# Patient Record
Sex: FEMALE | Race: WHITE | NOT HISPANIC OR LATINO | Employment: OTHER | URBAN - METROPOLITAN AREA
[De-identification: names, ages, dates, MRNs, and addresses within clinical notes are randomized per-mention and may not be internally consistent; named-entity substitution may affect disease eponyms.]

---

## 2023-11-24 ENCOUNTER — LAB REQUISITION (OUTPATIENT)
Dept: LAB | Facility: HOSPITAL | Age: 88
End: 2023-11-24

## 2023-11-24 DIAGNOSIS — I10 ESSENTIAL (PRIMARY) HYPERTENSION: ICD-10-CM

## 2023-11-24 LAB
ALBUMIN SERPL BCP-MCNC: 3.7 G/DL (ref 3.4–5)
ALP SERPL-CCNC: 103 U/L (ref 33–136)
ALT SERPL W P-5'-P-CCNC: 45 U/L (ref 7–45)
ANION GAP SERPL CALC-SCNC: 14 MMOL/L (ref 10–20)
AST SERPL W P-5'-P-CCNC: 44 U/L (ref 9–39)
BASOPHILS # BLD AUTO: 0.04 X10*3/UL (ref 0–0.1)
BASOPHILS NFR BLD AUTO: 0.6 %
BILIRUB SERPL-MCNC: 0.6 MG/DL (ref 0–1.2)
BUN SERPL-MCNC: 27 MG/DL (ref 6–23)
CALCIUM SERPL-MCNC: 8.5 MG/DL (ref 8.6–10.3)
CHLORIDE SERPL-SCNC: 108 MMOL/L (ref 98–107)
CHOLEST SERPL-MCNC: 137 MG/DL (ref 0–199)
CHOLESTEROL/HDL RATIO: 3
CO2 SERPL-SCNC: 25 MMOL/L (ref 21–32)
CREAT SERPL-MCNC: 1.16 MG/DL (ref 0.5–1.05)
EOSINOPHIL # BLD AUTO: 0.07 X10*3/UL (ref 0–0.4)
EOSINOPHIL NFR BLD AUTO: 1.1 %
ERYTHROCYTE [DISTWIDTH] IN BLOOD BY AUTOMATED COUNT: 12.4 % (ref 11.5–14.5)
GFR SERPL CREATININE-BSD FRML MDRD: 44 ML/MIN/1.73M*2
GLUCOSE SERPL-MCNC: 89 MG/DL (ref 74–99)
HCT VFR BLD AUTO: 42.3 % (ref 36–46)
HDLC SERPL-MCNC: 46.3 MG/DL
HGB BLD-MCNC: 13 G/DL (ref 12–16)
IMM GRANULOCYTES # BLD AUTO: 0.02 X10*3/UL (ref 0–0.5)
IMM GRANULOCYTES NFR BLD AUTO: 0.3 % (ref 0–0.9)
LDLC SERPL CALC-MCNC: 71 MG/DL
LYMPHOCYTES # BLD AUTO: 1.26 X10*3/UL (ref 0.8–3)
LYMPHOCYTES NFR BLD AUTO: 20.4 %
MCH RBC QN AUTO: 32.5 PG (ref 26–34)
MCHC RBC AUTO-ENTMCNC: 30.7 G/DL (ref 32–36)
MCV RBC AUTO: 106 FL (ref 80–100)
MONOCYTES # BLD AUTO: 0.64 X10*3/UL (ref 0.05–0.8)
MONOCYTES NFR BLD AUTO: 10.3 %
NEUTROPHILS # BLD AUTO: 4.16 X10*3/UL (ref 1.6–5.5)
NEUTROPHILS NFR BLD AUTO: 67.3 %
NON HDL CHOLESTEROL: 91 MG/DL (ref 0–149)
NRBC BLD-RTO: 0 /100 WBCS (ref 0–0)
PLATELET # BLD AUTO: 236 X10*3/UL (ref 150–450)
POTASSIUM SERPL-SCNC: 4.7 MMOL/L (ref 3.5–5.3)
PROT SERPL-MCNC: 5.8 G/DL (ref 6.4–8.2)
RBC # BLD AUTO: 4 X10*6/UL (ref 4–5.2)
SODIUM SERPL-SCNC: 142 MMOL/L (ref 136–145)
TRIGL SERPL-MCNC: 98 MG/DL (ref 0–149)
TSH SERPL-ACNC: 2.45 MIU/L (ref 0.44–3.98)
VLDL: 20 MG/DL (ref 0–40)
WBC # BLD AUTO: 6.2 X10*3/UL (ref 4.4–11.3)

## 2023-11-24 PROCEDURE — 85025 COMPLETE CBC W/AUTO DIFF WBC: CPT | Mod: OUT | Performed by: HOSPITALIST

## 2023-11-24 PROCEDURE — 80053 COMPREHEN METABOLIC PANEL: CPT | Mod: OUT | Performed by: HOSPITALIST

## 2023-11-24 PROCEDURE — 80061 LIPID PANEL: CPT | Mod: OUT | Performed by: HOSPITALIST

## 2023-11-24 PROCEDURE — 84443 ASSAY THYROID STIM HORMONE: CPT | Mod: OUT | Performed by: HOSPITALIST

## 2024-01-03 PROCEDURE — 87086 URINE CULTURE/COLONY COUNT: CPT | Mod: OUT,GEALAB | Performed by: HOSPITALIST

## 2024-01-03 PROCEDURE — 81001 URINALYSIS AUTO W/SCOPE: CPT | Mod: OUT | Performed by: HOSPITALIST

## 2024-01-04 ENCOUNTER — LAB REQUISITION (OUTPATIENT)
Dept: LAB | Facility: HOSPITAL | Age: 89
End: 2024-01-04
Payer: MEDICARE

## 2024-01-04 DIAGNOSIS — R52 PAIN, UNSPECIFIED: ICD-10-CM

## 2024-01-04 DIAGNOSIS — R31.9 HEMATURIA, UNSPECIFIED: ICD-10-CM

## 2024-01-04 DIAGNOSIS — N39.0 URINARY TRACT INFECTION, SITE NOT SPECIFIED: ICD-10-CM

## 2024-01-04 LAB
APPEARANCE UR: CLEAR
BILIRUB UR STRIP.AUTO-MCNC: NEGATIVE MG/DL
COLOR UR: YELLOW
GLUCOSE UR STRIP.AUTO-MCNC: NEGATIVE MG/DL
HYALINE CASTS #/AREA URNS AUTO: ABNORMAL /LPF
KETONES UR STRIP.AUTO-MCNC: NEGATIVE MG/DL
LEUKOCYTE ESTERASE UR QL STRIP.AUTO: ABNORMAL
NITRITE UR QL STRIP.AUTO: NEGATIVE
PH UR STRIP.AUTO: 5 [PH]
PROT UR STRIP.AUTO-MCNC: NEGATIVE MG/DL
RBC # UR STRIP.AUTO: NEGATIVE /UL
RBC #/AREA URNS AUTO: ABNORMAL /HPF
SP GR UR STRIP.AUTO: 1.01
UROBILINOGEN UR STRIP.AUTO-MCNC: <2 MG/DL
WBC #/AREA URNS AUTO: ABNORMAL /HPF

## 2024-01-05 LAB — BACTERIA UR CULT: NORMAL

## 2024-02-29 ENCOUNTER — LAB REQUISITION (OUTPATIENT)
Dept: LAB | Facility: HOSPITAL | Age: 89
End: 2024-02-29
Payer: MEDICARE

## 2024-02-29 DIAGNOSIS — R41.82 ALTERED MENTAL STATUS, UNSPECIFIED: ICD-10-CM

## 2024-02-29 LAB
ALBUMIN SERPL BCP-MCNC: 3.9 G/DL (ref 3.4–5)
ALP SERPL-CCNC: 108 U/L (ref 33–136)
ALT SERPL W P-5'-P-CCNC: 54 U/L (ref 7–45)
ANION GAP SERPL CALC-SCNC: 17 MMOL/L (ref 10–20)
AST SERPL W P-5'-P-CCNC: 51 U/L (ref 9–39)
BILIRUB SERPL-MCNC: 0.7 MG/DL (ref 0–1.2)
BUN SERPL-MCNC: 27 MG/DL (ref 6–23)
CALCIUM SERPL-MCNC: 9.1 MG/DL (ref 8.6–10.3)
CHLORIDE SERPL-SCNC: 109 MMOL/L (ref 98–107)
CHOLEST SERPL-MCNC: 149 MG/DL (ref 0–199)
CHOLESTEROL/HDL RATIO: 2.9
CO2 SERPL-SCNC: 21 MMOL/L (ref 21–32)
CREAT SERPL-MCNC: 1.29 MG/DL (ref 0.5–1.05)
EGFRCR SERPLBLD CKD-EPI 2021: 38 ML/MIN/1.73M*2
ERYTHROCYTE [DISTWIDTH] IN BLOOD BY AUTOMATED COUNT: 13.3 % (ref 11.5–14.5)
GLUCOSE SERPL-MCNC: 109 MG/DL (ref 74–99)
HCT VFR BLD AUTO: 42.7 % (ref 36–46)
HDLC SERPL-MCNC: 52 MG/DL
HGB BLD-MCNC: 13 G/DL (ref 12–16)
LDLC SERPL CALC-MCNC: 77 MG/DL
MCH RBC QN AUTO: 32.9 PG (ref 26–34)
MCHC RBC AUTO-ENTMCNC: 30.4 G/DL (ref 32–36)
MCV RBC AUTO: 108 FL (ref 80–100)
NON HDL CHOLESTEROL: 97 MG/DL (ref 0–149)
NRBC BLD-RTO: 0 /100 WBCS (ref 0–0)
PLATELET # BLD AUTO: 247 X10*3/UL (ref 150–450)
POTASSIUM SERPL-SCNC: 4.7 MMOL/L (ref 3.5–5.3)
PROT SERPL-MCNC: 6.7 G/DL (ref 6.4–8.2)
RBC # BLD AUTO: 3.95 X10*6/UL (ref 4–5.2)
SODIUM SERPL-SCNC: 142 MMOL/L (ref 136–145)
TRIGL SERPL-MCNC: 102 MG/DL (ref 0–149)
VLDL: 20 MG/DL (ref 0–40)
WBC # BLD AUTO: 6.3 X10*3/UL (ref 4.4–11.3)

## 2024-02-29 PROCEDURE — 80061 LIPID PANEL: CPT | Mod: OUT | Performed by: NURSE PRACTITIONER

## 2024-02-29 PROCEDURE — 85027 COMPLETE CBC AUTOMATED: CPT | Mod: OUT | Performed by: NURSE PRACTITIONER

## 2024-02-29 PROCEDURE — 80053 COMPREHEN METABOLIC PANEL: CPT | Mod: OUT | Performed by: NURSE PRACTITIONER

## 2024-03-03 PROCEDURE — 81001 URINALYSIS AUTO W/SCOPE: CPT | Mod: OUT | Performed by: NURSE PRACTITIONER

## 2024-03-04 ENCOUNTER — LAB REQUISITION (OUTPATIENT)
Dept: LAB | Facility: HOSPITAL | Age: 89
End: 2024-03-04
Payer: MEDICARE

## 2024-03-04 DIAGNOSIS — R41.82 ALTERED MENTAL STATUS, UNSPECIFIED: ICD-10-CM

## 2024-03-04 LAB
APPEARANCE UR: CLEAR
BILIRUB UR STRIP.AUTO-MCNC: NEGATIVE MG/DL
COLOR UR: YELLOW
GLUCOSE UR STRIP.AUTO-MCNC: NEGATIVE MG/DL
HYALINE CASTS #/AREA URNS AUTO: ABNORMAL /LPF
KETONES UR STRIP.AUTO-MCNC: NEGATIVE MG/DL
LEUKOCYTE ESTERASE UR QL STRIP.AUTO: ABNORMAL
MUCOUS THREADS #/AREA URNS AUTO: ABNORMAL /LPF
NITRITE UR QL STRIP.AUTO: NEGATIVE
PH UR STRIP.AUTO: 5 [PH]
PROT UR STRIP.AUTO-MCNC: NEGATIVE MG/DL
RBC # UR STRIP.AUTO: NEGATIVE /UL
RBC #/AREA URNS AUTO: ABNORMAL /HPF
SP GR UR STRIP.AUTO: 1.01
UROBILINOGEN UR STRIP.AUTO-MCNC: <2 MG/DL
WBC #/AREA URNS AUTO: ABNORMAL /HPF

## 2024-03-06 LAB — BACTERIA UR CULT: NORMAL

## 2024-03-28 ENCOUNTER — LAB REQUISITION (OUTPATIENT)
Dept: LAB | Facility: HOSPITAL | Age: 89
End: 2024-03-28
Payer: MEDICARE

## 2024-03-28 DIAGNOSIS — I10 ESSENTIAL (PRIMARY) HYPERTENSION: ICD-10-CM

## 2024-03-28 DIAGNOSIS — F41.1 GENERALIZED ANXIETY DISORDER: ICD-10-CM

## 2024-03-28 LAB
ERYTHROCYTE [DISTWIDTH] IN BLOOD BY AUTOMATED COUNT: 12.1 % (ref 11.5–14.5)
HCT VFR BLD AUTO: 41.8 % (ref 36–46)
HGB BLD-MCNC: 12.9 G/DL (ref 12–16)
MCH RBC QN AUTO: 33 PG (ref 26–34)
MCHC RBC AUTO-ENTMCNC: 30.9 G/DL (ref 32–36)
MCV RBC AUTO: 107 FL (ref 80–100)
NRBC BLD-RTO: 0 /100 WBCS (ref 0–0)
PLATELET # BLD AUTO: 248 X10*3/UL (ref 150–450)
RBC # BLD AUTO: 3.91 X10*6/UL (ref 4–5.2)
TSH SERPL-ACNC: 1.83 MIU/L (ref 0.44–3.98)
WBC # BLD AUTO: 6 X10*3/UL (ref 4.4–11.3)

## 2024-03-28 PROCEDURE — 85027 COMPLETE CBC AUTOMATED: CPT | Mod: OUT

## 2024-03-28 PROCEDURE — 84443 ASSAY THYROID STIM HORMONE: CPT | Mod: OUT

## 2024-04-01 ENCOUNTER — LAB REQUISITION (OUTPATIENT)
Dept: LAB | Facility: HOSPITAL | Age: 89
End: 2024-04-01
Payer: MEDICARE

## 2024-04-01 DIAGNOSIS — I10 ESSENTIAL (PRIMARY) HYPERTENSION: ICD-10-CM

## 2024-04-01 LAB
ALBUMIN SERPL BCP-MCNC: 3.7 G/DL (ref 3.4–5)
ALP SERPL-CCNC: 85 U/L (ref 33–136)
ALT SERPL W P-5'-P-CCNC: 59 U/L (ref 7–45)
ANION GAP SERPL CALC-SCNC: 13 MMOL/L (ref 10–20)
AST SERPL W P-5'-P-CCNC: 60 U/L (ref 9–39)
BILIRUB SERPL-MCNC: 0.7 MG/DL (ref 0–1.2)
BUN SERPL-MCNC: 42 MG/DL (ref 6–23)
CALCIUM SERPL-MCNC: 8.8 MG/DL (ref 8.6–10.3)
CHLORIDE SERPL-SCNC: 110 MMOL/L (ref 98–107)
CHOLEST SERPL-MCNC: 141 MG/DL (ref 0–199)
CHOLESTEROL/HDL RATIO: 3.7
CO2 SERPL-SCNC: 26 MMOL/L (ref 21–32)
CREAT SERPL-MCNC: 1.75 MG/DL (ref 0.5–1.05)
EGFRCR SERPLBLD CKD-EPI 2021: 27 ML/MIN/1.73M*2
GLUCOSE SERPL-MCNC: 93 MG/DL (ref 74–99)
HDLC SERPL-MCNC: 37.8 MG/DL
LDLC SERPL CALC-MCNC: 78 MG/DL
NON HDL CHOLESTEROL: 103 MG/DL (ref 0–149)
POTASSIUM SERPL-SCNC: 5.3 MMOL/L (ref 3.5–5.3)
PROT SERPL-MCNC: 6.1 G/DL (ref 6.4–8.2)
SODIUM SERPL-SCNC: 144 MMOL/L (ref 136–145)
TRIGL SERPL-MCNC: 125 MG/DL (ref 0–149)
VLDL: 25 MG/DL (ref 0–40)

## 2024-04-01 PROCEDURE — 80061 LIPID PANEL: CPT | Mod: OUT | Performed by: NURSE PRACTITIONER

## 2024-04-01 PROCEDURE — 80053 COMPREHEN METABOLIC PANEL: CPT | Mod: OUT | Performed by: NURSE PRACTITIONER

## 2024-04-18 ENCOUNTER — APPOINTMENT (OUTPATIENT)
Dept: RADIOLOGY | Facility: HOSPITAL | Age: 89
End: 2024-04-18
Payer: MEDICARE

## 2024-04-18 ENCOUNTER — APPOINTMENT (OUTPATIENT)
Dept: CARDIOLOGY | Facility: HOSPITAL | Age: 89
End: 2024-04-18
Payer: MEDICARE

## 2024-04-18 ENCOUNTER — HOSPITAL ENCOUNTER (OUTPATIENT)
Facility: HOSPITAL | Age: 89
Setting detail: OBSERVATION
Discharge: HOME | End: 2024-04-20
Attending: EMERGENCY MEDICINE | Admitting: INTERNAL MEDICINE
Payer: MEDICARE

## 2024-04-18 DIAGNOSIS — I67.9 INTRACRANIAL VASCULAR STENOSIS: ICD-10-CM

## 2024-04-18 DIAGNOSIS — G45.8 OTHER TRANSIENT CEREBRAL ISCHEMIC ATTACKS AND RELATED SYNDROMES: ICD-10-CM

## 2024-04-18 DIAGNOSIS — G45.9 TIA (TRANSIENT ISCHEMIC ATTACK): Primary | ICD-10-CM

## 2024-04-18 DIAGNOSIS — D64.9 ANEMIA, UNSPECIFIED TYPE: ICD-10-CM

## 2024-04-18 LAB
ALBUMIN SERPL BCP-MCNC: 4.1 G/DL (ref 3.4–5)
ALP SERPL-CCNC: 97 U/L (ref 33–136)
ALT SERPL W P-5'-P-CCNC: 85 U/L (ref 7–45)
ANION GAP BLDV CALCULATED.4IONS-SCNC: 18 MMOL/L (ref 10–25)
ANION GAP SERPL CALC-SCNC: 20 MMOL/L (ref 10–20)
APTT PPP: 31 SECONDS (ref 27–38)
AST SERPL W P-5'-P-CCNC: 74 U/L (ref 9–39)
BASE EXCESS BLDV CALC-SCNC: -5.1 MMOL/L (ref -2–3)
BASOPHILS # BLD AUTO: 0.03 X10*3/UL (ref 0–0.1)
BASOPHILS NFR BLD AUTO: 0.3 %
BILIRUB DIRECT SERPL-MCNC: 0.2 MG/DL (ref 0–0.3)
BILIRUB SERPL-MCNC: 0.8 MG/DL (ref 0–1.2)
BNP SERPL-MCNC: 150 PG/ML (ref 0–99)
BODY TEMPERATURE: 37 DEGREES CELSIUS
BUN SERPL-MCNC: 46 MG/DL (ref 6–23)
CA-I BLDV-SCNC: 1.21 MMOL/L (ref 1.1–1.33)
CALCIUM SERPL-MCNC: 9.8 MG/DL (ref 8.6–10.3)
CARDIAC TROPONIN I PNL SERPL HS: 23 NG/L (ref 0–13)
CARDIAC TROPONIN I PNL SERPL HS: 27 NG/L (ref 0–13)
CHLORIDE BLDV-SCNC: 110 MMOL/L (ref 98–107)
CHLORIDE SERPL-SCNC: 110 MMOL/L (ref 98–107)
CHOLEST SERPL-MCNC: 142 MG/DL (ref 0–199)
CHOLESTEROL/HDL RATIO: 2.9
CO2 SERPL-SCNC: 19 MMOL/L (ref 21–32)
CREAT SERPL-MCNC: 2.15 MG/DL (ref 0.5–1.05)
EGFRCR SERPLBLD CKD-EPI 2021: 21 ML/MIN/1.73M*2
EOSINOPHIL # BLD AUTO: 0 X10*3/UL (ref 0–0.4)
EOSINOPHIL NFR BLD AUTO: 0 %
ERYTHROCYTE [DISTWIDTH] IN BLOOD BY AUTOMATED COUNT: 12.6 % (ref 11.5–14.5)
GLUCOSE BLD MANUAL STRIP-MCNC: 102 MG/DL (ref 74–99)
GLUCOSE BLDV-MCNC: 99 MG/DL (ref 74–99)
GLUCOSE SERPL-MCNC: 100 MG/DL (ref 74–99)
HCO3 BLDV-SCNC: 19.9 MMOL/L (ref 22–26)
HCT VFR BLD AUTO: 41.2 % (ref 36–46)
HCT VFR BLD EST: 41 % (ref 36–46)
HDLC SERPL-MCNC: 49.5 MG/DL
HGB BLD-MCNC: 13.1 G/DL (ref 12–16)
HGB BLDV-MCNC: 13.6 G/DL (ref 12–16)
IMM GRANULOCYTES # BLD AUTO: 0.04 X10*3/UL (ref 0–0.5)
IMM GRANULOCYTES NFR BLD AUTO: 0.4 % (ref 0–0.9)
INHALED O2 CONCENTRATION: 21 %
INR PPP: 1.5 (ref 0.9–1.1)
LACTATE BLDV-SCNC: 3.8 MMOL/L (ref 0.4–2)
LDLC SERPL CALC-MCNC: 63 MG/DL
LYMPHOCYTES # BLD AUTO: 2.45 X10*3/UL (ref 0.8–3)
LYMPHOCYTES NFR BLD AUTO: 26.2 %
MCH RBC QN AUTO: 33 PG (ref 26–34)
MCHC RBC AUTO-ENTMCNC: 31.8 G/DL (ref 32–36)
MCV RBC AUTO: 104 FL (ref 80–100)
MONOCYTES # BLD AUTO: 0.7 X10*3/UL (ref 0.05–0.8)
MONOCYTES NFR BLD AUTO: 7.5 %
NEUTROPHILS # BLD AUTO: 6.14 X10*3/UL (ref 1.6–5.5)
NEUTROPHILS NFR BLD AUTO: 65.6 %
NON HDL CHOLESTEROL: 93 MG/DL (ref 0–149)
NRBC BLD-RTO: 0 /100 WBCS (ref 0–0)
OXYHGB MFR BLDV: 50.1 % (ref 45–75)
PCO2 BLDV: 36 MM HG (ref 41–51)
PH BLDV: 7.35 PH (ref 7.33–7.43)
PLATELET # BLD AUTO: 253 X10*3/UL (ref 150–450)
PO2 BLDV: 37 MM HG (ref 35–45)
POTASSIUM BLDV-SCNC: 5.8 MMOL/L (ref 3.5–5.3)
POTASSIUM SERPL-SCNC: 5.4 MMOL/L (ref 3.5–5.3)
PROT SERPL-MCNC: 7 G/DL (ref 6.4–8.2)
PROTHROMBIN TIME: 16.4 SECONDS (ref 9.8–12.8)
RBC # BLD AUTO: 3.97 X10*6/UL (ref 4–5.2)
SAO2 % BLDV: 51 % (ref 45–75)
SODIUM BLDV-SCNC: 142 MMOL/L (ref 136–145)
SODIUM SERPL-SCNC: 144 MMOL/L (ref 136–145)
TRIGL SERPL-MCNC: 149 MG/DL (ref 0–149)
VLDL: 30 MG/DL (ref 0–40)
WBC # BLD AUTO: 9.4 X10*3/UL (ref 4.4–11.3)

## 2024-04-18 PROCEDURE — 83605 ASSAY OF LACTIC ACID: CPT | Performed by: NURSE PRACTITIONER

## 2024-04-18 PROCEDURE — 84132 ASSAY OF SERUM POTASSIUM: CPT | Mod: 59,91 | Performed by: EMERGENCY MEDICINE

## 2024-04-18 PROCEDURE — 85610 PROTHROMBIN TIME: CPT | Performed by: EMERGENCY MEDICINE

## 2024-04-18 PROCEDURE — 99285 EMERGENCY DEPT VISIT HI MDM: CPT | Mod: 25

## 2024-04-18 PROCEDURE — 36415 COLL VENOUS BLD VENIPUNCTURE: CPT | Performed by: EMERGENCY MEDICINE

## 2024-04-18 PROCEDURE — G0378 HOSPITAL OBSERVATION PER HR: HCPCS

## 2024-04-18 PROCEDURE — 93005 ELECTROCARDIOGRAM TRACING: CPT

## 2024-04-18 PROCEDURE — 84132 ASSAY OF SERUM POTASSIUM: CPT | Mod: 91 | Performed by: NURSE PRACTITIONER

## 2024-04-18 PROCEDURE — 83880 ASSAY OF NATRIURETIC PEPTIDE: CPT | Performed by: NURSE PRACTITIONER

## 2024-04-18 PROCEDURE — 84132 ASSAY OF SERUM POTASSIUM: CPT | Performed by: EMERGENCY MEDICINE

## 2024-04-18 PROCEDURE — 82947 ASSAY GLUCOSE BLOOD QUANT: CPT

## 2024-04-18 PROCEDURE — 96374 THER/PROPH/DIAG INJ IV PUSH: CPT | Performed by: INTERNAL MEDICINE

## 2024-04-18 PROCEDURE — 70450 CT HEAD/BRAIN W/O DYE: CPT

## 2024-04-18 PROCEDURE — 84484 ASSAY OF TROPONIN QUANT: CPT | Performed by: EMERGENCY MEDICINE

## 2024-04-18 PROCEDURE — 2500000004 HC RX 250 GENERAL PHARMACY W/ HCPCS (ALT 636 FOR OP/ED): Performed by: NURSE PRACTITIONER

## 2024-04-18 PROCEDURE — 85025 COMPLETE CBC W/AUTO DIFF WBC: CPT | Performed by: EMERGENCY MEDICINE

## 2024-04-18 PROCEDURE — 70450 CT HEAD/BRAIN W/O DYE: CPT | Performed by: RADIOLOGY

## 2024-04-18 PROCEDURE — 71045 X-RAY EXAM CHEST 1 VIEW: CPT

## 2024-04-18 PROCEDURE — 99291 CRITICAL CARE FIRST HOUR: CPT | Performed by: EMERGENCY MEDICINE

## 2024-04-18 PROCEDURE — 82248 BILIRUBIN DIRECT: CPT | Performed by: EMERGENCY MEDICINE

## 2024-04-18 PROCEDURE — 83718 ASSAY OF LIPOPROTEIN: CPT | Performed by: NURSE PRACTITIONER

## 2024-04-18 PROCEDURE — 84484 ASSAY OF TROPONIN QUANT: CPT | Mod: 91 | Performed by: NURSE PRACTITIONER

## 2024-04-18 PROCEDURE — 83036 HEMOGLOBIN GLYCOSYLATED A1C: CPT | Mod: AHULAB | Performed by: NURSE PRACTITIONER

## 2024-04-18 PROCEDURE — 71045 X-RAY EXAM CHEST 1 VIEW: CPT | Performed by: RADIOLOGY

## 2024-04-18 PROCEDURE — 85730 THROMBOPLASTIN TIME PARTIAL: CPT | Performed by: EMERGENCY MEDICINE

## 2024-04-18 RX ORDER — ENOXAPARIN SODIUM 100 MG/ML
40 INJECTION SUBCUTANEOUS EVERY 24 HOURS
Status: DISCONTINUED | OUTPATIENT
Start: 2024-04-18 | End: 2024-04-18

## 2024-04-18 RX ORDER — TALC
3 POWDER (GRAM) TOPICAL NIGHTLY
Status: DISCONTINUED | OUTPATIENT
Start: 2024-04-18 | End: 2024-04-20 | Stop reason: HOSPADM

## 2024-04-18 RX ORDER — HYDRALAZINE HYDROCHLORIDE 20 MG/ML
10 INJECTION INTRAMUSCULAR; INTRAVENOUS
Status: DISCONTINUED | OUTPATIENT
Start: 2024-04-18 | End: 2024-04-20 | Stop reason: HOSPADM

## 2024-04-18 RX ORDER — POLYETHYLENE GLYCOL 3350 17 G/17G
17 POWDER, FOR SOLUTION ORAL DAILY
Status: DISCONTINUED | OUTPATIENT
Start: 2024-04-19 | End: 2024-04-20 | Stop reason: HOSPADM

## 2024-04-18 RX ORDER — SODIUM CHLORIDE 9 MG/ML
100 INJECTION, SOLUTION INTRAVENOUS CONTINUOUS
Status: DISCONTINUED | OUTPATIENT
Start: 2024-04-18 | End: 2024-04-20 | Stop reason: HOSPADM

## 2024-04-18 RX ORDER — PANTOPRAZOLE SODIUM 40 MG/10ML
40 INJECTION, POWDER, LYOPHILIZED, FOR SOLUTION INTRAVENOUS
Status: DISCONTINUED | OUTPATIENT
Start: 2024-04-19 | End: 2024-04-20 | Stop reason: HOSPADM

## 2024-04-18 RX ORDER — ACETAMINOPHEN 650 MG/1
650 SUPPOSITORY RECTAL EVERY 4 HOURS PRN
Status: DISCONTINUED | OUTPATIENT
Start: 2024-04-18 | End: 2024-04-20 | Stop reason: HOSPADM

## 2024-04-18 RX ORDER — ASPIRIN 81 MG/1
81 TABLET ORAL DAILY
Status: DISCONTINUED | OUTPATIENT
Start: 2024-04-19 | End: 2024-04-20 | Stop reason: HOSPADM

## 2024-04-18 RX ORDER — PANTOPRAZOLE SODIUM 40 MG/1
40 TABLET, DELAYED RELEASE ORAL
Status: DISCONTINUED | OUTPATIENT
Start: 2024-04-19 | End: 2024-04-20 | Stop reason: HOSPADM

## 2024-04-18 RX ORDER — HYDRALAZINE HYDROCHLORIDE 25 MG/1
25 TABLET, FILM COATED ORAL EVERY 6 HOURS PRN
Status: DISCONTINUED | OUTPATIENT
Start: 2024-04-20 | End: 2024-04-20 | Stop reason: HOSPADM

## 2024-04-18 RX ORDER — ONDANSETRON HYDROCHLORIDE 2 MG/ML
4 INJECTION, SOLUTION INTRAVENOUS EVERY 8 HOURS PRN
Status: DISCONTINUED | OUTPATIENT
Start: 2024-04-18 | End: 2024-04-20 | Stop reason: HOSPADM

## 2024-04-18 RX ORDER — ACETAMINOPHEN 325 MG/1
650 TABLET ORAL EVERY 4 HOURS PRN
Status: DISCONTINUED | OUTPATIENT
Start: 2024-04-18 | End: 2024-04-20 | Stop reason: HOSPADM

## 2024-04-18 RX ORDER — ONDANSETRON 4 MG/1
4 TABLET, FILM COATED ORAL EVERY 8 HOURS PRN
Status: DISCONTINUED | OUTPATIENT
Start: 2024-04-18 | End: 2024-04-20 | Stop reason: HOSPADM

## 2024-04-18 RX ORDER — ATORVASTATIN CALCIUM 40 MG/1
40 TABLET, FILM COATED ORAL NIGHTLY
Status: DISCONTINUED | OUTPATIENT
Start: 2024-04-18 | End: 2024-04-20 | Stop reason: HOSPADM

## 2024-04-18 RX ORDER — HEPARIN SODIUM 5000 [USP'U]/ML
5000 INJECTION, SOLUTION INTRAVENOUS; SUBCUTANEOUS EVERY 8 HOURS
Status: DISCONTINUED | OUTPATIENT
Start: 2024-04-18 | End: 2024-04-20 | Stop reason: HOSPADM

## 2024-04-18 RX ORDER — ACETAMINOPHEN 160 MG/5ML
650 SOLUTION ORAL EVERY 4 HOURS PRN
Status: DISCONTINUED | OUTPATIENT
Start: 2024-04-18 | End: 2024-04-20 | Stop reason: HOSPADM

## 2024-04-18 RX ORDER — LABETALOL HYDROCHLORIDE 5 MG/ML
10 INJECTION, SOLUTION INTRAVENOUS EVERY 10 MIN PRN
Status: DISCONTINUED | OUTPATIENT
Start: 2024-04-18 | End: 2024-04-18

## 2024-04-18 RX ADMIN — HYDRALAZINE HYDROCHLORIDE 10 MG: 20 INJECTION INTRAMUSCULAR; INTRAVENOUS at 19:49

## 2024-04-18 RX ADMIN — SODIUM CHLORIDE 100 ML/HR: 9 INJECTION, SOLUTION INTRAVENOUS at 23:45

## 2024-04-18 NOTE — H&P
"History Of Present Illness  Juanis Cornejo is a 89 y.o. female with past medical history significant for hypertension, breast cancer, CKD, anxiety/depression, OA, Alzheimer's dementia with behavioral disturbances. [place] [facility] [place] with [place] like symptoms.    Staff at her facility noted right sided weakness and called EMS. When they arrived they did not appreciate any deficit. On presentation to ED appeared at baseline. Yelling out \"help me\"       On exam this AM she does answer minimal questions but overal does not follow commands. Her face is flushed.    ED COURSE: VSS on arrival. Labs notable for potassium of 5.4, bicarb of 19, Cr. /BUN 2.15/46, ALT/AST 85/74, HS troponin 23. VBG with lactate of 3.8. EKG SR, no ischemic changes. CXR with prominence of interstitial markings. CTH negative for acute findings.      Past Medical History  She has a past medical history of Encounter for screening mammogram for malignant neoplasm of breast, Hemochromatosis, unspecified, Pain in unspecified hand, Personal history of other diseases of the circulatory system, Personal history of other diseases of the nervous system and sense organs, and Personal history of other infectious and parasitic diseases.    Surgical History  She has a past surgical history that includes Cataract extraction (06/14/2013); Colonoscopy (06/14/2013); Other surgical history (06/14/2013); Carpal tunnel release (06/14/2013); Other surgical history (06/14/2013); MR angio head wo IV contrast (9/9/2013); and MR angio neck wo IV contrast (9/9/2013).     Social History  She has no history on file for tobacco use, alcohol use, and drug use.    Family History  No family history on file.     Allergies  Patient has no allergy information on record.    Review of Systems   Unable to perform ROS: Dementia        Physical Exam  Constitutional:       Comments: frail   HENT:      Head:      Comments: Face flushed  Cardiovascular:      Rate and Rhythm: Normal rate " and regular rhythm.      Pulses: Normal pulses.      Heart sounds: Normal heart sounds. No murmur heard.     No gallop.   Pulmonary:      Effort: Pulmonary effort is normal. No respiratory distress.      Breath sounds: Normal breath sounds. No wheezing or rhonchi.      Comments: Diminished bilaterally  Abdominal:      General: Abdomen is flat. Bowel sounds are normal. There is no distension.      Palpations: Abdomen is soft.      Tenderness: There is no abdominal tenderness. There is no guarding.   Skin:     General: Skin is warm.      Capillary Refill: Capillary refill takes less than 2 seconds.   Neurological:      Mental Status: She is alert. She is disoriented and confused.   Psychiatric:         Cognition and Memory: Cognition is impaired. Memory is impaired.          Last Recorded Vitals  BP (!) 126/96 (BP Location: Right arm, Patient Position: Lying)   Pulse 86   Temp 36.3 °C (97.4 °F)   Resp 18   SpO2 100%     Relevant Results  Results for orders placed or performed during the hospital encounter of 04/18/24 (from the past 24 hour(s))   POCT GLUCOSE   Result Value Ref Range    POCT Glucose 102 (H) 74 - 99 mg/dL   CBC and Auto Differential   Result Value Ref Range    WBC 9.4 4.4 - 11.3 x10*3/uL    nRBC 0.0 0.0 - 0.0 /100 WBCs    RBC 3.97 (L) 4.00 - 5.20 x10*6/uL    Hemoglobin 13.1 12.0 - 16.0 g/dL    Hematocrit 41.2 36.0 - 46.0 %     (H) 80 - 100 fL    MCH 33.0 26.0 - 34.0 pg    MCHC 31.8 (L) 32.0 - 36.0 g/dL    RDW 12.6 11.5 - 14.5 %    Platelets 253 150 - 450 x10*3/uL    Neutrophils % 65.6 40.0 - 80.0 %    Immature Granulocytes %, Automated 0.4 0.0 - 0.9 %    Lymphocytes % 26.2 13.0 - 44.0 %    Monocytes % 7.5 2.0 - 10.0 %    Eosinophils % 0.0 0.0 - 6.0 %    Basophils % 0.3 0.0 - 2.0 %    Neutrophils Absolute 6.14 (H) 1.60 - 5.50 x10*3/uL    Immature Granulocytes Absolute, Automated 0.04 0.00 - 0.50 x10*3/uL    Lymphocytes Absolute 2.45 0.80 - 3.00 x10*3/uL    Monocytes Absolute 0.70 0.05 - 0.80  x10*3/uL    Eosinophils Absolute 0.00 0.00 - 0.40 x10*3/uL    Basophils Absolute 0.03 0.00 - 0.10 x10*3/uL   Troponin I, High Sensitivity   Result Value Ref Range    Troponin I, High Sensitivity 23 (H) 0 - 13 ng/L   Protime-INR   Result Value Ref Range    Protime 16.4 (H) 9.8 - 12.8 seconds    INR 1.5 (H) 0.9 - 1.1   APTT   Result Value Ref Range    aPTT 31 27 - 38 seconds   Basic metabolic panel   Result Value Ref Range    Glucose 100 (H) 74 - 99 mg/dL    Sodium 144 136 - 145 mmol/L    Potassium 5.4 (H) 3.5 - 5.3 mmol/L    Chloride 110 (H) 98 - 107 mmol/L    Bicarbonate 19 (L) 21 - 32 mmol/L    Anion Gap 20 10 - 20 mmol/L    Urea Nitrogen 46 (H) 6 - 23 mg/dL    Creatinine 2.15 (H) 0.50 - 1.05 mg/dL    eGFR 21 (L) >60 mL/min/1.73m*2    Calcium 9.8 8.6 - 10.3 mg/dL   Hepatic function panel   Result Value Ref Range    Albumin 4.1 3.4 - 5.0 g/dL    Bilirubin, Total 0.8 0.0 - 1.2 mg/dL    Bilirubin, Direct 0.2 0.0 - 0.3 mg/dL    Alkaline Phosphatase 97 33 - 136 U/L    ALT 85 (H) 7 - 45 U/L    AST 74 (H) 9 - 39 U/L    Total Protein 7.0 6.4 - 8.2 g/dL   Blood Gas Venous Full Panel   Result Value Ref Range    POCT pH, Venous 7.35 7.33 - 7.43 pH    POCT pCO2, Venous 36 (L) 41 - 51 mm Hg    POCT pO2, Venous 37 35 - 45 mm Hg    POCT SO2, Venous 51 45 - 75 %    POCT Oxy Hemoglobin, Venous 50.1 45.0 - 75.0 %    POCT Hematocrit Calculated, Venous 41.0 36.0 - 46.0 %    POCT Sodium, Venous 142 136 - 145 mmol/L    POCT Potassium, Venous 5.8 (H) 3.5 - 5.3 mmol/L    POCT Chloride, Venous 110 (H) 98 - 107 mmol/L    POCT Ionized Calicum, Venous 1.21 1.10 - 1.33 mmol/L    POCT Glucose, Venous 99 74 - 99 mg/dL    POCT Lactate, Venous 3.8 (H) 0.4 - 2.0 mmol/L    POCT Base Excess, Venous -5.1 (L) -2.0 - 3.0 mmol/L    POCT HCO3 Calculated, Venous 19.9 (L) 22.0 - 26.0 mmol/L    POCT Hemoglobin, Venous 13.6 12.0 - 16.0 g/dL    POCT Anion Gap, Venous 18.0 10.0 - 25.0 mmol/L    Patient Temperature 37.0 degrees Celsius    FiO2 21 %  "    Assessment/Plan   Juanis Cornejo is a 89 y.o. female with past medical history significant for hypertension, breast cancer, CKD, anxiety/depression, OA, Alzheimer's dementia with behavioral disturbances. Presented to [facility] ED today with stroke like symptoms.    Staff at her facility noted right sided weakness and called EMS. When they arrived they did not appreciate any deficit. On presentation to ED appeared at baseline. Yelling out \"help me\"     ED COURSE: VSS on arrival. Labs notable for potassium of 5.4, bicarb of 19, Cr. /BUN 2.15/46, ALT/AST 85/74, HS troponin 23. VBG with lactate of 3.8. EKG SR, no ischemic changes. CXR with prominence of interstitial markings. CTH negative for acute findings.     Transient right sided weakness, r/o TIA/CVA  -MRI/MRA, echo w/bubble study  -A1c, lipid panel  -asa/statin  -q4h neuro checks, monitor on tele  -neuro consult, appreciate recs  -UA ordered-> did notify bedside nurse if needed we can straight cath for urine    MARYANN on CKD  Hyperkalemia  Elevated lactate  -IVF  -recheck K level to ensure accurate  -avoid nephrotoxic agents   -lactate 3.8 on VBG-> repeat 1.1   -no leukocytosis, afebrile, no tachycardia. -However soft BP's on arrival->IVF   -urine is dark during exam     Mild transaminitis  -monitor, if uptrend consider holding statin     Elevated troponin  EKG no ischemic changes  -trend trop, monitor on tele: flat    HTN  -BP on softer side, hold BP meds for now and trend     Anxiety/depression  Alzheimer's dementia with behavioral disturbances  -Resume home meds      DVT prophylaxis  -heparin subcutaneous/scds    Discharge planning  -from AL facility, eval PT needs     Labs/Testing reviewed    Interdisciplinary team rounding completed with hospitalist, nurse, TCC    NP discussed plan and lab/testing results with Dr. Hernandez. DC pending MRI     50 minutes total spent on patient's care today; >50% time spent on counseling/coordination of care        "

## 2024-04-18 NOTE — ED PROVIDER NOTES
HPI   No chief complaint on file.      HPI: []  89-year-old white female history of hypertension, breast cancer, dementia today comes in with concern for stroke.  Last well-known 11:30 AM nursing staff noticed right-sided weakness EMS was called they found no deficits.  Patient has no complaints.  She denies any chest pain pressure heaviness fever chills nausea vomit diarrhea cough congestion incontinence seizures syncope or near syncope.  Patient unable to provide history.  She has advanced dementia.  She is at her baseline mental status.    Past history: Hypertension, breast cancer, dementia  Social: Patient denies a current tobacco alcohol drug abuse.  REVIEW OF SYSTEMS:    GENERAL.: No weight loss, fatigue, anorexia, insomnia, fever.    EYES: No vision loss, double vision, drainage, eye pain.    ENT: No pharyngitis, dry mouth.    CARDIOPULMONARY: No chest pain, palpitations, syncope, near syncope. No shortness of breath, cough, hemoptysis.    GI: No abdominal pain, change in bowel habits, melena, hematemesis, hematochezia, nausea, vomiting, diarrhea.    : No discharge, dysuria, frequency, urgency, hematuria.    MS: No limb pain, joint pain, joint swelling.  Neuro: Positive right-sided weakness  SKIN: No rashes.    PSYCH: No depression, anxiety, suicidality, homicidality.    Review of systems is otherwise negative unless stated above or in history of present illness.  Social history, family history, allergies reviewed.  PHYSICAL EXAM:    GENERAL: Vitals noted, no distress. Alert and oriented  x 0 to baseline he keeps saying help me help me. Non-toxic.      EENT: TMs clear. Posterior oropharynx unremarkable. No meningismus. No LAD.     NECK: Supple. Nontender. No midline tenderness.     CARDIAC: Regular, rate, rhythm. No murmurs rubs or gallops. No JVD    PULMONARY: Lungs clear bilaterally with good aeration. No wheezes rales or rhonchi. No respiratory distress.     ABDOMEN: Soft, nonsurgical. Nontender. No  peritoneal signs. Normoactive bowel sounds. No pulsatile masses.     EXTREMITIES: No peripheral edema. Negative Homans bilaterally, no cords.  2+ bounding pulses well-perfused    SKIN: No rash. Intact.     NEURO: No focal neurologic deficits, NIH score of 0. Cranial nerves normal as tested from II through XII.     MEDICAL DECISION MAKING:  EKG on interpretation shows a normal sinus rhythm left axis deviation rate mid 80s with no acute ischemic change.    CBC with differential shows no leukocytosis stable hemoglobin Suni metabolic panel shows stable CKD LFTs unremarkable troponin 23 delta troponin pending CT head negative chest x-ray negative.  UA pending.    Treatment in ED: Patient was seen by myself in triage upon arrival at 1 PM.  Van negative.  Will call stroke alert.  CT head negative.  Stroke scale 2 due to inability to answer questions due to advanced dementia..  Hence not a TNK candidate.  Given IV fluids placed on a cardiac monitor.    ED course: Patient remained stable hemodynamic.    Impression: #1 transient right-sided weakness, #2 generalized weakness    Plans and MDM: Elderly female 89-year-old presents with 1 episode of right-sided weakness currently back to baseline seen by myself after 1 PM on my assessment her stroke scale 2 because she was unable to answer my question  due to advanced dementia, low concern for acute CVA, discussed possible she had a TIA, unfortunately she has advanced dementia so history is unreliable, currently no signs of infection or sepsis or dehydration.  UA pending.  Anticipate patient will be hospital for further care.  Signout to oncoming colleague.                            No data recorded       NIH Stroke Scale: 0             Patient History   Past Medical History:   Diagnosis Date   • Encounter for screening mammogram for malignant neoplasm of breast     Visit for screening mammogram   • Hemochromatosis, unspecified     Hemochromatosis   • Pain in unspecified hand      Pain in hand   • Personal history of other diseases of the circulatory system     History of hypertension   • Personal history of other diseases of the nervous system and sense organs     History of cataract   • Personal history of other infectious and parasitic diseases     History of herpes zoster     Past Surgical History:   Procedure Laterality Date   • CARPAL TUNNEL RELEASE  06/14/2013    Neuroplasty Decompression Median Nerve At Carpal Tunnel   • CATARACT EXTRACTION  06/14/2013    Cataract Surgery   • COLONOSCOPY  06/14/2013    Complete Colonoscopy   • MR HEAD ANGIO WO IV CONTRAST  9/9/2013    MR HEAD ANGIO WO IV CONTRAST 9/9/2013 [facility]ERGENCY [facility]   • MR NECK ANGIO WO IV CONTRAST  9/9/2013    MR NECK ANGIO WO IV CONTRAST 9/9/2013 [facility] EMERGENCY LEGAC[facility]   • OTHER SURGICAL HISTORY  06/14/2013    Phlebotomy (Therapeutic)   • OTHER SURGICAL HISTORY  06/14/2013    Stress Test ECG Performed     No family history on file.  Social History     Tobacco Use   • Smoking status: Not on file   • Smokeless tobacco: Not on file   Substance Use Topics   • Alcohol use: Not on file   • Drug use: Not on file       Physical Exam   ED Triage Vitals   Temperature Heart Rate Respirations BP   04/18/24 1307 04/18/24 1343 04/18/24 1307 04/18/24 1307   36.3 °C (97.4 °F) 67 20 133/75      Pulse Ox Temp src Heart Rate Source Patient Position   04/18/24 1307 -- 04/18/24 1343 04/18/24 1343   94 %  Monitor Lying      BP Location FiO2 (%)     04/18/24 1343 --     Right arm        Physical Exam    ED Course & [facility]   ED Course as of 04/18/24 1823   Thu Apr 18, 2024   1818 EKG on interpretation shows normal sinus rhythm left axis deviation rate mid 80s with no ischemic change.  CBC with differential shows no leukocytosis stable hemoglobin chemistry shows stable CKD, troponin 23 direct troponin pending CT head negative chest ray negative.  Awaiting urinalysis.  Anticipate patient will be hospital for further care. [MT]      ED Course User  Index  [MT] Armani Chino MD         Diagnoses as of 04/18/24 1823   TIA (transient ischemic attack)       Medical Decision Making      Procedure  Procedures     Armani Chino MD  04/18/24 1823

## 2024-04-19 ENCOUNTER — APPOINTMENT (OUTPATIENT)
Dept: RADIOLOGY | Facility: HOSPITAL | Age: 89
End: 2024-04-19
Payer: MEDICARE

## 2024-04-19 ENCOUNTER — APPOINTMENT (OUTPATIENT)
Dept: CARDIOLOGY | Facility: HOSPITAL | Age: 89
End: 2024-04-19
Payer: MEDICARE

## 2024-04-19 LAB
ALBUMIN SERPL BCP-MCNC: 3.5 G/DL (ref 3.4–5)
ALP SERPL-CCNC: 86 U/L (ref 33–136)
ALT SERPL W P-5'-P-CCNC: 71 U/L (ref 7–45)
ANION GAP SERPL CALC-SCNC: 17 MMOL/L (ref 10–20)
APPEARANCE UR: CLEAR
AST SERPL W P-5'-P-CCNC: 64 U/L (ref 9–39)
ATRIAL RATE: 78 BPM
BILIRUB SERPL-MCNC: 0.7 MG/DL (ref 0–1.2)
BILIRUB UR STRIP.AUTO-MCNC: NEGATIVE MG/DL
BUN SERPL-MCNC: 47 MG/DL (ref 6–23)
CALCIUM SERPL-MCNC: 8.5 MG/DL (ref 8.6–10.3)
CARDIAC TROPONIN I PNL SERPL HS: 32 NG/L (ref 0–13)
CHLORIDE SERPL-SCNC: 115 MMOL/L (ref 98–107)
CO2 SERPL-SCNC: 17 MMOL/L (ref 21–32)
COLOR UR: ABNORMAL
CREAT SERPL-MCNC: 2.03 MG/DL (ref 0.5–1.05)
EGFRCR SERPLBLD CKD-EPI 2021: 22 ML/MIN/1.73M*2
ERYTHROCYTE [DISTWIDTH] IN BLOOD BY AUTOMATED COUNT: 12.6 % (ref 11.5–14.5)
EST. AVERAGE GLUCOSE BLD GHB EST-MCNC: 105 MG/DL
GLUCOSE SERPL-MCNC: 98 MG/DL (ref 74–99)
GLUCOSE UR STRIP.AUTO-MCNC: NORMAL MG/DL
HBA1C MFR BLD: 5.3 %
HCT VFR BLD AUTO: 36.3 % (ref 36–46)
HGB BLD-MCNC: 12.1 G/DL (ref 12–16)
HYALINE CASTS #/AREA URNS AUTO: ABNORMAL /LPF
KETONES UR STRIP.AUTO-MCNC: ABNORMAL MG/DL
LACTATE SERPL-SCNC: 1.1 MMOL/L (ref 0.4–2)
LEUKOCYTE ESTERASE UR QL STRIP.AUTO: ABNORMAL
MCH RBC QN AUTO: 33.8 PG (ref 26–34)
MCHC RBC AUTO-ENTMCNC: 33.3 G/DL (ref 32–36)
MCV RBC AUTO: 101 FL (ref 80–100)
NITRITE UR QL STRIP.AUTO: NEGATIVE
NRBC BLD-RTO: 0 /100 WBCS (ref 0–0)
P AXIS: -23 DEGREES
P OFFSET: 174 MS
P ONSET: 130 MS
PH UR STRIP.AUTO: 5.5 [PH]
PLATELET # BLD AUTO: 210 X10*3/UL (ref 150–450)
POTASSIUM SERPL-SCNC: 4.9 MMOL/L (ref 3.5–5.3)
POTASSIUM SERPL-SCNC: 5.1 MMOL/L (ref 3.5–5.3)
PR INTERVAL: 182 MS
PROT SERPL-MCNC: 5.9 G/DL (ref 6.4–8.2)
PROT UR STRIP.AUTO-MCNC: NEGATIVE MG/DL
Q ONSET: 221 MS
QRS COUNT: 13 BEATS
QRS DURATION: 110 MS
QT INTERVAL: 412 MS
QTC CALCULATION(BAZETT): 469 MS
QTC FREDERICIA: 449 MS
R AXIS: -47 DEGREES
RBC # BLD AUTO: 3.58 X10*6/UL (ref 4–5.2)
RBC # UR STRIP.AUTO: ABNORMAL /UL
RBC #/AREA URNS AUTO: ABNORMAL /HPF
SODIUM SERPL-SCNC: 144 MMOL/L (ref 136–145)
SP GR UR STRIP.AUTO: 1.02
T AXIS: 89 DEGREES
T OFFSET: 427 MS
UROBILINOGEN UR STRIP.AUTO-MCNC: NORMAL MG/DL
VENTRICULAR RATE: 78 BPM
WBC # BLD AUTO: 7.1 X10*3/UL (ref 4.4–11.3)
WBC #/AREA URNS AUTO: ABNORMAL /HPF

## 2024-04-19 PROCEDURE — 70547 MR ANGIOGRAPHY NECK W/O DYE: CPT | Performed by: RADIOLOGY

## 2024-04-19 PROCEDURE — 96361 HYDRATE IV INFUSION ADD-ON: CPT | Performed by: INTERNAL MEDICINE

## 2024-04-19 PROCEDURE — 36415 COLL VENOUS BLD VENIPUNCTURE: CPT | Performed by: NURSE PRACTITIONER

## 2024-04-19 PROCEDURE — 70551 MRI BRAIN STEM W/O DYE: CPT

## 2024-04-19 PROCEDURE — 85027 COMPLETE CBC AUTOMATED: CPT | Performed by: NURSE PRACTITIONER

## 2024-04-19 PROCEDURE — 70544 MR ANGIOGRAPHY HEAD W/O DYE: CPT | Mod: 59

## 2024-04-19 PROCEDURE — 2500000004 HC RX 250 GENERAL PHARMACY W/ HCPCS (ALT 636 FOR OP/ED): Performed by: NURSE PRACTITIONER

## 2024-04-19 PROCEDURE — 70547 MR ANGIOGRAPHY NECK W/O DYE: CPT

## 2024-04-19 PROCEDURE — 2500000004 HC RX 250 GENERAL PHARMACY W/ HCPCS (ALT 636 FOR OP/ED): Performed by: INTERNAL MEDICINE

## 2024-04-19 PROCEDURE — 2500000001 HC RX 250 WO HCPCS SELF ADMINISTERED DRUGS (ALT 637 FOR MEDICARE OP): Performed by: NURSE PRACTITIONER

## 2024-04-19 PROCEDURE — 81001 URINALYSIS AUTO W/SCOPE: CPT | Performed by: EMERGENCY MEDICINE

## 2024-04-19 PROCEDURE — G0378 HOSPITAL OBSERVATION PER HR: HCPCS

## 2024-04-19 PROCEDURE — 93306 TTE W/DOPPLER COMPLETE: CPT

## 2024-04-19 PROCEDURE — 70544 MR ANGIOGRAPHY HEAD W/O DYE: CPT | Performed by: RADIOLOGY

## 2024-04-19 PROCEDURE — 84075 ASSAY ALKALINE PHOSPHATASE: CPT | Performed by: NURSE PRACTITIONER

## 2024-04-19 PROCEDURE — 87086 URINE CULTURE/COLONY COUNT: CPT | Mod: AHULAB | Performed by: EMERGENCY MEDICINE

## 2024-04-19 PROCEDURE — 99221 1ST HOSP IP/OBS SF/LOW 40: CPT | Performed by: PSYCHIATRY & NEUROLOGY

## 2024-04-19 PROCEDURE — 96375 TX/PRO/DX INJ NEW DRUG ADDON: CPT | Performed by: INTERNAL MEDICINE

## 2024-04-19 PROCEDURE — 70551 MRI BRAIN STEM W/O DYE: CPT | Performed by: RADIOLOGY

## 2024-04-19 PROCEDURE — 96372 THER/PROPH/DIAG INJ SC/IM: CPT | Performed by: NURSE PRACTITIONER

## 2024-04-19 PROCEDURE — 99222 1ST HOSP IP/OBS MODERATE 55: CPT | Performed by: NURSE PRACTITIONER

## 2024-04-19 RX ORDER — RISPERIDONE 0.5 MG/1
0.25 TABLET ORAL 2 TIMES DAILY
COMMUNITY
End: 2024-05-16 | Stop reason: HOSPADM

## 2024-04-19 RX ORDER — MIRTAZAPINE 15 MG/1
15 TABLET, FILM COATED ORAL EVERY EVENING
COMMUNITY
End: 2024-05-16 | Stop reason: HOSPADM

## 2024-04-19 RX ORDER — AMLODIPINE BESYLATE 10 MG/1
10 TABLET ORAL DAILY
Status: DISCONTINUED | OUTPATIENT
Start: 2024-04-19 | End: 2024-04-20 | Stop reason: HOSPADM

## 2024-04-19 RX ORDER — BUSPIRONE HYDROCHLORIDE 10 MG/1
10 TABLET ORAL 3 TIMES DAILY
COMMUNITY

## 2024-04-19 RX ORDER — ACETAMINOPHEN 500 MG
1000 TABLET ORAL 2 TIMES DAILY
COMMUNITY

## 2024-04-19 RX ORDER — RISPERIDONE 0.5 MG/1
0.5 TABLET ORAL 2 TIMES DAILY
COMMUNITY
End: 2024-05-16 | Stop reason: HOSPADM

## 2024-04-19 RX ORDER — POTASSIUM CHLORIDE 20 MEQ/1
40 TABLET, EXTENDED RELEASE ORAL DAILY
COMMUNITY

## 2024-04-19 RX ORDER — FUROSEMIDE 40 MG/1
40 TABLET ORAL DAILY
COMMUNITY
End: 2024-04-20 | Stop reason: HOSPADM

## 2024-04-19 RX ORDER — RISPERIDONE 0.25 MG/1
0.25 TABLET ORAL 2 TIMES DAILY
Status: DISCONTINUED | OUTPATIENT
Start: 2024-04-19 | End: 2024-04-20 | Stop reason: HOSPADM

## 2024-04-19 RX ORDER — MIRTAZAPINE 15 MG/1
15 TABLET, FILM COATED ORAL NIGHTLY
Status: DISCONTINUED | OUTPATIENT
Start: 2024-04-19 | End: 2024-04-20 | Stop reason: HOSPADM

## 2024-04-19 RX ORDER — LORAZEPAM 1 MG/1
1 TABLET ORAL ONCE AS NEEDED
Status: COMPLETED | OUTPATIENT
Start: 2024-04-19 | End: 2024-04-19

## 2024-04-19 RX ORDER — KETOCONAZOLE 20 MG/ML
SHAMPOO, SUSPENSION TOPICAL
COMMUNITY

## 2024-04-19 RX ORDER — LISINOPRIL 20 MG/1
20 TABLET ORAL DAILY
Status: DISCONTINUED | OUTPATIENT
Start: 2024-04-19 | End: 2024-04-20 | Stop reason: HOSPADM

## 2024-04-19 RX ORDER — LISINOPRIL 20 MG/1
20 TABLET ORAL DAILY
COMMUNITY
End: 2024-04-20 | Stop reason: HOSPADM

## 2024-04-19 RX ORDER — FUROSEMIDE 40 MG/1
40 TABLET ORAL DAILY
Status: DISCONTINUED | OUTPATIENT
Start: 2024-04-19 | End: 2024-04-20 | Stop reason: HOSPADM

## 2024-04-19 RX ORDER — NYSTATIN 100000 [USP'U]/G
1 POWDER TOPICAL 2 TIMES DAILY
COMMUNITY

## 2024-04-19 RX ORDER — ASPIRIN 81 MG
100 TABLET, DELAYED RELEASE (ENTERIC COATED) ORAL 2 TIMES DAILY
COMMUNITY

## 2024-04-19 RX ORDER — AMLODIPINE BESYLATE 10 MG/1
10 TABLET ORAL DAILY
COMMUNITY
End: 2024-04-20 | Stop reason: HOSPADM

## 2024-04-19 RX ORDER — LOPERAMIDE HYDROCHLORIDE 2 MG/1
2 CAPSULE ORAL AS NEEDED
COMMUNITY

## 2024-04-19 RX ORDER — BUSPIRONE HYDROCHLORIDE 10 MG/1
10 TABLET ORAL EVERY 8 HOURS PRN
Status: DISCONTINUED | OUTPATIENT
Start: 2024-04-19 | End: 2024-04-20 | Stop reason: HOSPADM

## 2024-04-19 RX ORDER — ADHESIVE BANDAGE
30 BANDAGE TOPICAL DAILY PRN
COMMUNITY

## 2024-04-19 RX ORDER — CHOLECALCIFEROL (VITAMIN D3) 50 MCG
50 TABLET ORAL DAILY
COMMUNITY
End: 2024-05-16 | Stop reason: HOSPADM

## 2024-04-19 RX ORDER — ACETAMINOPHEN 325 MG/1
650 TABLET ORAL EVERY 4 HOURS PRN
COMMUNITY

## 2024-04-19 RX ORDER — ONDANSETRON 4 MG/1
4 TABLET, ORALLY DISINTEGRATING ORAL EVERY 6 HOURS PRN
COMMUNITY

## 2024-04-19 RX ADMIN — PANTOPRAZOLE SODIUM 40 MG: 40 TABLET, DELAYED RELEASE ORAL at 06:22

## 2024-04-19 RX ADMIN — SODIUM CHLORIDE 100 ML/HR: 9 INJECTION, SOLUTION INTRAVENOUS at 06:24

## 2024-04-19 RX ADMIN — MIRTAZAPINE 15 MG: 15 TABLET, FILM COATED ORAL at 20:03

## 2024-04-19 RX ADMIN — RISPERIDONE 0.25 MG: 0.25 TABLET, FILM COATED ORAL at 20:03

## 2024-04-19 RX ADMIN — HEPARIN SODIUM 5000 UNITS: 5000 INJECTION INTRAVENOUS; SUBCUTANEOUS at 09:29

## 2024-04-19 RX ADMIN — LORAZEPAM 1 MG: 1 TABLET ORAL at 20:03

## 2024-04-19 RX ADMIN — ATORVASTATIN CALCIUM 40 MG: 40 TABLET, FILM COATED ORAL at 20:03

## 2024-04-19 RX ADMIN — HEPARIN SODIUM 5000 UNITS: 5000 INJECTION INTRAVENOUS; SUBCUTANEOUS at 20:04

## 2024-04-19 RX ADMIN — HEPARIN SODIUM 5000 UNITS: 5000 INJECTION INTRAVENOUS; SUBCUTANEOUS at 01:42

## 2024-04-19 RX ADMIN — SODIUM CHLORIDE 500 ML: 9 INJECTION, SOLUTION INTRAVENOUS at 05:34

## 2024-04-19 RX ADMIN — Medication 3 MG: at 20:03

## 2024-04-19 RX ADMIN — ASPIRIN 81 MG: 81 TABLET, COATED ORAL at 09:31

## 2024-04-19 SDOH — ECONOMIC STABILITY: HOUSING INSECURITY: IN THE LAST 12 MONTHS, HOW MANY PLACES HAVE YOU LIVED?: 1

## 2024-04-19 SDOH — SOCIAL STABILITY: SOCIAL INSECURITY: HAVE YOU HAD THOUGHTS OF HARMING ANYONE ELSE?: UNABLE TO ASSESS

## 2024-04-19 SDOH — SOCIAL STABILITY: SOCIAL INSECURITY: ARE THERE ANY APPARENT SIGNS OF INJURIES/BEHAVIORS THAT COULD BE RELATED TO ABUSE/NEGLECT?: UNABLE TO ASSESS

## 2024-04-19 SDOH — SOCIAL STABILITY: SOCIAL INSECURITY: HAS ANYONE EVER THREATENED TO HURT YOUR FAMILY OR YOUR PETS?: UNABLE TO ASSESS

## 2024-04-19 SDOH — SOCIAL STABILITY: SOCIAL INSECURITY: WERE YOU ABLE TO COMPLETE ALL THE BEHAVIORAL HEALTH SCREENINGS?: NO

## 2024-04-19 SDOH — SOCIAL STABILITY: SOCIAL INSECURITY: DO YOU FEEL UNSAFE GOING BACK TO THE PLACE WHERE YOU ARE LIVING?: UNABLE TO ASSESS

## 2024-04-19 SDOH — SOCIAL STABILITY: SOCIAL INSECURITY: DO YOU FEEL ANYONE HAS EXPLOITED OR TAKEN ADVANTAGE OF YOU FINANCIALLY OR OF YOUR PERSONAL PROPERTY?: UNABLE TO ASSESS

## 2024-04-19 SDOH — SOCIAL STABILITY: SOCIAL INSECURITY: HAVE YOU HAD ANY THOUGHTS OF HARMING ANYONE ELSE?: UNABLE TO ASSESS

## 2024-04-19 SDOH — SOCIAL STABILITY: SOCIAL INSECURITY: DOES ANYONE TRY TO KEEP YOU FROM HAVING/CONTACTING OTHER FRIENDS OR DOING THINGS OUTSIDE YOUR HOME?: UNABLE TO ASSESS

## 2024-04-19 SDOH — SOCIAL STABILITY: SOCIAL INSECURITY: ABUSE: ADULT

## 2024-04-19 SDOH — SOCIAL STABILITY: SOCIAL INSECURITY: ARE YOU OR HAVE YOU BEEN THREATENED OR ABUSED PHYSICALLY, EMOTIONALLY, OR SEXUALLY BY ANYONE?: UNABLE TO ASSESS

## 2024-04-19 ASSESSMENT — COGNITIVE AND FUNCTIONAL STATUS - GENERAL
WALKING IN HOSPITAL ROOM: A LOT
MOVING FROM LYING ON BACK TO SITTING ON SIDE OF FLAT BED WITH BEDRAILS: A LITTLE
EATING MEALS: A LOT
DRESSING REGULAR LOWER BODY CLOTHING: TOTAL
EATING MEALS: A LOT
MOVING TO AND FROM BED TO CHAIR: A LOT
MOVING TO AND FROM BED TO CHAIR: A LOT
MOVING FROM LYING ON BACK TO SITTING ON SIDE OF FLAT BED WITH BEDRAILS: A LITTLE
TURNING FROM BACK TO SIDE WHILE IN FLAT BAD: A LOT
MOVING TO AND FROM BED TO CHAIR: A LOT
EATING MEALS: A LOT
DRESSING REGULAR UPPER BODY CLOTHING: TOTAL
WALKING IN HOSPITAL ROOM: A LOT
TOILETING: A LOT
MOVING TO AND FROM BED TO CHAIR: A LOT
MOBILITY SCORE: 12
HELP NEEDED FOR BATHING: TOTAL
STANDING UP FROM CHAIR USING ARMS: A LOT
PERSONAL GROOMING: A LOT
STANDING UP FROM CHAIR USING ARMS: A LOT
TURNING FROM BACK TO SIDE WHILE IN FLAT BAD: A LOT
CLIMB 3 TO 5 STEPS WITH RAILING: A LOT
DRESSING REGULAR LOWER BODY CLOTHING: TOTAL
MOVING FROM LYING ON BACK TO SITTING ON SIDE OF FLAT BED WITH BEDRAILS: A LOT
PERSONAL GROOMING: A LOT
TURNING FROM BACK TO SIDE WHILE IN FLAT BAD: A LOT
PATIENT BASELINE BEDBOUND: UNABLE TO ASSESS AT THIS TIME
DAILY ACTIVITIY SCORE: 9
TURNING FROM BACK TO SIDE WHILE IN FLAT BAD: A LOT
DRESSING REGULAR UPPER BODY CLOTHING: A LOT
MOBILITY SCORE: 13
TOILETING: A LOT
CLIMB 3 TO 5 STEPS WITH RAILING: A LOT
DRESSING REGULAR UPPER BODY CLOTHING: A LOT
DRESSING REGULAR LOWER BODY CLOTHING: TOTAL
MOVING FROM LYING ON BACK TO SITTING ON SIDE OF FLAT BED WITH BEDRAILS: A LITTLE
WALKING IN HOSPITAL ROOM: A LOT
EATING MEALS: A LOT
STANDING UP FROM CHAIR USING ARMS: A LOT
MOVING TO AND FROM BED TO CHAIR: A LOT
TOILETING: A LOT
PERSONAL GROOMING: A LOT
EATING MEALS: A LOT
DAILY ACTIVITIY SCORE: 12
DRESSING REGULAR UPPER BODY CLOTHING: TOTAL
DAILY ACTIVITIY SCORE: 12
HELP NEEDED FOR BATHING: TOTAL
TOILETING: A LOT
WALKING IN HOSPITAL ROOM: A LOT
TOILETING: A LOT
STANDING UP FROM CHAIR USING ARMS: A LOT
TURNING FROM BACK TO SIDE WHILE IN FLAT BAD: A LOT
MOBILITY SCORE: 13
HELP NEEDED FOR BATHING: TOTAL
HELP NEEDED FOR BATHING: A LOT
WALKING IN HOSPITAL ROOM: A LOT
MOVING FROM LYING ON BACK TO SITTING ON SIDE OF FLAT BED WITH BEDRAILS: A LOT
EATING MEALS: A LOT
MOBILITY SCORE: 12
WALKING IN HOSPITAL ROOM: A LOT
MOVING TO AND FROM BED TO CHAIR: A LOT
DRESSING REGULAR LOWER BODY CLOTHING: A LOT
CLIMB 3 TO 5 STEPS WITH RAILING: A LOT
TURNING FROM BACK TO SIDE WHILE IN FLAT BAD: A LOT
STANDING UP FROM CHAIR USING ARMS: A LOT
WALKING IN HOSPITAL ROOM: A LOT
MOVING FROM LYING ON BACK TO SITTING ON SIDE OF FLAT BED WITH BEDRAILS: A LITTLE
EATING MEALS: A LOT
TURNING FROM BACK TO SIDE WHILE IN FLAT BAD: A LOT
TOILETING: A LOT
STANDING UP FROM CHAIR USING ARMS: A LOT
TOILETING: A LOT
CLIMB 3 TO 5 STEPS WITH RAILING: A LOT
PERSONAL GROOMING: A LOT
MOVING FROM LYING ON BACK TO SITTING ON SIDE OF FLAT BED WITH BEDRAILS: A LITTLE
CLIMB 3 TO 5 STEPS WITH RAILING: A LOT
PERSONAL GROOMING: A LOT
HELP NEEDED FOR BATHING: TOTAL
HELP NEEDED FOR BATHING: A LOT
STANDING UP FROM CHAIR USING ARMS: A LOT
STANDING UP FROM CHAIR USING ARMS: A LOT
MOVING FROM LYING ON BACK TO SITTING ON SIDE OF FLAT BED WITH BEDRAILS: A LOT
MOVING TO AND FROM BED TO CHAIR: A LOT
HELP NEEDED FOR BATHING: TOTAL
DRESSING REGULAR LOWER BODY CLOTHING: TOTAL
PERSONAL GROOMING: A LOT
DRESSING REGULAR UPPER BODY CLOTHING: TOTAL
DAILY ACTIVITIY SCORE: 9
MOVING TO AND FROM BED TO CHAIR: A LOT
CLIMB 3 TO 5 STEPS WITH RAILING: A LOT
WALKING IN HOSPITAL ROOM: A LOT
MOBILITY SCORE: 12
PERSONAL GROOMING: A LOT
CLIMB 3 TO 5 STEPS WITH RAILING: A LOT
CLIMB 3 TO 5 STEPS WITH RAILING: A LOT
DRESSING REGULAR LOWER BODY CLOTHING: TOTAL
DRESSING REGULAR UPPER BODY CLOTHING: TOTAL
TURNING FROM BACK TO SIDE WHILE IN FLAT BAD: A LOT
DRESSING REGULAR UPPER BODY CLOTHING: TOTAL
DRESSING REGULAR LOWER BODY CLOTHING: A LOT

## 2024-04-19 ASSESSMENT — LIFESTYLE VARIABLES
AUDIT-C TOTAL SCORE: -1
SKIP TO QUESTIONS 9-10: 0
HOW MANY STANDARD DRINKS CONTAINING ALCOHOL DO YOU HAVE ON A TYPICAL DAY: PATIENT UNABLE TO ANSWER
HOW OFTEN DO YOU HAVE A DRINK CONTAINING ALCOHOL: PATIENT UNABLE TO ANSWER
HOW OFTEN DO YOU HAVE 6 OR MORE DRINKS ON ONE OCCASION: PATIENT UNABLE TO ANSWER
AUDIT-C TOTAL SCORE: -1

## 2024-04-19 ASSESSMENT — ACTIVITIES OF DAILY LIVING (ADL)
DRESSING YOURSELF: DEPENDENT
FEEDING YOURSELF: DEPENDENT
HEARING - RIGHT EAR: FUNCTIONAL
PATIENT'S MEMORY ADEQUATE TO SAFELY COMPLETE DAILY ACTIVITIES?: NO
GROOMING: DEPENDENT
LACK_OF_TRANSPORTATION: NO
TOILETING: DEPENDENT
BATHING: DEPENDENT
HEARING - LEFT EAR: FUNCTIONAL
JUDGMENT_ADEQUATE_SAFELY_COMPLETE_DAILY_ACTIVITIES: NO
WALKS IN HOME: NEEDS ASSISTANCE
ADEQUATE_TO_COMPLETE_ADL: NO
LACK_OF_TRANSPORTATION: PATIENT UNABLE TO ANSWER

## 2024-04-19 ASSESSMENT — PAIN SCALES - PAIN ASSESSMENT IN ADVANCED DEMENTIA (PAINAD)
BODYLANGUAGE: RELAXED
TOTALSCORE: 0
FACIALEXPRESSION: SMILING OR INEXPRESSIVE
BREATHING: NORMAL
CONSOLABILITY: NO NEED TO CONSOLE

## 2024-04-19 NOTE — PROGRESS NOTES
Pharmacy Medication History Review    Juanis Cornejo is a 89 y.o. female admitted for TIA (transient ischemic attack). Pharmacy reviewed the patient's [id number] medications and allergies for accuracy.    The list below reflectives the updated PTA list. Please review each medication in order reconciliation for additional clarification and justification.  Prior to Admission Medications   Prescriptions Last Dose Informant   acetaminophen (Tylenol) 325 mg tablet Unknown Other   Sig: Take 2 tablets (650 mg) by mouth every 4 hours if needed for mild pain (1 - 3).   acetaminophen (Tylenol) 500 mg tablet Unknown Other   Sig: Take 2 tablets (1,000 mg) by mouth 2 times a day.   amLODIPine (Norvasc) 10 mg tablet Unknown Other   Sig: Take 1 tablet (10 mg) by mouth once daily.   busPIRone (Buspar) 10 mg tablet Unknown Other   Sig: Take 1 tablet (10 mg) by mouth 3 times a day.   cholecalciferol (Vitamin D3) 50 MCG (2000 UT) tablet Unknown Other   Sig: Take 1 tablet (50 mcg) by mouth once daily.   docusate sodium (Colace) 100 mg tablet Unknown Other   Sig: Take 1 tablet (100 mg) by mouth 2 times a day.   furosemide (Lasix) 40 mg tablet Unknown Other   Sig: Take 1 tablet (40 mg) by mouth once daily.   ketoconazole (NIZOral) 2 % shampoo Unknown Other   Sig: Apply topically. Use as directed every 4 days   lisinopril 20 mg tablet Unknown Other   Sig: Take 1 tablet (20 mg) by mouth once daily.   loperamide (Imodium) 2 mg capsule Unknown Other   Sig: Take 1 capsule (2 mg) by mouth if needed for diarrhea.   lubricating eye drops ophthalmic solution Unknown Other   Sig: Administer 2 drops into both eyes 2 times a day.   magnesium hydroxide (Milk of Magnesia) 400 mg/5 mL suspension Unknown Other   Sig: Take 30 mL by mouth once daily as needed for constipation.   mirtazapine (Remeron) 15 mg tablet Unknown Other   Sig: Take 1 tablet (15 mg) by mouth once daily at bedtime.   nystatin (Mycostatin) 100,000 unit/gram powder Unknown Other    Sig: Apply 1 Application topically 2 times a day. To groin (under breast three times a day as needed after cleaning and drying)   ondansetron ODT (Zofran-ODT) 4 mg disintegrating tablet Unknown Other   Sig: Take 1 tablet (4 mg) by mouth every 6 hours if needed for nausea or vomiting.   potassium chloride CR 20 mEq ER tablet Unknown Other   Sig: Take 2 tablets (40 mEq) by mouth once daily. Do not crush or chew.   risperiDONE (RisperDAL) 0.5 mg tablet Unknown Other   Sig: Take 1 tablet (0.5 mg) by mouth 2 times a day.   risperiDONE (RisperDAL) 0.5 mg tablet Unknown Other   Sig: Take 0.5 tablets (0.25 mg) by mouth 2 times a day as needed (schizophrenia).      Facility-Administered Medications: None         The list below reflectives the updated allergy list. Please review each documented allergy for additional clarification and justification.  Allergies  Reviewed by Armani Chino MD on 4/18/2024   Not on File         Below are additional concerns with the patient's PTA list.  [facility]

## 2024-04-19 NOTE — PROGRESS NOTES
04/19/24 0653   [facility] Disability Status   Are you deaf or do you have serious difficulty hearing? N   Are you blind or do you have serious difficulty seeing, even when wearing glasses? N   Because of a physical, mental, or emotional condition, do you have serious difficulty concentrating, remembering, or making decisions? (5 years old or older) Y   Do you have serious difficulty walking or climbing stairs? Y   Do you have serious difficulty dressing or bathing? Y   Because of a physical, mental, or emotional condition, do you have serious difficulty doing errands alone such as visiting the doctor? Y

## 2024-04-19 NOTE — PROGRESS NOTES
Transitional Care Coordination Progress Note:  Plan per Medical/Surgical team: treatment of R/O TIA with right sided weakness & MARYANN with IV fluids, neuro consult, ECHO & MRI pending  Status: Observation  Payor source: [employer] medicare  Discharge disposition: [facility]  [phone]  Potential Barriers: dementia, renal 47/2.203  ADOD: 4/20/2024  CAN Gay RN, BSN Transitional Care Coordinator ED# [phone]      04/19/24 0653   Discharge Planning   Living Arrangements Alone   Support Systems Children   Assistance Needed neuro work up   Type of Residence Assisted living   Do you have animals or pets at home? No   Care Facility Name [facility]  [phone]   Home or Post Acute Services Post acute facilities (Rehab/SNF/etc)   Type of Post Acute Facility Services Assisted living   Patient expects to be discharged to: [facility]  [phone]   Does the patient need discharge transport arranged? Yes   RoundTrip coordination needed? Yes   Has discharge transport been arranged? No   Financial Resource Strain   How hard is it for you to pay for the very basics like food, housing, medical care, and heating? Not hard   Housing Stability   In the last 12 months, was there a time when you were not able to pay the mortgage or rent on time? N   In the last 12 months, how many places have you lived? 1   In the last 12 months, was there a time when you did not have a steady place to sleep or slept in a shelter (including now)? N   Transportation Needs   In the past 12 months, has lack of transportation kept you from medical appointments or from getting medications? no   In the past 12 months, has lack of transportation kept you from meetings, work, or from getting things needed for daily living? No

## 2024-04-19 NOTE — PROGRESS NOTES
[facility]  [phone]     04/19/24 0653   Current Planned Discharge Disposition   Current Planned Discharge Disposition Inter

## 2024-04-19 NOTE — CONSULTS
"Inpatient consult to Neurology  Consult performed by: Shaq Ames MD  Consult ordered by: CASH Dawkins-CNP          History Of Present Illness  Juanis Cornejo is a 89 y.o. female presenting with transient right hemiparesis, rule out stroke.    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  She resides at a facility.    She presented to the [facility] ED via EMS yesterday after noted by staff at her facility to have right-sided weakness.  Details are not provided in the chart, and it is not entirely clear if this was face/arm/leg weakness or just a portion thereof.  Apparently EMS on arrival did not find any lateralized weakness.  Patient appeared to be at baseline on arrival to the ED.    At present she is unable to provide any history and she is able to provide very little review of systems.  Her speech pattern is soft and indistinct and it is quite hard to understand what she is saying.  She indicates that she may have pain in \"various areas\" but cannot localize anything.  She cannot tell me if she was or is subjectively weak in any body region.    Labs on presentation showed normal white count of 9.4, normal hematocrit of 41, hyperkalemia 5.4, normal sodium, glucose 100, creatinine 2.15 which appears to be above baseline, elevated transaminases (ALT 85, AST 74) which has been noted previously to a lesser degree.    I reviewed a noncontrast head CT which shows prominent generalized but age-appropriate volume loss.  No hyperdense hemorrhage.    Echocardiogram was done, not yet officially read.    Brain MRI and head/neck MRAs are pending.    She has been started here on aspirin and atorvastatin.    Past Medical History  Past Medical History:   Diagnosis Date    Encounter for screening mammogram for malignant neoplasm of breast     " Visit for screening mammogram    Hemochromatosis, unspecified     Hemochromatosis    Pain in unspecified hand     Pain in hand    Personal history of other diseases of the circulatory system     History of hypertension    Personal history of other diseases of the nervous system and sense organs     History of cataract    Personal history of other infectious and parasitic diseases     History of herpes zoster     Surgical History  Past Surgical History:   Procedure Laterality Date    CARPAL TUNNEL RELEASE  06/14/2013    Neuroplasty Decompression Median Nerve At Carpal Tunnel    CATARACT EXTRACTION  06/14/2013    Cataract Surgery    COLONOSCOPY  06/14/2013    Complete Colonoscopy    MR HEAD ANGIO WO IV CONTRAST  9/9/2013    MR HEAD ANGIO WO IV CONTRAST 9/9/2013 [facility]    MR NECK ANGIO WO IV CONTRAST  9/9/2013    MR NECK ANGIO WO IV CONTRAST 9/9/2013 [facility]    OTHER SURGICAL HISTORY  06/14/2013    Phlebotomy (Therapeutic)    OTHER SURGICAL HISTORY  06/14/2013    Stress Test ECG Performed     Social History     Allergies  Patient has no allergy information on record.  Medications Prior to Admission   Medication Sig Dispense Refill Last Dose    acetaminophen (Tylenol) 325 mg tablet Take 2 tablets (650 mg) by mouth every 4 hours if needed for mild pain (1 - 3).   Unknown    acetaminophen (Tylenol) 500 mg tablet Take 2 tablets (1,000 mg) by mouth 2 times a day.   Unknown    amLODIPine (Norvasc) 10 mg tablet Take 1 tablet (10 mg) by mouth once daily.   Unknown    busPIRone (Buspar) 10 mg tablet Take 1 tablet (10 mg) by mouth 3 times a day.   Unknown    cholecalciferol (Vitamin D3) 50 MCG (2000 UT) tablet Take 1 tablet (50 mcg) by mouth once daily.   Unknown    docusate sodium (Colace) 100 mg tablet Take 1 tablet (100 mg) by mouth 2 times a day.   Unknown    furosemide (Lasix) 40 mg tablet Take 1 tablet (40 mg) by mouth once daily.   Unknown    ketoconazole (NIZOral) 2 % shampoo Apply topically.  Use as directed every 4 days   Unknown    lisinopril 20 mg tablet Take 1 tablet (20 mg) by mouth once daily.   Unknown    loperamide (Imodium) 2 mg capsule Take 1 capsule (2 mg) by mouth if needed for diarrhea.   Unknown    lubricating eye drops ophthalmic solution Administer 2 drops into both eyes 2 times a day.   Unknown    magnesium hydroxide (Milk of Magnesia) 400 mg/5 mL suspension Take 30 mL by mouth once daily as needed for constipation.   Unknown    mirtazapine (Remeron) 15 mg tablet Take 1 tablet (15 mg) by mouth once daily at bedtime.   Unknown    nystatin (Mycostatin) 100,000 unit/gram powder Apply 1 Application topically 2 times a day. To groin (under breast three times a day as needed after cleaning and drying)   Unknown    ondansetron ODT (Zofran-ODT) 4 mg disintegrating tablet Take 1 tablet (4 mg) by mouth every 6 hours if needed for nausea or vomiting.   Unknown    potassium chloride CR 20 mEq ER tablet Take 2 tablets (40 mEq) by mouth once daily. Do not crush or chew.   Unknown    risperiDONE (RisperDAL) 0.5 mg tablet Take 1 tablet (0.5 mg) by mouth 2 times a day.   Unknown    risperiDONE (RisperDAL) 0.5 mg tablet Take 0.5 tablets (0.25 mg) by mouth 2 times a day as needed (schizophrenia).   Unknown       Review of Systems    Review of Systems:  Neurologic:  As per the history of present illness.  Constitutional:  Negative for fever.  Respiratory:  Negative for dyspnea.  GI:  Negative for vomiting.        Neurological Exam  Physical Exam    Physical Examination:    General: Alert, sitting up in bed, sitter at bedside.  In no acute distress.    Mental Status: Awake throughout encounter.  Gave her last name.  I was unable to understand other responses such as her age, location, date.  She followed only a few commands, inconsistently, including raise right leg off the bed.  Most of her speech was unintelligible, which seem to be due to a combination of hypophonia and very indistinct articulation, but  "cannot exclude a component of aphasia.    Cranial Nerves: Pupils were difficult to visualize that she would not open her eyes sufficiently to allow for detailed exam.   There was no gaze deviation or gaze preference but she would not fully pursue either side.  Facial movements appeared symmetrically intact.  Hearing could not be accurately assessed.  Indistinct speech pattern as noted above.  She did not protrude the tongue.    Motor: Muscle tone was difficult to assess as she held both arms flexed at the elbows with hands clasped throughout the encounter.  She maintained a flexed posture at the left knee.  She gave weak  bilaterally.  She did not otherwise participate in upper extremity strength testing.  As above she was able to raise the right heel off the bed.    Coordination: No rest tremor, myoclonus, rhythmic clonic jerking, or tonic posturing.    Tendon Reflexes: Symmetrically 2+ biceps and brachioradialis.  2+ right patellar, difficult to elicit left patellar, neutral plantars.                Last Recorded Vitals  Blood pressure (!) 112/46, pulse 78, temperature 37.2 °C (99 °F), temperature source Temporal, resp. rate 18, height 1.56 m (5' 1.42\"), weight 46.5 kg (102 lb 8.2 oz), SpO2 97%.    Relevant Results        NIH Stroke Scale  1A. Level of Consciousness: Alert, Keenly Responsive  1B. Ask Month and Age: Both Questions Right  1C. Blink Eyes & Squeeze Hands: Performs Both Tasks  2. Best Gaze: Normal  3. Visual: No Visual Loss  4. Facial Palsy: Normal Symmetrical Movements  5A. Motor - Left Arm: No Drift  5B. Motor - Right Arm: No Drift  6A. Motor - Left Leg: No Drift  6B. Motor - Right Leg: No Drift  7. Limb Ataxia: Absent  8. Sensory Loss: Normal  9. Best Language: No Aphasia  10. Dysarthria: Normal  11. Extinction and Inattention: No Abnormality  NIH Stroke Scale: 0           [place] Coma Scale  Best Eye Response: Spontaneous  Best Verbal Response: Confused  Best Motor Response: Follows " commands  [place] Coma Scale Score: 14                 I have personally reviewed the following imaging results     CT brain attack head wo IV contrast    Result Date: 4/18/2024  Interpreted By:  Manolo Lara, STUDY: CT BRAIN ATTACK HEAD WO IV CONTRAST;  4/18/2024 1:12 pm   INDICATION: Signs/Symptoms:Stroke Evaluation.   COMPARISON: September 9, 2013 head CT, September 9, 2013 MRI brain   ACCESSION NUMBER(S): [id number]   ORDERING CLINICIAN: HILLARY ARMANDO   TECHNIQUE: Noncontrast axial CT scan of head was performed. Angled reformats in brain and bone windows were generated. The images were reviewed in bone, brain, blood and soft tissue windows.   FINDINGS: CSF Spaces: The ventricles, sulci and basal cisterns are within normal limits. There is no extraaxial fluid collection.   Parenchyma:  The grey-white differentiation is intact. There is no mass effect or midline shift.  There is no intracranial hemorrhage.   Calvarium: The calvarium is unremarkable.   Paranasal sinuses and mastoids: Minimal partial opacification of the right mastoid air cells. The paranasal sinuses are clear.       No evidence of acute cortical infarct or intracranial hemorrhage.   Atrophy and nonspecific low-density white matter changes.   Minimal partial opacification right mastoid air cells.   MACRO: Manolo Lara discussed the significance and urgency of this critical finding by secure chat with  HILLARY ARMANDO on 4/18/2024 at 1:38 pm.  (**-RCF-**) Findings:  See findings.     Signed by: Manolo Lara 4/18/2024 1:38 PM Dictation workstation:   [id number]       Assessment/Plan      This very elderly woman with dementia presents after an indeterminate episode at her facility.  She may have been transiently weak in the right limbs but it is unclear from the chart how this determination was arrived at.  She does not have obvious lateralized weakness at present.  If anything she appears to move the right leg more vigorously than the  left.    She has been started in-house on aspirin and atorvastatin.    MRI/MRA are pending and I will review when completed.    Echocardiogram report is also pending.          Shaq Ames MD

## 2024-04-20 VITALS
HEART RATE: 76 BPM | DIASTOLIC BLOOD PRESSURE: 75 MMHG | TEMPERATURE: 98.2 F | OXYGEN SATURATION: 100 % | WEIGHT: 102.51 LBS | BODY MASS INDEX: 19.35 KG/M2 | RESPIRATION RATE: 17 BRPM | HEIGHT: 61 IN | SYSTOLIC BLOOD PRESSURE: 115 MMHG

## 2024-04-20 LAB
ALBUMIN SERPL BCP-MCNC: 2.9 G/DL (ref 3.4–5)
ALP SERPL-CCNC: 77 U/L (ref 33–136)
ALT SERPL W P-5'-P-CCNC: 59 U/L (ref 7–45)
ANION GAP SERPL CALC-SCNC: 10 MMOL/L (ref 10–20)
AORTIC VALVE PEAK VELOCITY: 2.07 M/S
AST SERPL W P-5'-P-CCNC: 57 U/L (ref 9–39)
AV PEAK GRADIENT: 17.1 MMHG
AVA (PEAK VEL): 1.59 CM2
BILIRUB SERPL-MCNC: 0.5 MG/DL (ref 0–1.2)
BUN SERPL-MCNC: 32 MG/DL (ref 6–23)
CALCIUM SERPL-MCNC: 7.6 MG/DL (ref 8.6–10.3)
CHLORIDE SERPL-SCNC: 117 MMOL/L (ref 98–107)
CO2 SERPL-SCNC: 19 MMOL/L (ref 21–32)
CREAT SERPL-MCNC: 1.33 MG/DL (ref 0.5–1.05)
EGFRCR SERPLBLD CKD-EPI 2021: 37 ML/MIN/1.73M*2
EJECTION FRACTION APICAL 4 CHAMBER: 63.8
ERYTHROCYTE [DISTWIDTH] IN BLOOD BY AUTOMATED COUNT: 12.5 % (ref 11.5–14.5)
GLUCOSE SERPL-MCNC: 99 MG/DL (ref 74–99)
HCT VFR BLD AUTO: 33.9 % (ref 36–46)
HGB BLD-MCNC: 10.9 G/DL (ref 12–16)
LEFT VENTRICLE INTERNAL DIMENSION DIASTOLE: 3.13 CM (ref 3.5–6)
LEFT VENTRICULAR OUTFLOW TRACT DIAMETER: 1.9 CM
LV EJECTION FRACTION BIPLANE: 69 %
MCH RBC QN AUTO: 32.7 PG (ref 26–34)
MCHC RBC AUTO-ENTMCNC: 32.2 G/DL (ref 32–36)
MCV RBC AUTO: 102 FL (ref 80–100)
MITRAL VALVE E/A RATIO: 0.67
NRBC BLD-RTO: 0 /100 WBCS (ref 0–0)
PLATELET # BLD AUTO: 161 X10*3/UL (ref 150–450)
POTASSIUM SERPL-SCNC: 4 MMOL/L (ref 3.5–5.3)
PROT SERPL-MCNC: 5 G/DL (ref 6.4–8.2)
RBC # BLD AUTO: 3.33 X10*6/UL (ref 4–5.2)
RIGHT VENTRICLE FREE WALL PEAK S': 14.8 CM/S
SODIUM SERPL-SCNC: 142 MMOL/L (ref 136–145)
TRICUSPID ANNULAR PLANE SYSTOLIC EXCURSION: 2.4 CM
WBC # BLD AUTO: 5.1 X10*3/UL (ref 4.4–11.3)

## 2024-04-20 PROCEDURE — 2500000004 HC RX 250 GENERAL PHARMACY W/ HCPCS (ALT 636 FOR OP/ED): Performed by: NURSE PRACTITIONER

## 2024-04-20 PROCEDURE — 96372 THER/PROPH/DIAG INJ SC/IM: CPT | Performed by: NURSE PRACTITIONER

## 2024-04-20 PROCEDURE — 96375 TX/PRO/DX INJ NEW DRUG ADDON: CPT | Performed by: INTERNAL MEDICINE

## 2024-04-20 PROCEDURE — 80053 COMPREHEN METABOLIC PANEL: CPT | Performed by: NURSE PRACTITIONER

## 2024-04-20 PROCEDURE — 85027 COMPLETE CBC AUTOMATED: CPT | Performed by: NURSE PRACTITIONER

## 2024-04-20 PROCEDURE — C9113 INJ PANTOPRAZOLE SODIUM, VIA: HCPCS | Performed by: NURSE PRACTITIONER

## 2024-04-20 PROCEDURE — 36415 COLL VENOUS BLD VENIPUNCTURE: CPT | Performed by: NURSE PRACTITIONER

## 2024-04-20 PROCEDURE — 2500000001 HC RX 250 WO HCPCS SELF ADMINISTERED DRUGS (ALT 637 FOR MEDICARE OP): Performed by: NURSE PRACTITIONER

## 2024-04-20 PROCEDURE — 99233 SBSQ HOSP IP/OBS HIGH 50: CPT | Performed by: NURSE PRACTITIONER

## 2024-04-20 PROCEDURE — G0378 HOSPITAL OBSERVATION PER HR: HCPCS

## 2024-04-20 PROCEDURE — 96361 HYDRATE IV INFUSION ADD-ON: CPT | Mod: 59 | Performed by: INTERNAL MEDICINE

## 2024-04-20 RX ORDER — ATORVASTATIN CALCIUM 40 MG/1
40 TABLET, FILM COATED ORAL NIGHTLY
Qty: 30 TABLET | Refills: 0 | Status: SHIPPED | OUTPATIENT
Start: 2024-04-20 | End: 2024-05-20

## 2024-04-20 RX ORDER — ASPIRIN 81 MG/1
81 TABLET ORAL DAILY
Qty: 30 TABLET | Refills: 0 | Status: SHIPPED | OUTPATIENT
Start: 2024-04-21 | End: 2024-05-21

## 2024-04-20 RX ADMIN — PANTOPRAZOLE SODIUM 40 MG: 40 INJECTION, POWDER, FOR SOLUTION INTRAVENOUS at 06:00

## 2024-04-20 RX ADMIN — POLYETHYLENE GLYCOL 3350 17 G: 17 POWDER, FOR SOLUTION ORAL at 09:32

## 2024-04-20 RX ADMIN — SODIUM CHLORIDE, POTASSIUM CHLORIDE, SODIUM LACTATE AND CALCIUM CHLORIDE 1000 ML: 600; 310; 30; 20 INJECTION, SOLUTION INTRAVENOUS at 09:32

## 2024-04-20 RX ADMIN — RISPERIDONE 0.25 MG: 0.25 TABLET, FILM COATED ORAL at 09:32

## 2024-04-20 RX ADMIN — ASPIRIN 81 MG: 81 TABLET, COATED ORAL at 09:32

## 2024-04-20 RX ADMIN — HEPARIN SODIUM 5000 UNITS: 5000 INJECTION INTRAVENOUS; SUBCUTANEOUS at 04:01

## 2024-04-20 RX ADMIN — HEPARIN SODIUM 5000 UNITS: 5000 INJECTION INTRAVENOUS; SUBCUTANEOUS at 12:32

## 2024-04-20 RX ADMIN — SODIUM CHLORIDE, POTASSIUM CHLORIDE, SODIUM LACTATE AND CALCIUM CHLORIDE 1000 ML: 600; 310; 30; 20 INJECTION, SOLUTION INTRAVENOUS at 14:47

## 2024-04-20 ASSESSMENT — COGNITIVE AND FUNCTIONAL STATUS - GENERAL
HELP NEEDED FOR BATHING: TOTAL
DRESSING REGULAR UPPER BODY CLOTHING: TOTAL
MOBILITY SCORE: 13
WALKING IN HOSPITAL ROOM: A LOT
DAILY ACTIVITIY SCORE: 9
MOVING TO AND FROM BED TO CHAIR: A LOT
MOVING FROM LYING ON BACK TO SITTING ON SIDE OF FLAT BED WITH BEDRAILS: A LITTLE
PERSONAL GROOMING: A LOT
EATING MEALS: A LOT
CLIMB 3 TO 5 STEPS WITH RAILING: A LOT
STANDING UP FROM CHAIR USING ARMS: A LOT
TURNING FROM BACK TO SIDE WHILE IN FLAT BAD: A LOT
DRESSING REGULAR LOWER BODY CLOTHING: TOTAL
TOILETING: A LOT

## 2024-04-20 ASSESSMENT — PAIN SCALES - PAIN ASSESSMENT IN ADVANCED DEMENTIA (PAINAD)
BODYLANGUAGE: RELAXED
TOTALSCORE: 0
BREATHING: NORMAL
TOTALSCORE: 0
CONSOLABILITY: NO NEED TO CONSOLE
BODYLANGUAGE: RELAXED
FACIALEXPRESSION: SMILING OR INEXPRESSIVE
FACIALEXPRESSION: SMILING OR INEXPRESSIVE
CONSOLABILITY: NO NEED TO CONSOLE
BREATHING: NORMAL

## 2024-04-20 ASSESSMENT — PAIN SCALES - GENERAL: PAINLEVEL_OUTOF10: 0 - NO PAIN

## 2024-04-20 NOTE — PROGRESS NOTES
Juanis Cornejo is a 89 y.o. female who presented to ed on 4/18/2024 presenting with TIA (transient ischemic attack).    Subjective    Resting in bed, eyes closed. Awakens to voice. Denies any pain now. Not oriented.     Review of systems   Negative except as noted above        Objective   Physical Exam   Physical Exam  Vitals reviewed.   Constitutional:       General: She is not in acute distress.     Comments: frail   HENT:      Head: Normocephalic and atraumatic.      Nose: Nose normal.      Mouth/Throat:      Mouth: Mucous membranes are dry.      Pharynx: Oropharynx is clear.   Cardiovascular:      Rate and Rhythm: Normal rate and regular rhythm.      Pulses: Normal pulses.      Heart sounds: Normal heart sounds.   Pulmonary:      Effort: Pulmonary effort is normal. No respiratory distress.      Breath sounds: No wheezing or rhonchi.      Comments: Diminished bl bases  Chest:      Chest wall: No tenderness.   Abdominal:      General: Abdomen is flat. Bowel sounds are normal. There is no distension.      Palpations: Abdomen is soft.      Tenderness: There is no abdominal tenderness.   Musculoskeletal:         General: Normal range of motion.      Cervical back: Normal range of motion and neck supple.      Right lower leg: No edema.      Left lower leg: No edema.      Comments: Curled up in a ball, doesn't want to move or extend her legs. No focal weakness identified.   Skin:     General: Skin is warm and dry.      Capillary Refill: Capillary refill takes less than 2 seconds.   Neurological:      General: No focal deficit present.      Mental Status: She is alert. Mental status is at baseline. She is disoriented.   Psychiatric:         Mood and Affect: Mood normal.         Cognition and Memory: Cognition is impaired.         Last Recorded Vitals  BP 93/64 (BP Location: Left arm, Patient Position: Lying)   Pulse 56   Temp 36.3 °C (97.3 °F) (Temporal)   Resp 16   Wt 46.5 kg (102 lb 8.2 oz)   SpO2 100%    Intake/Output last 3 Shifts:    Intake/Output Summary (Last 24 hours) at 4/20/2024 1035  Last data filed at 4/19/2024 1800  Gross per 24 hour   Intake 480 ml   Output 100 ml   Net 380 ml     Admission Weight  Weight: 46.5 kg (102 lb 8.2 oz) (04/18/24 2228)  Daily Weight  04/18/24 : 46.5 kg (102 lb 8.2 oz)      Medications   Scheduled medications  [Held by provider] amLODIPine, 10 mg, oral, Daily  aspirin, 81 mg, oral, Daily  atorvastatin, 40 mg, oral, Nightly  [Held by provider] furosemide, 40 mg, oral, Daily  heparin (porcine), 5,000 Units, subcutaneous, q8h  [Held by provider] lisinopril, 20 mg, oral, Daily  melatonin, 3 mg, oral, Nightly  mirtazapine, 15 mg, oral, Nightly  pantoprazole, 40 mg, oral, Daily before breakfast   Or  pantoprazole, 40 mg, intravenous, Daily before breakfast  polyethylene glycol, 17 g, oral, Daily  risperiDONE, 0.25 mg, oral, BID      Continuous medications  sodium chloride 0.9%, 100 mL/hr, Last Rate: 100 mL/hr (04/19/24 0624)      PRN medications  PRN medications: acetaminophen **OR** acetaminophen **OR** acetaminophen, busPIRone, hydrALAZINE **FOLLOWED BY** hydrALAZINE, ondansetron **OR** ondansetron, oxygen     Relevant Results  Image Results  MR brain wo IV contrast, MR angio head wo IV contrast, MR angio neck wo IV contrast  Narrative: Interpreted By:  Ketan Diaz and Shapiro Boris   STUDY:  MR BRAIN WO IV CONTRAST; MR ANGIO NECK WO IV CONTRAST; MR ANGIO HEAD  WO IV CONTRAST;  4/19/2024 9:59 pm      INDICATION:  Signs/Symptoms:stroke like symptoms.      COMPARISON:  Brain MRI 09/09/2013. CT brain dated 04/18/2024.      ACCESSION NUMBER(S):  [id number]; [id number]; [id number]      ORDERING CLINICIAN:  LAVINIA MANRIQUE      TECHNIQUE:  Axial T2, FLAIR, DWI, gradient echo T2 and sagittal and coronal T1  weighted images of brain were acquired. Noncontrast time-of-flight MR  angiogram of the [id number] vessels was obtained with MIP  reformats. Noncontrast time  of flight MR angiogram of the neck  vessels was obtained with MIP reformats.      FINDINGS:  Brain MRI:  There is no evidence of acute infarction. There are no extra-axial  collections. There is no mass lesion and no midline shift. There is  no hydrocephalus. There is generalized cerebral volume loss and  associated ventricular and sulcal enlargement slightly progressed  since 2013. Scattered periventricular and deep white matter FLAIR  hyperintensities suggestive of mild to moderate chronic microvascular  ischemic change is grossly similar since 2013. No evidence of  intracranial hemorrhage.      Paranasal Sinuses and Mastoids: Visualized paranasal sinuses are well  aerated. Trace amount of fluid in the caudal right mastoid air cells.  Left mastoid air cells are clear. The orbits are grossly normal.  Bilateral cataract surgeries.      There is a degenerative pannus in the retro odontoid region  contributing to mild central canal stenosis at the level of C1  similar to prior.      MRA of the brain:  Anterior circulation: The intracranial portions of the internal  carotid, middle cerebral, and anterior cerebral arteries are patent,  without significant focal stenosis. There is a normal anterior  communicating artery.      Posterior circulation: The intracranial portions of the right  vertebral, basilar, and proximal posterior cerebral arteries are  patent without focal stenosis. Slightly limited evaluation of the  bilateral posterior cerebral arteries. There is lack of flow  enhancement of the left vertebral artery with corresponding absent  flow void within the left vertebral artery and there is only focal  region of enhancement of the left vertebral artery near the  vertebrobasilar junction, for example axial image 5 of 125. There is  a question of intramural hematoma versus surrounding venous plexus  artifact around the left upper cervical vertebral artery, axial image  41 through 37 of 41.,          MRA of the  "neck:  Common carotid arteries: Limited evaluation.  Left internal carotid: No significant stenosis.  Right internal carotid: No significant stenosis.  Cervical vertebral arteries: The right vertebral artery is widely  patent without focal stenosis. 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      Impression: There is no evidence of acute hemorrhage, mass lesion or acute  infarction. Slight progression of generalized cerebral volume loss.      There is age indeterminate occlusion of the left vertebral artery new  since 2013 there is a question of surrounding hematoma around the  upper cervical left vertebral artery, for example axial T2 image 41  of 41. This is concerning for an age-indeterminate left vertebral  artery dissection. Further evaluation with CT angiogram of the neck  and MRA neck dissection protocol is recommended.      Signed by: Ketan Diaz 4/20/2024 8:41 AM  Dictation workstation:   [id number]        Assessment/Plan   Juanis Cornejo is a 89 y.o. female with past medical history significant for hypertension, breast cancer, CKD, anxiety/depression, OA, Alzheimer's dementia with behavioral disturbances. Presented to [facility] ED today with stroke like symptoms.     Staff at her facility noted right sided weakness and called EMS. When they arrived they did not appreciate any deficit. On presentation to ED appeared at baseline. Yelling out \"help me\"         On exam this AM she does answer minimal questions but overal does not follow commands. Her face is flushed.     ED COURSE: VSS on arrival. Labs notable for potassium of 5.4, bicarb of 19, Cr. /BUN 2.15/46, ALT/AST 85/74, HS troponin 23. VBG with lactate of 3.8. EKG SR, no ischemic changes. CXR with prominence of interstitial markings. CTH negative for acute findings.     Transient right sided weakness, r/o TIA/CVA  -MRI/MRA - concern for age indeterminate left vertebral dissection. Rec cta.        ---- discussed with neuro, rec " neurosurg and hold off on cta given creat just recovered   - cons neurosurg, apprec recs   - echo w/bubble study - reviewed, asked dr altamirano to review as no mention of bubble study in report.   -A1c, lipid panel  -asa/statin  -q4h neuro checks, monitor on tele  -neuro consult, appreciate recs -- hold on cta, talk to neurosurg. No intervention at this time.   -UA ordered-> did notify bedside nurse if needed we can straight cath for urine--- urine very dark orange/red today. Will order fluids      MARYANN on CKD  Hyperkalemia  Elevated lactate  Hypotension   -IVF - ns @100  -recheck K level to ensure accurate  -avoid nephrotoxic agents   -lactate 3.8 on VBG-> repeat 1.1   -no leukocytosis, afebrile, no tachycardia. -However soft BP's on arrival->IVF   -urine is dark during exam   - bp this am 93/64  - bladder scan -- not needed. Per nursing pt was incontinent this morning and again later around noon.   - LR 1L   - creat downtrending - 1.33 ojn 4/20        Mild transaminitis  -monitor, if uptrend consider holding statin   - downtrending on 4/20      Elevated troponin  EKG no ischemic changes  -trend trop, monitor on tele: flat     HTN  -BP on softer side, hold BP meds for now and trend   - held amlodipine, lasix and lisinopril   - bp this am 93/64     Anxiety/depression  Alzheimer's dementia with behavioral disturbances  -Resume home meds     Gi prophylaxis   - pantopraxole   - miralax      DVT prophylaxis  -heparin subcutaneous/scds     Discharge planning  -from AL facility, eval PT needs      Labs/Testing reviewed:   Interdisciplinary team rounding completed with hospitalist, nurse, TCC  NP discussed plan & lab/testing results with Dr. montague  --- pending neurosurg consult,   45 min spent on professional & overall care of this patient    Brissa Quintero, APRN-CNP

## 2024-04-20 NOTE — DISCHARGE INSTRUCTIONS
Hypotension while here in hospital -- amlodipine and lisinopril held. Iv fluids given and blood pressure normalized. Recommend holding blood pressure and trending at assisted living and will defer to facility provider or pcp on when/if to restart.   Recommend bp twice daily   Lasix also held due to acute kidney injury. Kidneys have recovered. Recommend restarting at discretion of pcp or facility provider.   Follow up with neurostroke for intracranial stenosis and started on aspirin and statin.   Check bmp and cbc next week on Tuesday

## 2024-04-20 NOTE — CARE PLAN
The patient's goals for the shift include      The clinical goals for the shift include Pt will remain free from injury and HDS      Problem: Pain - Adult  Goal: Verbalizes/displays adequate comfort level or baseline comfort level  Outcome: Progressing     Problem: Safety - Adult  Goal: Free from fall injury  Outcome: Progressing     Problem: Discharge Planning  Goal: Discharge to home or other facility with appropriate resources  Outcome: Progressing     Problem: Skin  Goal: Decreased wound size/increased tissue granulation at next dressing change  Outcome: Progressing  Goal: Participates in plan/prevention/treatment measures  Outcome: Progressing  Goal: Prevent/manage excess moisture  Outcome: Progressing  Goal: Prevent/minimize sheer/friction injuries  Outcome: Progressing  Goal: Promote/optimize nutrition  Outcome: Progressing  Goal: Promote skin healing  Outcome: Progressing     Problem: Fall/Injury  Goal: Not fall by end of shift  Outcome: Progressing  Goal: Be free from injury by end of the shift  Outcome: Progressing  Goal: Verbalize understanding of personal risk factors for fall in the hospital  Outcome: Progressing  Goal: Verbalize understanding of risk factor reduction measures to prevent injury from fall in the home  Outcome: Progressing  Goal: Use assistive devices by end of the shift  Outcome: Progressing  Goal: Pace activities to prevent fatigue by end of the shift  Outcome: Progressing     Problem: Nutrition  Goal: Less than 5 days NPO/clear liquids  Outcome: Progressing  Goal: Oral intake greater than 50%  Outcome: Progressing  Goal: Oral intake greater 75%  Outcome: Progressing  Goal: Consume prescribed supplement  Outcome: Progressing  Goal: Adequate PO fluid intake  Outcome: Progressing  Goal: Nutrition support goals are met within 48 hrs  Outcome: Progressing  Goal: Nutrition support is meeting 75% of nutrient needs  Outcome: Progressing  Goal: Tube feed tolerance  Outcome: Progressing  Goal:  BG  mg/dL  Outcome: Progressing  Goal: Lab values WNL  Outcome: Progressing  Goal: Electrolytes WNL  Outcome: Progressing  Goal: Promote healing  Outcome: Progressing  Goal: Maintain stable weight  Outcome: Progressing  Goal: Reduce weight from edema/fluid  Outcome: Progressing  Goal: Gradual weight gain  Outcome: Progressing  Goal: Improve ostomy output  Outcome: Progressing

## 2024-04-20 NOTE — DISCHARGE SUMMARY
Discharge Diagnosis  TIA (transient ischemic attack)    Discharge Meds     Your medication list        START taking these medications        Instructions Last Dose Given Next Dose Due   aspirin 81 mg EC tablet  Start taking on: April 21, 2024      Take 1 tablet (81 mg) by mouth once daily.       atorvastatin 40 mg tablet  Commonly known as: Lipitor      Take 1 tablet (40 mg) by mouth once daily at bedtime.              CONTINUE taking these medications        Instructions Last Dose Given Next Dose Due   acetaminophen 500 mg tablet  Commonly known as: Tylenol           acetaminophen 325 mg tablet  Commonly known as: Tylenol           busPIRone 10 mg tablet  Commonly known as: Buspar           docusate sodium 100 mg tablet  Commonly known as: Colace           ketoconazole 2 % shampoo  Commonly known as: NIZOral           loperamide 2 mg capsule  Commonly known as: Imodium           lubricating eye drops ophthalmic solution           magnesium hydroxide 400 mg/5 mL suspension  Commonly known as: Milk of Magnesia           mirtazapine 15 mg tablet  Commonly known as: Remeron           nystatin 100,000 unit/gram powder  Commonly known as: Mycostatin           ondansetron ODT 4 mg disintegrating tablet  Commonly known as: Zofran-ODT           potassium chloride CR 20 mEq ER tablet  Commonly known as: Klor-Con M20           risperiDONE 0.5 mg tablet  Commonly known as: RisperDAL           risperiDONE 0.5 mg tablet  Commonly known as: RisperDAL           Vitamin D3 50 MCG (2000 UT) tablet  Generic drug: cholecalciferol                  STOP taking these medications      amLODIPine 10 mg tablet  Commonly known as: Norvasc        furosemide 40 mg tablet  Commonly known as: Lasix        lisinopril 20 mg tablet                  Where to Get Your Medications        You can get these medications from any pharmacy    Bring a paper prescription for each of these medications  aspirin 81 mg EC tablet  atorvastatin 40 mg tablet    "      Test Results Pending At Discharge  Pending Labs       Order Current Status    Extra Urine Gray Tube Collected (04/19/24 2000)    Urinalysis with Reflex Culture and Microscopic In process    Urine Culture In process            Hospital Course  Juanis Cornejo is a 89 y.o. female with past medical history significant for hypertension, breast cancer, CKD, anxiety/depression, OA, Alzheimer's dementia with behavioral disturbances. Presented to [facility] ED today with stroke like symptoms.     Staff at her facility noted right sided weakness and called EMS. When they arrived they did not appreciate any deficit. On presentation to ED appeared at baseline. Yelling out \"help me\"       On exam this AM she does answer minimal questions but overal does not follow commands. Her face is flushed.     ED COURSE: VSS on arrival. Labs notable for potassium of 5.4, bicarb of 19, Cr. /BUN 2.15/46, ALT/AST 85/74, HS troponin 23. VBG with lactate of 3.8. EKG SR, no ischemic changes. CXR with prominence of interstitial markings. CTH negative for acute findings.      Pt seen and evaluated by neurology and determined no acute stroke. MR angio noted possible vertebral artery dissection and neurosurgery consutled. No acute intervention. Rec asa and statin. Fu with neurostroke outpt. Check labs tues. Hold bp meds as bp still soft. Pcp or snf doctor to restart at their discretion.     Pertinent Physical Exam At Time of Discharge  Physical Exam  Constitutional:       General: She is not in acute distress.     Comments: frail   HENT:      Head: Normocephalic and atraumatic.      Nose: Nose normal.      Mouth/Throat:      Mouth: Mucous membranes are dry.      Pharynx: Oropharynx is clear.   Cardiovascular:      Rate and Rhythm: Normal rate and regular rhythm.      Pulses: Normal pulses.      Heart sounds: Normal heart sounds.   Pulmonary:      Effort: Pulmonary effort is normal. No respiratory distress.      Breath sounds: No wheezing or rhonchi.      " Comments: Diminished bl bases  Chest:      Chest wall: No tenderness.   Abdominal:      General: Abdomen is flat. Bowel sounds are normal. There is no distension.      Palpations: Abdomen is soft.      Tenderness: There is no abdominal tenderness.   Musculoskeletal:         General: Normal range of motion.      Cervical back: Normal range of motion and neck supple.      Right lower leg: No edema.      Left lower leg: No edema.      Comments: Curled up in a ball, doesn't want to move or extend her legs. No focal weakness identified.   Skin:     General: Skin is warm and dry.      Capillary Refill: Capillary refill takes less than 2 seconds.   Neurological:      General: No focal deficit present.      Mental Status: She is alert. Mental status is at baseline. She is disoriented.   Psychiatric:         Mood and Affect: Mood normal.         Cognition and Memory: Cognition is impaired.     Outpatient Follow-Up  No future appointments.      Brissa Quintero, APRN-CNP

## 2024-04-20 NOTE — SIGNIFICANT EVENT
Brain MRI reviewed and shows negative diffusion sequence.  Mild-moderate burden of subcortical and periventricular white matter hyperintensities on FLAIR.  Advanced bilateral mesial temporal volume loss with large temporal horns compatible with dementia of the Alzheimer type.    MRAs notable for age-indeterminate occlusion of left vertebral artery new since 2013 with question of age-indeterminate dissection.    Neuroradiology suggested follow-up evaluation with CT angiogram but patient has had elevated creatinine just improved today from 2.03 to 1.33.  Discussed with primary service and I would be hesitant to recommend CT angiogram unless it is highly likely to , given potential for MARYANN, and instead recommend a neurosurgery opinion on the left vertebral artery lesion.  Additionally recommend continuing daily aspirin.

## 2024-04-20 NOTE — CARE PLAN
The clinical goals for the shift include remain free from injury and HDS    Problem: Pain - Adult  Goal: Verbalizes/displays adequate comfort level or baseline comfort level  Outcome: Progressing     Problem: Safety - Adult  Goal: Free from fall injury  Outcome: Progressing     Problem: Skin  Goal: Prevent/manage excess moisture  Outcome: Progressing  Flowsheets (Taken 4/20/2024 0684)  Prevent/manage excess moisture: Moisturize dry skin

## 2024-04-20 NOTE — CONSULTS
"Reason For Consult  Vertebral artery occlusion, possible dissection    History Of Present Illness  Juanis Cornejo is a 89 y.o. female presenting with hypertension, breast cancer, chronic kidney disease, dementia of the Alzheimer's type with behavioral disturbance, anxiety/depression, chronic kidney disease, osteoarthritis who presents after facility staff noted right sided weakness, resolved/not appreciated by providers on arrival to [facility]. Patient is unable to provide any more history.       4/19 CTH neg for hemorrhage, MRI/MRA brain and neck with left vertebral artery occlusion, questionable dissection vs hematoma of left VA     Past Medical History  She has a past medical history of Encounter for screening mammogram for malignant neoplasm of breast, Hemochromatosis, unspecified, Pain in unspecified hand, Personal history of other diseases of the circulatory system, Personal history of other diseases of the nervous system and sense organs, and Personal history of other infectious and parasitic diseases.    Surgical History  She has a past surgical history that includes Cataract extraction (06/14/2013); Colonoscopy (06/14/2013); Other surgical history (06/14/2013); Carpal tunnel release (06/14/2013); Other surgical history (06/14/2013); MR angio head wo IV contrast (9/9/2013); and MR angio neck wo IV contrast (9/9/2013).     Social History  She has no history on file for tobacco use, alcohol use, and drug use.    Family History  No family history on file.     Allergies  Patient has no allergy information on record.    Review of Systems  As above     Physical Exam  Asleep  Awakens to voice  Says her first name, doesn't correctly answer any other questions  Will follow simple commands in all 4 extremities: BUE antigravity symmetric, BLE wiggles toes symmetrically     Last Recorded Vitals  Blood pressure 93/64, pulse 56, temperature 36.3 °C (97.3 °F), temperature source Temporal, resp. rate 16, height 1.56 m (5' 1.42\"), " weight 46.5 kg (102 lb 8.2 oz), SpO2 100%.    Relevant Results  As above     Assessment/Plan     Juanis Cornejo is a 89 y.o. female presenting with hypertension, breast cancer, chronic kidney disease, dementia of the Alzheimer's type with behavioral disturbance, anxiety/depression, chronic kidney disease, osteoarthritis who presents after facility staff noted right sided weakness, resolved/not appreciated by providers on arrival to [facility].       4/19 CTH neg for hemorrhage, MRI/MRA brain and neck with left vertebral artery occlusion, questionable dissection vs hematoma of left VA      Recommendations  Agree with deferring CTA at this point due to MARYANN. Can continue ASA 81 if neurology recommends  No additional imaging or intervention per neurosurgery  Recommend follow-up and management with stroke neurology   Plan was discussed with attending. Recommendations not final until note signed by attending.          Lobo Chapman MD

## 2024-04-21 LAB — BACTERIA UR CULT: NORMAL

## 2024-05-05 NOTE — ED PROCEDURE NOTE
Procedure  Critical Care    Performed by: Armani Chino MD  Authorized by: Armani Chino MD    Critical care provider statement:     Critical care time (minutes):  45    Critical care time was exclusive of:  Separately billable procedures and treating other patients    Critical care was necessary to treat or prevent imminent or life-threatening deterioration of the following conditions:  CNS failure or compromise    Critical care was time spent personally by me on the following activities:  Review of old charts, re-evaluation of patient's condition, pulse oximetry, ordering and review of radiographic studies, ordering and review of laboratory studies, ordering and performing treatments and interventions, examination of patient, evaluation of patient's response to treatment, development of treatment plan with patient or surrogate and blood draw for specimens    Care discussed with: admitting provider                 Armani Chino MD  05/05/24 [id number]

## 2024-05-07 ENCOUNTER — HOSPITAL ENCOUNTER (INPATIENT)
Facility: HOSPITAL | Age: 89
LOS: 8 days | Discharge: HOME | DRG: 444 | End: 2024-05-16
Attending: EMERGENCY MEDICINE | Admitting: FAMILY MEDICINE
Payer: MEDICARE

## 2024-05-07 ENCOUNTER — APPOINTMENT (OUTPATIENT)
Dept: RADIOLOGY | Facility: HOSPITAL | Age: 89
DRG: 444 | End: 2024-05-07
Payer: MEDICARE

## 2024-05-07 ENCOUNTER — APPOINTMENT (OUTPATIENT)
Dept: CARDIOLOGY | Facility: HOSPITAL | Age: 89
DRG: 444 | End: 2024-05-07
Payer: MEDICARE

## 2024-05-07 DIAGNOSIS — R10.13 EPIGASTRIC PAIN: Primary | ICD-10-CM

## 2024-05-07 DIAGNOSIS — F32.9 REACTIVE DEPRESSION: ICD-10-CM

## 2024-05-07 DIAGNOSIS — K81.0 ACUTE CHOLECYSTITIS: ICD-10-CM

## 2024-05-07 DIAGNOSIS — F22 DELUSIONAL DISORDER (MULTI): ICD-10-CM

## 2024-05-07 DIAGNOSIS — R13.12 OROPHARYNGEAL DYSPHAGIA: ICD-10-CM

## 2024-05-07 DIAGNOSIS — I10 PRIMARY HYPERTENSION: Chronic | ICD-10-CM

## 2024-05-07 DIAGNOSIS — K81.9 CHOLECYSTITIS: ICD-10-CM

## 2024-05-07 PROBLEM — N18.31 STAGE 3A CHRONIC KIDNEY DISEASE (MULTI): Status: ACTIVE | Noted: 2024-05-07

## 2024-05-07 PROBLEM — G30.9 ALZHEIMER'S DEMENTIA (MULTI): Chronic | Status: ACTIVE | Noted: 2024-05-07

## 2024-05-07 PROBLEM — N18.31 STAGE 3A CHRONIC KIDNEY DISEASE (MULTI): Chronic | Status: ACTIVE | Noted: 2024-05-07

## 2024-05-07 PROBLEM — F02.80 ALZHEIMER'S DEMENTIA (MULTI): Chronic | Status: ACTIVE | Noted: 2024-05-07

## 2024-05-07 PROBLEM — G30.1 LATE ONSET ALZHEIMER DEMENTIA (MULTI): Chronic | Status: ACTIVE | Noted: 2024-05-07

## 2024-05-07 PROBLEM — F03.90 DEMENTIA (MULTI): Chronic | Status: ACTIVE | Noted: 2024-05-07

## 2024-05-07 PROBLEM — F32.A DEPRESSION: Status: ACTIVE | Noted: 2024-05-07

## 2024-05-07 PROBLEM — R10.11 RUQ ABDOMINAL PAIN: Status: ACTIVE | Noted: 2024-05-07

## 2024-05-07 PROBLEM — G45.9 TIA (TRANSIENT ISCHEMIC ATTACK): Status: RESOLVED | Noted: 2024-04-18 | Resolved: 2024-05-07

## 2024-05-07 PROBLEM — E55.9 VITAMIN D DEFICIENCY: Status: ACTIVE | Noted: 2024-05-07

## 2024-05-07 PROBLEM — F41.9 ANXIETY: Status: ACTIVE | Noted: 2024-05-07

## 2024-05-07 PROBLEM — R00.1 SINUS BRADYCARDIA: Status: ACTIVE | Noted: 2024-05-07

## 2024-05-07 PROBLEM — F03.90 DEMENTIA (MULTI): Status: ACTIVE | Noted: 2024-05-07

## 2024-05-07 LAB
ALBUMIN SERPL BCP-MCNC: 3.8 G/DL (ref 3.4–5)
ALP SERPL-CCNC: 106 U/L (ref 33–136)
ALT SERPL W P-5'-P-CCNC: 38 U/L (ref 7–45)
ANION GAP SERPL CALC-SCNC: 12 MMOL/L (ref 10–20)
ANION GAP SERPL CALC-SCNC: 14 MMOL/L (ref 10–20)
AST SERPL W P-5'-P-CCNC: 59 U/L (ref 9–39)
BASOPHILS # BLD AUTO: 0.02 X10*3/UL (ref 0–0.1)
BASOPHILS NFR BLD AUTO: 0.1 %
BILIRUB SERPL-MCNC: 0.9 MG/DL (ref 0–1.2)
BUN SERPL-MCNC: 16 MG/DL (ref 6–23)
BUN SERPL-MCNC: 18 MG/DL (ref 6–23)
CALCIUM SERPL-MCNC: 8.1 MG/DL (ref 8.6–10.3)
CALCIUM SERPL-MCNC: 8.7 MG/DL (ref 8.6–10.3)
CHLORIDE SERPL-SCNC: 105 MMOL/L (ref 98–107)
CHLORIDE SERPL-SCNC: 106 MMOL/L (ref 98–107)
CO2 SERPL-SCNC: 23 MMOL/L (ref 21–32)
CO2 SERPL-SCNC: 24 MMOL/L (ref 21–32)
CREAT SERPL-MCNC: 1.1 MG/DL (ref 0.5–1.05)
CREAT SERPL-MCNC: 1.15 MG/DL (ref 0.5–1.05)
EGFRCR SERPLBLD CKD-EPI 2021: 44 ML/MIN/1.73M*2
EGFRCR SERPLBLD CKD-EPI 2021: 46 ML/MIN/1.73M*2
EOSINOPHIL # BLD AUTO: 0 X10*3/UL (ref 0–0.4)
EOSINOPHIL NFR BLD AUTO: 0 %
ERYTHROCYTE [DISTWIDTH] IN BLOOD BY AUTOMATED COUNT: 14.6 % (ref 11.5–14.5)
GLUCOSE SERPL-MCNC: 113 MG/DL (ref 74–99)
GLUCOSE SERPL-MCNC: 128 MG/DL (ref 74–99)
HCT VFR BLD AUTO: 40.6 % (ref 36–46)
HGB BLD-MCNC: 13.2 G/DL (ref 12–16)
IMM GRANULOCYTES # BLD AUTO: 0.08 X10*3/UL (ref 0–0.5)
IMM GRANULOCYTES NFR BLD AUTO: 0.5 % (ref 0–0.9)
LIPASE SERPL-CCNC: 12 U/L (ref 9–82)
LYMPHOCYTES # BLD AUTO: 0.41 X10*3/UL (ref 0.8–3)
LYMPHOCYTES NFR BLD AUTO: 2.6 %
MCH RBC QN AUTO: 32.9 PG (ref 26–34)
MCHC RBC AUTO-ENTMCNC: 32.5 G/DL (ref 32–36)
MCV RBC AUTO: 101 FL (ref 80–100)
MONOCYTES # BLD AUTO: 1.18 X10*3/UL (ref 0.05–0.8)
MONOCYTES NFR BLD AUTO: 7.6 %
NEUTROPHILS # BLD AUTO: 13.82 X10*3/UL (ref 1.6–5.5)
NEUTROPHILS NFR BLD AUTO: 89.2 %
NRBC BLD-RTO: 0 /100 WBCS (ref 0–0)
PLATELET # BLD AUTO: 218 X10*3/UL (ref 150–450)
POTASSIUM SERPL-SCNC: 4.6 MMOL/L (ref 3.5–5.3)
POTASSIUM SERPL-SCNC: 5.5 MMOL/L (ref 3.5–5.3)
PROT SERPL-MCNC: 6.3 G/DL (ref 6.4–8.2)
RBC # BLD AUTO: 4.01 X10*6/UL (ref 4–5.2)
SODIUM SERPL-SCNC: 136 MMOL/L (ref 136–145)
SODIUM SERPL-SCNC: 137 MMOL/L (ref 136–145)
WBC # BLD AUTO: 15.5 X10*3/UL (ref 4.4–11.3)

## 2024-05-07 PROCEDURE — 36415 COLL VENOUS BLD VENIPUNCTURE: CPT | Performed by: EMERGENCY MEDICINE

## 2024-05-07 PROCEDURE — 74176 CT ABD & PELVIS W/O CONTRAST: CPT | Performed by: RADIOLOGY

## 2024-05-07 PROCEDURE — 2500000005 HC RX 250 GENERAL PHARMACY W/O HCPCS: Performed by: NURSE PRACTITIONER

## 2024-05-07 PROCEDURE — 76705 ECHO EXAM OF ABDOMEN: CPT

## 2024-05-07 PROCEDURE — 2500000004 HC RX 250 GENERAL PHARMACY W/ HCPCS (ALT 636 FOR OP/ED): Performed by: NURSE PRACTITIONER

## 2024-05-07 PROCEDURE — 2500000004 HC RX 250 GENERAL PHARMACY W/ HCPCS (ALT 636 FOR OP/ED): Performed by: EMERGENCY MEDICINE

## 2024-05-07 PROCEDURE — 80053 COMPREHEN METABOLIC PANEL: CPT | Performed by: EMERGENCY MEDICINE

## 2024-05-07 PROCEDURE — 80048 BASIC METABOLIC PNL TOTAL CA: CPT | Mod: CCI | Performed by: NURSE PRACTITIONER

## 2024-05-07 PROCEDURE — 76705 ECHO EXAM OF ABDOMEN: CPT | Performed by: RADIOLOGY

## 2024-05-07 PROCEDURE — 96365 THER/PROPH/DIAG IV INF INIT: CPT

## 2024-05-07 PROCEDURE — 2500000001 HC RX 250 WO HCPCS SELF ADMINISTERED DRUGS (ALT 637 FOR MEDICARE OP): Performed by: NURSE PRACTITIONER

## 2024-05-07 PROCEDURE — 93005 ELECTROCARDIOGRAM TRACING: CPT

## 2024-05-07 PROCEDURE — G0378 HOSPITAL OBSERVATION PER HR: HCPCS

## 2024-05-07 PROCEDURE — 99285 EMERGENCY DEPT VISIT HI MDM: CPT | Mod: 25

## 2024-05-07 PROCEDURE — 87040 BLOOD CULTURE FOR BACTERIA: CPT | Mod: AHULAB | Performed by: EMERGENCY MEDICINE

## 2024-05-07 PROCEDURE — 83690 ASSAY OF LIPASE: CPT | Performed by: EMERGENCY MEDICINE

## 2024-05-07 PROCEDURE — 85025 COMPLETE CBC W/AUTO DIFF WBC: CPT | Performed by: EMERGENCY MEDICINE

## 2024-05-07 PROCEDURE — 74176 CT ABD & PELVIS W/O CONTRAST: CPT

## 2024-05-07 RX ORDER — RISPERIDONE 0.25 MG/1
0.25 TABLET ORAL 2 TIMES DAILY
Status: DISCONTINUED | OUTPATIENT
Start: 2024-05-07 | End: 2024-05-16 | Stop reason: HOSPADM

## 2024-05-07 RX ORDER — ONDANSETRON HYDROCHLORIDE 2 MG/ML
4 INJECTION, SOLUTION INTRAVENOUS ONCE
Status: DISCONTINUED | OUTPATIENT
Start: 2024-05-07 | End: 2024-05-07

## 2024-05-07 RX ORDER — ACETAMINOPHEN 325 MG/1
650 TABLET ORAL EVERY 6 HOURS PRN
Status: DISCONTINUED | OUTPATIENT
Start: 2024-05-07 | End: 2024-05-16 | Stop reason: HOSPADM

## 2024-05-07 RX ORDER — ENOXAPARIN SODIUM 100 MG/ML
40 INJECTION SUBCUTANEOUS EVERY 24 HOURS
Status: DISCONTINUED | OUTPATIENT
Start: 2024-05-07 | End: 2024-05-16 | Stop reason: HOSPADM

## 2024-05-07 RX ORDER — LISINOPRIL 20 MG/1
20 TABLET ORAL DAILY
Status: DISCONTINUED | OUTPATIENT
Start: 2024-05-08 | End: 2024-05-16 | Stop reason: HOSPADM

## 2024-05-07 RX ORDER — ONDANSETRON 4 MG/1
8 TABLET, ORALLY DISINTEGRATING ORAL ONCE
Status: DISCONTINUED | OUTPATIENT
Start: 2024-05-07 | End: 2024-05-07

## 2024-05-07 RX ORDER — FUROSEMIDE 40 MG/1
40 TABLET ORAL DAILY
Status: DISCONTINUED | OUTPATIENT
Start: 2024-05-07 | End: 2024-05-15 | Stop reason: SDUPTHER

## 2024-05-07 RX ORDER — AMLODIPINE BESYLATE 10 MG/1
10 TABLET ORAL EVERY MORNING
Status: DISCONTINUED | OUTPATIENT
Start: 2024-05-08 | End: 2024-05-15

## 2024-05-07 RX ORDER — ONDANSETRON HYDROCHLORIDE 2 MG/ML
4 INJECTION, SOLUTION INTRAVENOUS EVERY 8 HOURS PRN
Status: DISCONTINUED | OUTPATIENT
Start: 2024-05-07 | End: 2024-05-16 | Stop reason: HOSPADM

## 2024-05-07 RX ORDER — POLYVINYL ALCOHOL, POVIDONE 14; 6 MG/ML; MG/ML
2 SOLUTION/ DROPS OPHTHALMIC 2 TIMES DAILY
COMMUNITY

## 2024-05-07 RX ORDER — ATORVASTATIN CALCIUM 40 MG/1
40 TABLET, FILM COATED ORAL NIGHTLY
Status: DISCONTINUED | OUTPATIENT
Start: 2024-05-07 | End: 2024-05-16 | Stop reason: HOSPADM

## 2024-05-07 RX ORDER — ASPIRIN 81 MG/1
81 TABLET ORAL DAILY
Status: DISCONTINUED | OUTPATIENT
Start: 2024-05-07 | End: 2024-05-16 | Stop reason: HOSPADM

## 2024-05-07 RX ORDER — FUROSEMIDE 40 MG/1
40 TABLET ORAL DAILY
COMMUNITY
End: 2024-05-16 | Stop reason: HOSPADM

## 2024-05-07 RX ORDER — LISINOPRIL 20 MG/1
20 TABLET ORAL DAILY
COMMUNITY

## 2024-05-07 RX ORDER — PANTOPRAZOLE SODIUM 40 MG/1
40 TABLET, DELAYED RELEASE ORAL
Status: DISCONTINUED | OUTPATIENT
Start: 2024-05-08 | End: 2024-05-16 | Stop reason: HOSPADM

## 2024-05-07 RX ORDER — ACETAMINOPHEN 325 MG/1
650 TABLET ORAL EVERY 6 HOURS PRN
Status: DISCONTINUED | OUTPATIENT
Start: 2024-05-07 | End: 2024-05-07

## 2024-05-07 RX ORDER — AMLODIPINE BESYLATE 10 MG/1
10 TABLET ORAL EVERY MORNING
Status: ON HOLD | COMMUNITY
End: 2024-05-16

## 2024-05-07 RX ORDER — MIRTAZAPINE 15 MG/1
15 TABLET, FILM COATED ORAL EVERY EVENING
Status: DISCONTINUED | OUTPATIENT
Start: 2024-05-07 | End: 2024-05-13

## 2024-05-07 RX ORDER — TALC
3 POWDER (GRAM) TOPICAL NIGHTLY PRN
Status: DISCONTINUED | OUTPATIENT
Start: 2024-05-07 | End: 2024-05-16 | Stop reason: HOSPADM

## 2024-05-07 RX ORDER — PANTOPRAZOLE SODIUM 40 MG/10ML
40 INJECTION, POWDER, LYOPHILIZED, FOR SOLUTION INTRAVENOUS
Status: DISCONTINUED | OUTPATIENT
Start: 2024-05-08 | End: 2024-05-16 | Stop reason: HOSPADM

## 2024-05-07 RX ORDER — RISPERIDONE 0.25 MG/1
0.25 TABLET ORAL DAILY PRN
Status: DISCONTINUED | OUTPATIENT
Start: 2024-05-07 | End: 2024-05-16 | Stop reason: HOSPADM

## 2024-05-07 RX ORDER — BUSPIRONE HYDROCHLORIDE 10 MG/1
10 TABLET ORAL 3 TIMES DAILY
Status: DISCONTINUED | OUTPATIENT
Start: 2024-05-07 | End: 2024-05-16 | Stop reason: HOSPADM

## 2024-05-07 RX ORDER — DOCUSATE SODIUM 100 MG/1
100 CAPSULE, LIQUID FILLED ORAL 2 TIMES DAILY
Status: DISCONTINUED | OUTPATIENT
Start: 2024-05-07 | End: 2024-05-16 | Stop reason: HOSPADM

## 2024-05-07 RX ORDER — POLYETHYLENE GLYCOL 3350 17 G/17G
17 POWDER, FOR SOLUTION ORAL DAILY PRN
Status: DISCONTINUED | OUTPATIENT
Start: 2024-05-07 | End: 2024-05-16 | Stop reason: HOSPADM

## 2024-05-07 RX ORDER — SODIUM CHLORIDE 9 MG/ML
75 INJECTION, SOLUTION INTRAVENOUS CONTINUOUS
Status: DISCONTINUED | OUTPATIENT
Start: 2024-05-07 | End: 2024-05-08

## 2024-05-07 RX ADMIN — ACETAMINOPHEN 650 MG: 325 TABLET ORAL at 22:13

## 2024-05-07 RX ADMIN — MIRTAZAPINE 15 MG: 15 TABLET, FILM COATED ORAL at 22:13

## 2024-05-07 RX ADMIN — RISPERIDONE 0.25 MG: 0.25 TABLET, FILM COATED ORAL at 22:13

## 2024-05-07 RX ADMIN — ATORVASTATIN CALCIUM 40 MG: 40 TABLET, FILM COATED ORAL at 22:13

## 2024-05-07 RX ADMIN — Medication 3 MG: at 22:13

## 2024-05-07 RX ADMIN — SODIUM CHLORIDE 75 ML/HR: 9 INJECTION, SOLUTION INTRAVENOUS at 22:28

## 2024-05-07 RX ADMIN — CARBOXYMETHYLCELLULOSE SODIUM 2 DROP: 0.5 SOLUTION/ DROPS OPHTHALMIC at 22:13

## 2024-05-07 RX ADMIN — DOCUSATE SODIUM 100 MG: 100 CAPSULE, LIQUID FILLED ORAL at 22:13

## 2024-05-07 RX ADMIN — PIPERACILLIN SODIUM AND TAZOBACTAM SODIUM 3.38 G: 3; .375 INJECTION, SOLUTION INTRAVENOUS at 18:13

## 2024-05-07 RX ADMIN — BUSPIRONE HYDROCHLORIDE 10 MG: 10 TABLET ORAL at 22:13

## 2024-05-07 SDOH — SOCIAL STABILITY: SOCIAL INSECURITY: DO YOU FEEL UNSAFE GOING BACK TO THE PLACE WHERE YOU ARE LIVING?: UNABLE TO ASSESS

## 2024-05-07 SDOH — SOCIAL STABILITY: SOCIAL INSECURITY: WERE YOU ABLE TO COMPLETE ALL THE BEHAVIORAL HEALTH SCREENINGS?: YES

## 2024-05-07 SDOH — SOCIAL STABILITY: SOCIAL INSECURITY: DOES ANYONE TRY TO KEEP YOU FROM HAVING/CONTACTING OTHER FRIENDS OR DOING THINGS OUTSIDE YOUR HOME?: UNABLE TO ASSESS

## 2024-05-07 SDOH — SOCIAL STABILITY: SOCIAL INSECURITY: ARE YOU OR HAVE YOU BEEN THREATENED OR ABUSED PHYSICALLY, EMOTIONALLY, OR SEXUALLY BY ANYONE?: UNABLE TO ASSESS

## 2024-05-07 SDOH — SOCIAL STABILITY: SOCIAL INSECURITY: ARE THERE ANY APPARENT SIGNS OF INJURIES/BEHAVIORS THAT COULD BE RELATED TO ABUSE/NEGLECT?: NO

## 2024-05-07 SDOH — SOCIAL STABILITY: SOCIAL INSECURITY: HAVE YOU HAD THOUGHTS OF HARMING ANYONE ELSE?: UNABLE TO ASSESS

## 2024-05-07 SDOH — SOCIAL STABILITY: SOCIAL INSECURITY: HAS ANYONE EVER THREATENED TO HURT YOUR FAMILY OR YOUR PETS?: UNABLE TO ASSESS

## 2024-05-07 SDOH — SOCIAL STABILITY: SOCIAL INSECURITY: ABUSE: ADULT

## 2024-05-07 SDOH — SOCIAL STABILITY: SOCIAL INSECURITY: DO YOU FEEL ANYONE HAS EXPLOITED OR TAKEN ADVANTAGE OF YOU FINANCIALLY OR OF YOUR PERSONAL PROPERTY?: UNABLE TO ASSESS

## 2024-05-07 ASSESSMENT — COLUMBIA-SUICIDE SEVERITY RATING SCALE - C-SSRS
2. HAVE YOU ACTUALLY HAD ANY THOUGHTS OF KILLING YOURSELF?: NO
1. IN THE PAST MONTH, HAVE YOU WISHED YOU WERE DEAD OR WISHED YOU COULD GO TO SLEEP AND NOT WAKE UP?: NO
6. HAVE YOU EVER DONE ANYTHING, STARTED TO DO ANYTHING, OR PREPARED TO DO ANYTHING TO END YOUR LIFE?: NO

## 2024-05-07 ASSESSMENT — LIFESTYLE VARIABLES
HOW OFTEN DO YOU HAVE A DRINK CONTAINING ALCOHOL: PATIENT UNABLE TO ANSWER
HOW MANY STANDARD DRINKS CONTAINING ALCOHOL DO YOU HAVE ON A TYPICAL DAY: PATIENT UNABLE TO ANSWER
AUDIT-C TOTAL SCORE: -1
HOW OFTEN DO YOU HAVE 6 OR MORE DRINKS ON ONE OCCASION: PATIENT UNABLE TO ANSWER
SKIP TO QUESTIONS 9-10: 0
AUDIT-C TOTAL SCORE: -1

## 2024-05-07 ASSESSMENT — PATIENT HEALTH QUESTIONNAIRE - PHQ9
2. FEELING DOWN, DEPRESSED OR HOPELESS: NOT AT ALL
1. LITTLE INTEREST OR PLEASURE IN DOING THINGS: NOT AT ALL
SUM OF ALL RESPONSES TO PHQ9 QUESTIONS 1 & 2: 0

## 2024-05-07 ASSESSMENT — PAIN SCALES - GENERAL
PAINLEVEL_OUTOF10: 2
PAINLEVEL_OUTOF10: 0 - NO PAIN

## 2024-05-07 ASSESSMENT — ACTIVITIES OF DAILY LIVING (ADL)
DRESSING YOURSELF: DEPENDENT
WALKS IN HOME: NEEDS ASSISTANCE
JUDGMENT_ADEQUATE_SAFELY_COMPLETE_DAILY_ACTIVITIES: NO
HEARING - RIGHT EAR: FUNCTIONAL
HEARING - LEFT EAR: FUNCTIONAL
TOILETING: DEPENDENT
LACK_OF_TRANSPORTATION: PATIENT UNABLE TO ANSWER
ADEQUATE_TO_COMPLETE_ADL: NO
GROOMING: DEPENDENT
FEEDING YOURSELF: DEPENDENT
BATHING: DEPENDENT
PATIENT'S MEMORY ADEQUATE TO SAFELY COMPLETE DAILY ACTIVITIES?: NO

## 2024-05-07 ASSESSMENT — COGNITIVE AND FUNCTIONAL STATUS - GENERAL: PATIENT BASELINE BEDBOUND: YES

## 2024-05-07 ASSESSMENT — PAIN - FUNCTIONAL ASSESSMENT: PAIN_FUNCTIONAL_ASSESSMENT: 0-10

## 2024-05-07 NOTE — H&P
[facility] OBSERVATION UNIT  H&P    Juanis Cornejo is a 89 y.o. female, with a PMH of HTN, CKD, Alzheimer's dementia with behavioral disturbances, and TIA who is being admitted under observation status at [facility] on 5/7/2024 for RUQ pain and concern for acute cholecystitis.     Patient intitally presented to the ED on 5/7/2024 for n/v, associated with RUQ abd pain and distention.     In the ED, VSS, EKG WNL. Labs notable for leukocytosis 15.5, K+ 5.5 [hemolyzed], renal fxn at baseline, mildly elevated AST but otherwise normal liver fxn, and no anemia. CT abd/pel revealed mesenteric adenitis and findings concerning for possible cholecystitis. RUQ US showed findings concerning for possible cholecystitis. Patient was given antiemetics, IV Zosyn, and admitted for further evaluation. Surgical consult and recommendations are pending. Blood cx were drawn prior to abx.    Subjective   Past Medical History:   Diagnosis Date    Alzheimer's dementia (Multi) 05/07/2024    Anxiety 05/07/2024    Delusional disorder (Multi) 05/07/2024    Hemochromatosis, unspecified     Hypertension 05/07/2024    Stage 3a chronic kidney disease (Multi) 05/07/2024    TIA (transient ischemic attack) 04/18/2024     Past Surgical History:   Procedure Laterality Date    CARPAL TUNNEL RELEASE  06/14/2013    Neuroplasty Decompression Median Nerve At Carpal Tunnel    CATARACT EXTRACTION  06/14/2013    Cataract Surgery    COLONOSCOPY  06/14/2013    Complete Colonoscopy    MR HEAD ANGIO WO IV CONTRAST  9/9/2013    MR HEAD ANGIO WO IV CONTRAST 9/9/2013 [facility]GENCY [facility]    MR NECK ANGIO WO IV CONTRAST  9/9/2013    MR NECK ANGIO WO IV CONTRAST 9/9/2013 [facility] EMERGENCY LEGACY    OTHER SURGICAL HISTORY  06/14/2013    Phlebotomy (Therapeutic)    OTHER SURGICAL HISTORY  06/14/2013    Stress Test ECG Performed     Family History:   No family history on file.    Allergies:  No Known Allergies    Prior to Admission  medications    Medication Sig Start Date End Date Taking? Authorizing Provider   acetaminophen (Tylenol) 325 mg tablet Take 2 tablets (650 mg) by mouth every 4 hours if needed for mild pain (1 - 3).    Historical Provider, MD   acetaminophen (Tylenol) 500 mg tablet Take 2 tablets (1,000 mg) by mouth 2 times a day.    Historical Provider, MD   amLODIPine (Norvasc) 10 mg tablet Take 1 tablet (10 mg) by mouth once daily in the morning.    Historical Provider, MD   aspirin 81 mg EC tablet Take 1 tablet (81 mg) by mouth once daily. 4/21/24 5/21/24  SHEILA Huerta   atorvastatin (Lipitor) 40 mg tablet Take 1 tablet (40 mg) by mouth once daily at bedtime. 4/20/24 5/20/24  CASH Huerta-CNP   busPIRone (Buspar) 10 mg tablet Take 1 tablet (10 mg) by mouth 3 times a day.    Historical Provider, MD   cholecalciferol (Vitamin D3) 50 MCG (2000 UT) tablet Take 1 tablet (50 mcg) by mouth once daily.    Historical Provider, MD   docusate sodium (Colace) 100 mg tablet Take 1 tablet (100 mg) by mouth 2 times a day.    Historical Provider, MD   furosemide (Lasix) 40 mg tablet Take 1 tablet (40 mg) by mouth once daily.    Historical Provider, MD   ketoconazole (NIZOral) 2 % shampoo Apply topically. Use as directed every 4 days    Historical Provider, MD   lisinopril 20 mg tablet Take 1 tablet (20 mg) by mouth once daily.    Historical Provider, MD   loperamide (Imodium) 2 mg capsule Take 1 capsule (2 mg) by mouth if needed for diarrhea.    Historical Provider, MD   lubricating eye drops (Refresh Classic, PF,) ophthalmic solution Administer 2 drops into both eyes 2 times a day.    Historical Provider, MD   magnesium hydroxide (Milk of Magnesia) 400 mg/5 mL suspension Take 30 mL by mouth once daily as needed for constipation.    Historical Provider, MD   mirtazapine (Remeron) 15 mg tablet Take 1 tablet (15 mg) by mouth once daily in the evening.    Historical Provider, MD   nystatin (Mycostatin) 100,000 unit/gram  "powder Apply 1 Application topically 2 times a day. To groin (under breast three times a day as needed after cleaning and drying)    Historical Provider, MD   ondansetron ODT (Zofran-ODT) 4 mg disintegrating tablet Take 1 tablet (4 mg) by mouth every 6 hours if needed for nausea or vomiting.    Historical Provider, MD   potassium chloride CR 20 mEq ER tablet Take 2 tablets (40 mEq) by mouth once daily. Do not crush or chew.    Historical Provider, MD   risperiDONE (RisperDAL) 0.5 mg tablet Take 1 tablet (0.5 mg) by mouth 2 times a day.    Historical Provider, MD   risperiDONE (RisperDAL) 0.5 mg tablet Take 0.5 tablets (0.25 mg) by mouth 2 times a day. AND AS NEEDED    Historical Provider, MD   lubricating eye drops ophthalmic solution Administer 2 drops into both eyes 2 times a day.  5/7/24  Historical Provider, MD     Review of Systems   Unable to perform ROS: Dementia        Objective   Heart Rate:  [65-89]   Temp:  [36.2 °C (97.2 °F)]   Resp:  [13-38]   BP: (114-153)/(41-88)   SpO2:  [91 %-98 %]      Pain Score: 0 - No pain   There were no vitals filed for this visit.     Intake/Output Summary (Last 24 hours) at 5/7/2024 2119  Last data filed at 5/7/2024 1843  Gross per 24 hour   Intake 50 ml   Output --   Net 50 ml         /51   Pulse 76   Temp 36.9 °C (98.4 °F) (Temporal)   Resp 20   Ht 1.473 m (4' 10\")   Wt 54.2 kg (119 lb 7.8 oz)   LMP  (LMP Unknown)   SpO2 95%   BMI 24.97 kg/m²   PHYSICAL EXAM:  GENERAL: Alert, mumbled speech, NAD  HEENT:  NCAT, PERRLA, EOMI, nonicteric sclera, MMM, neck supple, trachea midline  LUNGS: Unlabored, no significant wheezing, rhonchi, or rales appreciated  CARDS: RRR, no m/g/r appreciated  GI: Soft, +TTP RUQ, no peritoneal signs, negative Grimes's sign, +BS  : purewick in place   MS/Extremities: WWP, no significant peripheral edema, distal pulses intact, moves all extremities  NEURO: A&Ox1, pleasantly confused,     Medications  [START ON 5/8/2024] amLODIPine, 10 " mg, oral, q AM  aspirin, 81 mg, oral, Daily  atorvastatin, 40 mg, oral, Nightly  busPIRone, 10 mg, oral, TID  docusate sodium, 100 mg, oral, BID  [Held by provider] enoxaparin, 40 mg, subcutaneous, q24h  [Held by provider] furosemide, 40 mg, oral, Daily  iohexol, 85 mL, intravenous, Once in imaging  [START ON 5/8/2024] lisinopril, 20 mg, oral, Daily  lubricating eye drops, 2 drop, Both Eyes, BID  mirtazapine, 15 mg, oral, q PM  [START ON 5/8/2024] pantoprazole, 40 mg, oral, Daily before breakfast   Or  [START ON 5/8/2024] pantoprazole, 40 mg, intravenous, Daily before breakfast  [START ON 5/8/2024] piperacillin-tazobactam, 3.375 g, intravenous, q6h  risperiDONE, 0.25 mg, oral, BID    sodium chloride 0.9%, 75 mL/hr    PRN medications: acetaminophen, melatonin, ondansetron, polyethylene glycol, risperiDONE    Labs  Results for orders placed or performed during the hospital encounter of 05/07/24 (from the past 24 hour(s))   ECG 12 lead   Result Value Ref Range    Ventricular Rate 70 BPM    Atrial Rate 70 BPM    VT Interval 206 ms    QRS Duration 102 ms    QT Interval 400 ms    QTC Calculation(Bazett) 432 ms    R Axis 116 degrees    T Axis 53 degrees    QRS Count 12 beats    Q Onset 222 ms    P Onset 119 ms    P Offset 167 ms    T Offset 422 ms    QTC Fredericia 421 ms   CBC and Auto Differential   Result Value Ref Range    WBC 15.5 (H) 4.4 - 11.3 x10*3/uL    nRBC 0.0 0.0 - 0.0 /100 WBCs    RBC 4.01 4.00 - 5.20 x10*6/uL    Hemoglobin 13.2 12.0 - 16.0 g/dL    Hematocrit 40.6 36.0 - 46.0 %     (H) 80 - 100 fL    MCH 32.9 26.0 - 34.0 pg    MCHC 32.5 32.0 - 36.0 g/dL    RDW 14.6 (H) 11.5 - 14.5 %    Platelets 218 150 - 450 x10*3/uL    Neutrophils % 89.2 40.0 - 80.0 %    Immature Granulocytes %, Automated 0.5 0.0 - 0.9 %    Lymphocytes % 2.6 13.0 - 44.0 %    Monocytes % 7.6 2.0 - 10.0 %    Eosinophils % 0.0 0.0 - 6.0 %    Basophils % 0.1 0.0 - 2.0 %    Neutrophils Absolute 13.82 (H) 1.60 - 5.50 x10*3/uL    Immature  Granulocytes Absolute, Automated 0.08 0.00 - 0.50 x10*3/uL    Lymphocytes Absolute 0.41 (L) 0.80 - 3.00 x10*3/uL    Monocytes Absolute 1.18 (H) 0.05 - 0.80 x10*3/uL    Eosinophils Absolute 0.00 0.00 - 0.40 x10*3/uL    Basophils Absolute 0.02 0.00 - 0.10 x10*3/uL   Comprehensive metabolic panel   Result Value Ref Range    Glucose 113 (H) 74 - 99 mg/dL    Sodium 136 136 - 145 mmol/L    Potassium 5.5 (H) 3.5 - 5.3 mmol/L    Chloride 105 98 - 107 mmol/L    Bicarbonate 23 21 - 32 mmol/L    Anion Gap 14 10 - 20 mmol/L    Urea Nitrogen 18 6 - 23 mg/dL    Creatinine 1.10 (H) 0.50 - 1.05 mg/dL    eGFR 46 (L) >60 mL/min/1.73m*2    Calcium 8.7 8.6 - 10.3 mg/dL    Albumin 3.8 3.4 - 5.0 g/dL    Alkaline Phosphatase 106 33 - 136 U/L    Total Protein 6.3 (L) 6.4 - 8.2 g/dL    AST 59 (H) 9 - 39 U/L    Bilirubin, Total 0.9 0.0 - 1.2 mg/dL    ALT 38 7 - 45 U/L   Lipase   Result Value Ref Range    Lipase 12 9 - 82 U/L   Basic metabolic panel   Result Value Ref Range    Glucose 128 (H) 74 - 99 mg/dL    Sodium 137 136 - 145 mmol/L    Potassium 4.6 3.5 - 5.3 mmol/L    Chloride 106 98 - 107 mmol/L    Bicarbonate 24 21 - 32 mmol/L    Anion Gap 12 10 - 20 mmol/L    Urea Nitrogen 16 6 - 23 mg/dL    Creatinine 1.15 (H) 0.50 - 1.05 mg/dL    eGFR 44 (L) >60 mL/min/1.73m*2    Calcium 8.1 (L) 8.6 - 10.3 mg/dL     Imaging  CT abdomen pelvis wo IV contrast 05/07/2024  Small nonspecific bilateral pleural effusions with associated posterior bibasilar lung atelectasis.    Punctate nonobstructing stone in the upper pole of the left kidney.    Scattered colonic diverticulosis without acute associated inflammation.    Nonspecific mesenteric adenopathy with associated fat stranding medial to the cecum and right colon. Question mesenteric adenitis.    Nonspecific gallbladder wall thickening with mild adjacent edema but without calcified stone. Cannot exclude noncalcified gallstones. Recommend further evaluation with gallbladder ultrasound to exclude acute  cholecystitis.    Mild nonspecific abdominal and pelvic ascites as described.    Previous hysterectomy.    Arthritic changes in the spine as described.    US gallbladder 05/07/2024  Cholelithiasis and possible acute cholecystitis without biliary duct dilatation. Correlate clinically and if indicated confirm with hepatobiliary scan.      Assessment/Plan    Juanis Cornejo is a 89 y.o. female, with a PMH of HTN, CKD, Alzheimer's dementia with behavioral disturbances, and TIA who is being admitted under observation status at [facility] on 5/7/2024 for RUQ pain and concern for acute cholecystitis.     RUQ Abdominal Pain  Nausea/Vomiting  Concern for Acute Cholecystitis  Leukocytosis  - CT abd/pel: Nonspecific gallbladder wall thickening with mild adjacent edema but without calcified stone. Cannot exclude noncalcified gallstones.   - RUQ US: Cholelithiasis and possible acute cholecystitis without biliary duct dilatation  - NPO  - IVF for hydration  - Supportive care with IV antiemetics and analgesics  - c/w IV Zosyn  - Afebrile, trend WBC, follow blood cx  - General surgery consulted, awaiting recommendations  - Hold Lovenox in anticipation of possible surgery    Hx of HTN, anxiety/depression, dementia, schizophrenia  - c/w home meds as appropriate  - Adjust per clinical course  - Hold non-essential therapies, resume at discharge    GI Ppx: Protonix, bowel regimen   VTE Prophylaxis: SCDs/TEDs, ambulation, SQ Lovenox (held due to possible surgical intervention)  Code Status: Full Code    Disposition: Discharge home once medically cleared and stable, pending surgical recommendations    Silvia Pena PA-C  Observation/Internal Med [facility]  [facility]  05/07/24  9:19 PM    Total time spent [including but not limited to]: Obtaining and reviewing patient medical records/history, obtaining a separate history,  examining and assessing the patient, providing  and education, placing pertinent orders for  labs/tests/medications,  communicating with the patient/family/health team, documenting in the patient's EMR/formulation of this note, independently interpreting results and data, coordinating care:   55 minutes, with greater than 50% spent in personal discussion with patient and/or family

## 2024-05-07 NOTE — PROGRESS NOTES
Pharmacy Medication History Review    Juanis Cornejo is a 89 y.o. female admitted for No Principal Problem: There is no principal problem currently on the Problem List. Please update the Problem List and refresh.. Pharmacy reviewed the patient's [id number] medications and allergies for accuracy.    The list below reflectives the updated PTA list. Please review each medication in order reconciliation for additional clarification and justification.  Prior to Admission Medications   Prescriptions Last Dose Informant     acetaminophen (Tylenol) 325 mg tablet  Other     Sig: Take 2 tablets (650 mg) by mouth every 4 hours if needed for mild pain (1 - 3).   acetaminophen (Tylenol) 500 mg tablet  Other     Sig: Take 2 tablets (1,000 mg) by mouth 2 times a day.   amLODIPine (Norvasc) 10 mg tablet       Sig: Take 1 tablet (10 mg) by mouth once daily in the morning.   aspirin 81 mg EC tablet       Sig: Take 1 tablet (81 mg) by mouth once daily.   atorvastatin (Lipitor) 40 mg tablet       Sig: Take 1 tablet (40 mg) by mouth once daily at bedtime.   busPIRone (Buspar) 10 mg tablet  Other     Sig: Take 1 tablet (10 mg) by mouth 3 times a day.   cholecalciferol (Vitamin D3) 50 MCG (2000 UT) tablet  Other     Sig: Take 1 tablet (50 mcg) by mouth once daily.   docusate sodium (Colace) 100 mg tablet  Other     Sig: Take 1 tablet (100 mg) by mouth 2 times a day.   furosemide (Lasix) 40 mg tablet       Sig: Take 1 tablet (40 mg) by mouth once daily.   ketoconazole (NIZOral) 2 % shampoo  Other     Sig: Apply topically. Use as directed every 4 days   lisinopril 20 mg tablet       Sig: Take 1 tablet (20 mg) by mouth once daily.   loperamide (Imodium) 2 mg capsule  Other     Sig: Take 1 capsule (2 mg) by mouth if needed for diarrhea.   lubricating eye drops (Refresh Classic, PF,) ophthalmic solution       Sig: Administer 2 drops into both eyes 2 times a day.   magnesium hydroxide (Milk of Magnesia) 400 mg/5 mL suspension  Other      Sig: Take 30 mL by mouth once daily as needed for constipation.   mirtazapine (Remeron) 15 mg tablet  Other     Sig: Take 1 tablet (15 mg) by mouth once daily in the evening.   nystatin (Mycostatin) 100,000 unit/gram powder  Other     Sig: Apply 1 Application topically 2 times a day. To groin (under breast three times a day as needed after cleaning and drying)   ondansetron ODT (Zofran-ODT) 4 mg disintegrating tablet  Other     Sig: Take 1 tablet (4 mg) by mouth every 6 hours if needed for nausea or vomiting.   potassium chloride CR 20 mEq ER tablet  Other     Sig: Take 2 tablets (40 mEq) by mouth once daily. Do not crush or chew.             risperiDONE (RisperDAL) 0.5 mg tablet  Other     Sig: Take 0.5 tablets (0.25 mg) by mouth 2 times a day. AND AS NEEDED      Facility-Administered Medications: None      Allergies  Reviewed by Rosalie Otoole EMT on 5/7/2024   No Known Allergies         Below are additional concerns with the patient's PTA list.  Medication list sent from facility.    Padmini Arellano

## 2024-05-07 NOTE — PROGRESS NOTES
Patient is a 89-year-old female who presented to the emergency room with nausea, vomiting, abdominal pain and distention.  She was initially seen and evaluated by Dr. Cordon.  Please see his initial physician note for details.  Per report initial CT showed mesenteric adenitis and findings concerning for possible cholecystitis.  She did have a white count.  He added an ultrasound of the right upper quadrant which was pending at time of signout.  It did show findings concerning for possible cholecystitis.  Surgical team is consulted and patient started on Zosyn.  She is admitted for further workup and management pending their recommendations.      Ching Azevedo MD

## 2024-05-08 ENCOUNTER — APPOINTMENT (OUTPATIENT)
Dept: RADIOLOGY | Facility: HOSPITAL | Age: 89
DRG: 444 | End: 2024-05-08
Payer: MEDICARE

## 2024-05-08 PROBLEM — R10.13 EPIGASTRIC PAIN: Status: ACTIVE | Noted: 2024-05-08

## 2024-05-08 LAB
ALBUMIN SERPL BCP-MCNC: 2.9 G/DL (ref 3.4–5)
ALP SERPL-CCNC: 82 U/L (ref 33–136)
ALT SERPL W P-5'-P-CCNC: 29 U/L (ref 7–45)
ANION GAP SERPL CALC-SCNC: 11 MMOL/L (ref 10–20)
APPEARANCE UR: ABNORMAL
APTT PPP: 38 SECONDS (ref 27–38)
AST SERPL W P-5'-P-CCNC: 31 U/L (ref 9–39)
ATRIAL RATE: 70 BPM
BACTERIA #/AREA URNS AUTO: ABNORMAL /HPF
BILIRUB SERPL-MCNC: 1.2 MG/DL (ref 0–1.2)
BILIRUB UR STRIP.AUTO-MCNC: NEGATIVE MG/DL
BUN SERPL-MCNC: 16 MG/DL (ref 6–23)
CALCIUM SERPL-MCNC: 8 MG/DL (ref 8.6–10.3)
CHLORIDE SERPL-SCNC: 107 MMOL/L (ref 98–107)
CO2 SERPL-SCNC: 23 MMOL/L (ref 21–32)
COLOR UR: ABNORMAL
CREAT SERPL-MCNC: 1.14 MG/DL (ref 0.5–1.05)
CRP SERPL-MCNC: 3.79 MG/DL
EGFRCR SERPLBLD CKD-EPI 2021: 44 ML/MIN/1.73M*2
ERYTHROCYTE [DISTWIDTH] IN BLOOD BY AUTOMATED COUNT: 14.8 % (ref 11.5–14.5)
GLUCOSE SERPL-MCNC: 126 MG/DL (ref 74–99)
GLUCOSE UR STRIP.AUTO-MCNC: NORMAL MG/DL
HCT VFR BLD AUTO: 37.3 % (ref 36–46)
HGB BLD-MCNC: 12 G/DL (ref 12–16)
INR PPP: 1.6 (ref 0.9–1.1)
KETONES UR STRIP.AUTO-MCNC: ABNORMAL MG/DL
LEUKOCYTE ESTERASE UR QL STRIP.AUTO: ABNORMAL
MAGNESIUM SERPL-MCNC: 1.8 MG/DL (ref 1.6–2.4)
MCH RBC QN AUTO: 33.4 PG (ref 26–34)
MCHC RBC AUTO-ENTMCNC: 32.2 G/DL (ref 32–36)
MCV RBC AUTO: 104 FL (ref 80–100)
NITRITE UR QL STRIP.AUTO: NEGATIVE
NRBC BLD-RTO: 0 /100 WBCS (ref 0–0)
P OFFSET: 167 MS
P ONSET: 119 MS
PH UR STRIP.AUTO: 6 [PH]
PLATELET # BLD AUTO: 172 X10*3/UL (ref 150–450)
POTASSIUM SERPL-SCNC: 4.3 MMOL/L (ref 3.5–5.3)
PR INTERVAL: 206 MS
PROT SERPL-MCNC: 4.8 G/DL (ref 6.4–8.2)
PROT UR STRIP.AUTO-MCNC: ABNORMAL MG/DL
PROTHROMBIN TIME: 18 SECONDS (ref 9.8–12.8)
Q ONSET: 222 MS
QRS COUNT: 12 BEATS
QRS DURATION: 102 MS
QT INTERVAL: 400 MS
QTC CALCULATION(BAZETT): 432 MS
QTC FREDERICIA: 421 MS
R AXIS: 116 DEGREES
RBC # BLD AUTO: 3.59 X10*6/UL (ref 4–5.2)
RBC # UR STRIP.AUTO: ABNORMAL /UL
RBC #/AREA URNS AUTO: ABNORMAL /HPF
SODIUM SERPL-SCNC: 137 MMOL/L (ref 136–145)
SP GR UR STRIP.AUTO: 1.03
T AXIS: 53 DEGREES
T OFFSET: 422 MS
UROBILINOGEN UR STRIP.AUTO-MCNC: ABNORMAL MG/DL
VENTRICULAR RATE: 70 BPM
WBC # BLD AUTO: 13.7 X10*3/UL (ref 4.4–11.3)
WBC #/AREA URNS AUTO: >50 /HPF

## 2024-05-08 PROCEDURE — 85027 COMPLETE CBC AUTOMATED: CPT | Performed by: NURSE PRACTITIONER

## 2024-05-08 PROCEDURE — 81001 URINALYSIS AUTO W/SCOPE: CPT | Performed by: EMERGENCY MEDICINE

## 2024-05-08 PROCEDURE — 86140 C-REACTIVE PROTEIN: CPT | Performed by: INTERNAL MEDICINE

## 2024-05-08 PROCEDURE — 80053 COMPREHEN METABOLIC PANEL: CPT | Performed by: NURSE PRACTITIONER

## 2024-05-08 PROCEDURE — 2500000005 HC RX 250 GENERAL PHARMACY W/O HCPCS: Performed by: NURSE PRACTITIONER

## 2024-05-08 PROCEDURE — 87086 URINE CULTURE/COLONY COUNT: CPT | Mod: AHULAB | Performed by: EMERGENCY MEDICINE

## 2024-05-08 PROCEDURE — 83735 ASSAY OF MAGNESIUM: CPT | Performed by: NURSE PRACTITIONER

## 2024-05-08 PROCEDURE — 36415 COLL VENOUS BLD VENIPUNCTURE: CPT | Performed by: NURSE PRACTITIONER

## 2024-05-08 PROCEDURE — C9113 INJ PANTOPRAZOLE SODIUM, VIA: HCPCS | Performed by: NURSE PRACTITIONER

## 2024-05-08 PROCEDURE — 99232 SBSQ HOSP IP/OBS MODERATE 35: CPT | Performed by: SURGERY

## 2024-05-08 PROCEDURE — 2500000001 HC RX 250 WO HCPCS SELF ADMINISTERED DRUGS (ALT 637 FOR MEDICARE OP): Performed by: NURSE PRACTITIONER

## 2024-05-08 PROCEDURE — 85610 PROTHROMBIN TIME: CPT | Performed by: NURSE PRACTITIONER

## 2024-05-08 PROCEDURE — 78226 HEPATOBILIARY SYSTEM IMAGING: CPT | Performed by: NUCLEAR MEDICINE

## 2024-05-08 PROCEDURE — A9537 TC99M MEBROFENIN: HCPCS | Performed by: NURSE PRACTITIONER

## 2024-05-08 PROCEDURE — 1200000002 HC GENERAL ROOM WITH TELEMETRY DAILY

## 2024-05-08 PROCEDURE — 99221 1ST HOSP IP/OBS SF/LOW 40: CPT

## 2024-05-08 PROCEDURE — 2500000004 HC RX 250 GENERAL PHARMACY W/ HCPCS (ALT 636 FOR OP/ED): Performed by: NURSE PRACTITIONER

## 2024-05-08 PROCEDURE — 78227 HEPATOBIL SYST IMAGE W/DRUG: CPT

## 2024-05-08 PROCEDURE — 3430000001 HC RX 343 DIAGNOSTIC RADIOPHARMACEUTICALS: Performed by: NURSE PRACTITIONER

## 2024-05-08 RX ORDER — HYDRALAZINE HYDROCHLORIDE 20 MG/ML
5 INJECTION INTRAMUSCULAR; INTRAVENOUS EVERY 8 HOURS PRN
Status: DISCONTINUED | OUTPATIENT
Start: 2024-05-08 | End: 2024-05-16 | Stop reason: HOSPADM

## 2024-05-08 RX ORDER — KIT FOR THE PREPARATION OF TECHNETIUM TC 99M MEBROFENIN 45 MG/10ML
5.7 INJECTION, POWDER, LYOPHILIZED, FOR SOLUTION INTRAVENOUS
Status: COMPLETED | OUTPATIENT
Start: 2024-05-08 | End: 2024-05-08

## 2024-05-08 RX ADMIN — PIPERACILLIN SODIUM AND TAZOBACTAM SODIUM 3.38 G: 3; .375 INJECTION, SOLUTION INTRAVENOUS at 00:28

## 2024-05-08 RX ADMIN — ATORVASTATIN CALCIUM 40 MG: 40 TABLET, FILM COATED ORAL at 20:37

## 2024-05-08 RX ADMIN — KIT FOR THE PREPARATION OF TECHNETIUM TC 99M MEBROFENIN 5.7 MILLICURIE: 45 INJECTION, POWDER, LYOPHILIZED, FOR SOLUTION INTRAVENOUS at 13:50

## 2024-05-08 RX ADMIN — BUSPIRONE HYDROCHLORIDE 10 MG: 10 TABLET ORAL at 08:41

## 2024-05-08 RX ADMIN — PIPERACILLIN SODIUM AND TAZOBACTAM SODIUM 2.25 G: 2; .25 INJECTION, SOLUTION INTRAVENOUS at 18:00

## 2024-05-08 RX ADMIN — DOCUSATE SODIUM 100 MG: 100 CAPSULE, LIQUID FILLED ORAL at 08:41

## 2024-05-08 RX ADMIN — AMLODIPINE BESYLATE 10 MG: 10 TABLET ORAL at 08:41

## 2024-05-08 RX ADMIN — RISPERIDONE 0.25 MG: 0.25 TABLET, FILM COATED ORAL at 20:36

## 2024-05-08 RX ADMIN — ACETAMINOPHEN 650 MG: 325 TABLET ORAL at 18:54

## 2024-05-08 RX ADMIN — DOCUSATE SODIUM 100 MG: 100 CAPSULE, LIQUID FILLED ORAL at 20:37

## 2024-05-08 RX ADMIN — PIPERACILLIN SODIUM AND TAZOBACTAM SODIUM 2.25 G: 2; .25 INJECTION, SOLUTION INTRAVENOUS at 12:26

## 2024-05-08 RX ADMIN — CARBOXYMETHYLCELLULOSE SODIUM 2 DROP: 0.5 SOLUTION/ DROPS OPHTHALMIC at 20:37

## 2024-05-08 RX ADMIN — BUSPIRONE HYDROCHLORIDE 10 MG: 10 TABLET ORAL at 20:37

## 2024-05-08 RX ADMIN — LISINOPRIL 20 MG: 20 TABLET ORAL at 08:41

## 2024-05-08 RX ADMIN — PANTOPRAZOLE SODIUM 40 MG: 40 INJECTION, POWDER, FOR SOLUTION INTRAVENOUS at 06:19

## 2024-05-08 RX ADMIN — PIPERACILLIN SODIUM AND TAZOBACTAM SODIUM 3.38 G: 3; .375 INJECTION, SOLUTION INTRAVENOUS at 06:19

## 2024-05-08 RX ADMIN — CARBOXYMETHYLCELLULOSE SODIUM 2 DROP: 0.5 SOLUTION/ DROPS OPHTHALMIC at 08:41

## 2024-05-08 RX ADMIN — BUSPIRONE HYDROCHLORIDE 10 MG: 10 TABLET ORAL at 16:20

## 2024-05-08 RX ADMIN — ASPIRIN 81 MG: 81 TABLET, COATED ORAL at 08:41

## 2024-05-08 RX ADMIN — RISPERIDONE 0.25 MG: 0.25 TABLET, FILM COATED ORAL at 08:41

## 2024-05-08 RX ADMIN — MIRTAZAPINE 15 MG: 15 TABLET, FILM COATED ORAL at 20:37

## 2024-05-08 ASSESSMENT — COGNITIVE AND FUNCTIONAL STATUS - GENERAL
MOVING TO AND FROM BED TO CHAIR: TOTAL
HELP NEEDED FOR BATHING: TOTAL
STANDING UP FROM CHAIR USING ARMS: TOTAL
MOBILITY SCORE: 6
MOBILITY SCORE: 6
DRESSING REGULAR LOWER BODY CLOTHING: TOTAL
TURNING FROM BACK TO SIDE WHILE IN FLAT BAD: TOTAL
DRESSING REGULAR UPPER BODY CLOTHING: TOTAL
TURNING FROM BACK TO SIDE WHILE IN FLAT BAD: TOTAL
TOILETING: TOTAL
DAILY ACTIVITIY SCORE: 6
PERSONAL GROOMING: TOTAL
MOVING FROM LYING ON BACK TO SITTING ON SIDE OF FLAT BED WITH BEDRAILS: TOTAL
CLIMB 3 TO 5 STEPS WITH RAILING: TOTAL
STANDING UP FROM CHAIR USING ARMS: TOTAL
CLIMB 3 TO 5 STEPS WITH RAILING: TOTAL
MOVING FROM LYING ON BACK TO SITTING ON SIDE OF FLAT BED WITH BEDRAILS: TOTAL
WALKING IN HOSPITAL ROOM: TOTAL
MOVING TO AND FROM BED TO CHAIR: TOTAL
EATING MEALS: TOTAL
WALKING IN HOSPITAL ROOM: TOTAL

## 2024-05-08 ASSESSMENT — PAIN SCALES - PAIN ASSESSMENT IN ADVANCED DEMENTIA (PAINAD)
TOTALSCORE: 1
BODYLANGUAGE: RELAXED
BREATHING: NORMAL
FACIALEXPRESSION: SMILING OR INEXPRESSIVE
NEGVOCALIZATION: OCCASIONAL MOAN/GROAN, LOW SPEECH, NEGATIVE/DISAPPROVING QUALITY
CONSOLABILITY: NO NEED TO CONSOLE

## 2024-05-08 ASSESSMENT — PAIN SCALES - WONG BAKER
WONGBAKER_NUMERICALRESPONSE: NO HURT
WONGBAKER_NUMERICALRESPONSE: HURTS LITTLE MORE

## 2024-05-08 ASSESSMENT — PAIN SCALES - GENERAL
PAINLEVEL_OUTOF10: 5 - MODERATE PAIN
PAINLEVEL_OUTOF10: 0 - NO PAIN
PAINLEVEL_OUTOF10: 1

## 2024-05-08 ASSESSMENT — PAIN DESCRIPTION - LOCATION: LOCATION: ABDOMEN

## 2024-05-08 ASSESSMENT — PAIN DESCRIPTION - ORIENTATION: ORIENTATION: MID

## 2024-05-08 ASSESSMENT — ACTIVITIES OF DAILY LIVING (ADL): LACK_OF_TRANSPORTATION: NO

## 2024-05-08 ASSESSMENT — PAIN - FUNCTIONAL ASSESSMENT
PAIN_FUNCTIONAL_ASSESSMENT: PAINAD (PAIN ASSESSMENT IN ADVANCED DEMENTIA SCALE)
PAIN_FUNCTIONAL_ASSESSMENT: WONG-BAKER FACES

## 2024-05-08 NOTE — CONSULTS
Reason For Consult  Acute cholecystitis    History Of Present Illness  Juanis Cornejo is a 89 y.o. female presenting with worsening abdominal pain, nausea, vomiting and abdominal distention. Information obtained from chart review given hx of Alzheimer's. Currently resides in Memory care unit. Workup in ED included labs which indicate WBC 15.5, Crt- 1.10, K-5.5, AST- 59, CT A/P nonspecific gallbladder wall thickening with mild adjacent edema but without calcified stone. Cannot exclude noncalcified gallstones.  Recommend further evaluation with gallbladder ultrasound to exclude acute cholecystitis. US indicates cholelithiasis and possible acute cholecystitis without biliary duct dilatation. Correlate clinically and if indicated confirm with hepatobiliary scan. Surgery consulted for evaluation of acute cholecystitis.      Past Medical History  She has a past medical history of Alzheimer's dementia (Multi) (05/07/2024), Anxiety (05/07/2024), Delusional disorder (Multi) (05/07/2024), Hemochromatosis, unspecified, Hypertension (05/07/2024), Stage 3a chronic kidney disease (Multi) (05/07/2024), and TIA (transient ischemic attack) (04/18/2024).    Surgical History  She has a past surgical history that includes Cataract extraction (06/14/2013); Colonoscopy (06/14/2013); Other surgical history (06/14/2013); Carpal tunnel release (06/14/2013); Other surgical history (06/14/2013); MR angio head wo IV contrast (9/9/2013); and MR angio neck wo IV contrast (9/9/2013).     Social History  She has no history on file for tobacco use, alcohol use, and drug use.    Family History  No family history on file.     Allergies  Patient has no known allergies.    Review of Systems  SUPA given mental status     Physical Exam  Constitutional: A&Ox1, difficult to understand, mumbled speech, NAD.  Eyes: PERRL, EOMI  ENMT: Moist mucous membranes, no apparent injuries or lesions.  Head/Neck: NC/AT.   Cardiovascular: Normal rate and regular rhythm. 2+  "equal pulses of the distal extremities.  Respiratory/Thorax: CTAB, regular respirations on RA. Good symmetric chest expansion.   Gastrointestinal: Abdomen soft, TTP to RUQ, negative Grimes's sign, no peritoneal signs, +BS x 4  Genitourinary: purewick in place  Extremities: No peripheral edema. Moving all 4 extremities actively.   Neurological: A&Ox1.   Psychological: Confused     Last Recorded Vitals  Blood pressure 137/51, pulse 76, temperature 36.9 °C (98.4 °F), temperature source Temporal, resp. rate 20, height 1.473 m (4' 10\"), weight 54.2 kg (119 lb 7.8 oz), SpO2 95%.    Relevant Results  Scheduled medications  amLODIPine, 10 mg, oral, q AM  aspirin, 81 mg, oral, Daily  atorvastatin, 40 mg, oral, Nightly  busPIRone, 10 mg, oral, TID  docusate sodium, 100 mg, oral, BID  [Held by provider] enoxaparin, 40 mg, subcutaneous, q24h  [Held by provider] furosemide, 40 mg, oral, Daily  iohexol, 85 mL, intravenous, Once in imaging  lisinopril, 20 mg, oral, Daily  lubricating eye drops, 2 drop, Both Eyes, BID  mirtazapine, 15 mg, oral, q PM  pantoprazole, 40 mg, oral, Daily before breakfast   Or  pantoprazole, 40 mg, intravenous, Daily before breakfast  piperacillin-tazobactam, 3.375 g, intravenous, q6h  risperiDONE, 0.25 mg, oral, BID      Continuous medications  sodium chloride 0.9%, 75 mL/hr, Last Rate: 75 mL/hr (05/08/24 0351)      PRN medications  PRN medications: acetaminophen, melatonin, ondansetron, polyethylene glycol, risperiDONE    Results for orders placed or performed during the hospital encounter of 05/07/24 (from the past 24 hour(s))   ECG 12 lead   Result Value Ref Range    Ventricular Rate 70 BPM    Atrial Rate 70 BPM    IA Interval 206 ms    QRS Duration 102 ms    QT Interval 400 ms    QTC Calculation(Bazett) 432 ms    R Axis 116 degrees    T Axis 53 degrees    QRS Count 12 beats    Q Onset 222 ms    P Onset 119 ms    P Offset 167 ms    T Offset 422 ms    QTC Fredericia 421 ms   CBC and Auto Differential "   Result Value Ref Range    WBC 15.5 (H) 4.4 - 11.3 x10*3/uL    nRBC 0.0 0.0 - 0.0 /100 WBCs    RBC 4.01 4.00 - 5.20 x10*6/uL    Hemoglobin 13.2 12.0 - 16.0 g/dL    Hematocrit 40.6 36.0 - 46.0 %     (H) 80 - 100 fL    MCH 32.9 26.0 - 34.0 pg    MCHC 32.5 32.0 - 36.0 g/dL    RDW 14.6 (H) 11.5 - 14.5 %    Platelets 218 150 - 450 x10*3/uL    Neutrophils % 89.2 40.0 - 80.0 %    Immature Granulocytes %, Automated 0.5 0.0 - 0.9 %    Lymphocytes % 2.6 13.0 - 44.0 %    Monocytes % 7.6 2.0 - 10.0 %    Eosinophils % 0.0 0.0 - 6.0 %    Basophils % 0.1 0.0 - 2.0 %    Neutrophils Absolute 13.82 (H) 1.60 - 5.50 x10*3/uL    Immature Granulocytes Absolute, Automated 0.08 0.00 - 0.50 x10*3/uL    Lymphocytes Absolute 0.41 (L) 0.80 - 3.00 x10*3/uL    Monocytes Absolute 1.18 (H) 0.05 - 0.80 x10*3/uL    Eosinophils Absolute 0.00 0.00 - 0.40 x10*3/uL    Basophils Absolute 0.02 0.00 - 0.10 x10*3/uL   Comprehensive metabolic panel   Result Value Ref Range    Glucose 113 (H) 74 - 99 mg/dL    Sodium 136 136 - 145 mmol/L    Potassium 5.5 (H) 3.5 - 5.3 mmol/L    Chloride 105 98 - 107 mmol/L    Bicarbonate 23 21 - 32 mmol/L    Anion Gap 14 10 - 20 mmol/L    Urea Nitrogen 18 6 - 23 mg/dL    Creatinine 1.10 (H) 0.50 - 1.05 mg/dL    eGFR 46 (L) >60 mL/min/1.73m*2    Calcium 8.7 8.6 - 10.3 mg/dL    Albumin 3.8 3.4 - 5.0 g/dL    Alkaline Phosphatase 106 33 - 136 U/L    Total Protein 6.3 (L) 6.4 - 8.2 g/dL    AST 59 (H) 9 - 39 U/L    Bilirubin, Total 0.9 0.0 - 1.2 mg/dL    ALT 38 7 - 45 U/L   Lipase   Result Value Ref Range    Lipase 12 9 - 82 U/L   Blood Culture    Specimen: Peripheral Venipuncture; Blood culture   Result Value Ref Range    Blood Culture Loaded on Instrument - Culture in progress    Blood Culture    Specimen: Peripheral Venipuncture; Blood culture   Result Value Ref Range    Blood Culture Loaded on Instrument - Culture in progress    Basic metabolic panel   Result Value Ref Range    Glucose 128 (H) 74 - 99 mg/dL    Sodium 137  136 - 145 mmol/L    Potassium 4.6 3.5 - 5.3 mmol/L    Chloride 106 98 - 107 mmol/L    Bicarbonate 24 21 - 32 mmol/L    Anion Gap 12 10 - 20 mmol/L    Urea Nitrogen 16 6 - 23 mg/dL    Creatinine 1.15 (H) 0.50 - 1.05 mg/dL    eGFR 44 (L) >60 mL/min/1.73m*2    Calcium 8.1 (L) 8.6 - 10.3 mg/dL   Urinalysis with Reflex Culture and Microscopic   Result Value Ref Range    Color, Urine Orange (N) Light-Yellow, Yellow, Dark-Yellow    Appearance, Urine Ex.Turbid (N) Clear    Specific Gravity, Urine 1.035 1.005 - 1.035    pH, Urine 6.0 5.0, 5.5, 6.0, 6.5, 7.0, 7.5, 8.0    Protein, Urine 10 (TRACE) NEGATIVE, 10 (TRACE), 20 (TRACE) mg/dL    Glucose, Urine Normal Normal mg/dL    Blood, Urine 0.1 (1+) (A) NEGATIVE    Ketones, Urine TRACE (A) NEGATIVE mg/dL    Bilirubin, Urine NEGATIVE NEGATIVE    Urobilinogen, Urine 3 (1+) (A) Normal mg/dL    Nitrite, Urine NEGATIVE NEGATIVE    Leukocyte Esterase, Urine 250 Blanca/µL (A) NEGATIVE   Microscopic Only, Urine   Result Value Ref Range    WBC, Urine >50 (A) 1-5, NONE /HPF    RBC, Urine 11-20 (A) NONE, 1-2, 3-5 /HPF    Bacteria, Urine 4+ (A) NONE SEEN /HPF       US gallbladder    Result Date: 5/7/2024  Interpreted By:  Mushtaq Fitzgerald, STUDY: US GALLBLADDER   INDICATION: Signs/Symptoms:Abdominal pain.   ACCESSION NUMBER(S): [id number]   ORDERING CLINICIAN: BILLY BRISENO   TECHNIQUE: Real-time ultrasound right upper quadrant was performed.   FINDINGS: The study is limited due to inability of patient to be appropriately positioned and to cooperate.   The liver is normal in size and echogenicity without focal lesions. The gallbladder contains sludge and multiple mobile echogenic foci with posterior shadowing, consistent with gallstones. There is no mild gallbladder wall thickening/edema and positive sonographic Grimes's sign, suggestive of possible acute cholecystitis. The intra and extrahepatic biliary ducts are not dilated. The common bile duct measures up to 5 mm in caliber. The pancreas  is unremarkable. The right kidney is normal. There is trace amount of free fluid.       Cholelithiasis and possible acute cholecystitis without biliary duct dilatation. Correlate clinically and if indicated confirm with hepatobiliary scan.     Signed by: Mushtaq Fitzgerald 5/7/2024 4:16 PM Dictation workstation:   [id number]    CT abdomen pelvis wo IV contrast    Result Date: 5/7/2024  Interpreted By:  Terence Ya, STUDY: CT ABDOMEN PELVIS WO IV CONTRAST;  5/7/2024 2:42 pm   INDICATION: 89 y/o   F with  Signs/Symptoms:Abdominal pain and distention.   LIMITATIONS: Evaluation of the solid organs is limited due to non use of IV contrast.   ACCESSION NUMBER(S): [id number]   ORDERING CLINICIAN: BILLY BRISENO   TECHNIQUE: Thin-section noncontrast spiral axial images were obtained from the xiphoid down through the symphysis pubis. Sagittal and coronal reconstruction images were generated. Bone, mediastinal, lung, and liver windows were reviewed.   COMPARISON: None.   FINDINGS: LUNG BASES: Cardiomegaly. No pericardial effusion. There are small bilateral pleural effusions, slightly larger on the right than on the left. There is associated atelectasis posteriorly in both lower lobes.   LIVER: No hepatomegaly. Liver density was  within the limits of normal. No gross liver lesion in this unenhaced exam.   GALLBLADDER: No calcified stone in the gallbladder. No gallbladder dilatation. There is however mild nonspecific gallbladder wall thickening with subtle adjacent edema.   BILE DUCTS: No intrahepatic biliary ductal dilatation. Mild common bile duct dilatation up to 9 mm in diameter, tapering to normal distally.   SPLEEN: No splenomegaly. No gross splenic mass..   PANCREAS: No pancreatic mass or inflammation, or ductal dilatation.   KIDNEYS/ADRENALS: No adrenal mass or enlargement. Punctate nonobstructing upper pole left renal stone on axial image 55. No other calcified stone on either side. No hydronephrosis, mass, or  perinephric edema in either kidney. No ureteral stone or dilatation.   BLADDER/PELVIS: Urinary bladder was grossly intact. Previous hysterectomy.  No gross adnexal mass.   GREAT VESSELS/RETROPERITONEUM: Moderate to advanced aortoiliac calcifications. Otherwise, the abdominal aorta and IVC were intact. No suspicious retroperitoneal adenopathy. There is mesenteric adenopathy in the lower right abdomen/upper right pelvis medial to the right colon and cecum. Lymph nodes measure up to 16 mm in diameter and there is infiltration of the mesenteric fat in this area. No suspicious pelvic or inguinal adenopathy.   PERITONEUM: Mild ascites adjacent to the liver and spleen. Trace fluid in the right pericolic gutter. Very mild free pelvic fluid. No pneumoperitoneum. No peritoneal or mesenteric mass or inflammation.   BOWEL: The stomach was grossly intact. There was no small bowel dilatation or small bowel wall thickening. No small-bowel obstruction. Sigmoid diverticulosis. Mild  left colon diverticulosis. Mild right colon diverticulosis. Very mild stool or gas in the right colon and transverse colon with mild stool and gas in the left colon down through the sigmoid with moderate stool in the rectum. There was no colonic wall thickening or large bowel obstruction.  No edema adjacent to the colon. The cecal appendix could not be identified.   BONES: No destructive lytic or blastic bone lesion. Slight lumbar levoscoliosis. There is slight loss of height at T12, apparently remote. Moderate disc space narrowing with endplate spurring and vacuum disc phenomenon at L3-4. Trace anterolisthesis of L4 over L5 with mild endplate spurring and vacuum disc phenomenon at that level. Mild disc space narrowing with endplate spurring at L2-3. Moderate multilevel distal thoracic spine DJD. Interfacet hypertrophy with spur formation throughout the mid and distal lumbar spine. Sclerotic arthritic changes in both SI joints.   ABDOMINAL WALL:  Unremarkable.       Small nonspecific bilateral pleural effusions with associated posterior bibasilar lung atelectasis.   Punctate nonobstructing stone in the upper pole of the left kidney.   Scattered colonic diverticulosis without acute associated inflammation.   Nonspecific mesenteric adenopathy with associated fat stranding medial to the cecum and right colon. Question mesenteric adenitis.   Nonspecific gallbladder wall thickening with mild adjacent edema but without calcified stone. Cannot exclude noncalcified gallstones. Recommend further evaluation with gallbladder ultrasound to exclude acute cholecystitis.   Mild nonspecific abdominal and pelvic ascites as described.   Previous hysterectomy.   Arthritic changes in the spine as described.   MACRO: None   Signed by: Terence Ya 5/7/2024 2:57 PM Dictation workstation:   [id number]    ECG 12 lead    Result Date: 5/7/2024  Normal sinus rhythm Anterolateral infarct (cited on or before 21-NOV-2014) Abnormal ECG When compared with ECG of 18-APR-2024 13:22, QRS axis Shifted right T wave amplitude has decreased in Anterior leads T wave inversion no longer evident in Lateral leads    Transthoracic Echo (TTE) Complete    Addendum Date: 4/20/2024    Bubble study was performed.  No clear PFO or shunt. [id number] Vincent Hanson MD Electronically Amended 4/20/2024, 11:21 AM  Final (Amended)    Result Date: 4/20/2024   [facility], [address] [place] [address]              Tel [phone] and Fax [phone] TRANSTHORACIC ECHOCARDIOGRAM REPORT  Patient Name:      ANTONY Jenkins Physician:    [id number] Raheel Gonzalez MD Study Date:        4/19/2024           Ordering Provider:    [id number] LAVINIA MANRIQUE MRN/PID:           [id number]            Fellow: Accession#:        [id number]        Nurse: Date of Birth/Age:  6/15/1928 / 89      Sonographer:          Shaq Guan RDCS                    years Gender:            F                   Additional Staff: Height:            156.00 cm           Admit Date: Weight:            46.50 kg            Admission Status:     Observation -                                                              Priority discharge BSA / BMI:         1.43 m2 / 19.11     Encounter#:           [id number]                    kg/m2                                        Department Location:  [facility] Non                                                              Invasive Blood Pressure: 116 /48 mmHg Study Type:    TRANSTHORACIC ECHO (TTE) COMPLETE Diagnosis/ICD: Other transient cerebral ischemic attacks and related                syndromes-G45.8 Indication:    Transischemic Attack CPT Code:      Echo Complete w Full [id number] Patient History: Pertinent History: TIA. Study Detail: The following Echo studies were performed: 2D, M-Mode, Doppler and               color flow. Image quality for this study is suboptimal.               Technically challenging study due to poor acoustic windows and               patient lying in supine position.  PHYSICIAN INTERPRETATION: Left Ventricle: The left ventricular systolic function is hyperdynamic, with an estimated ejection fraction of 70-75%. There are no regional wall motion abnormalities. The left ventricular cavity size is decreased. Left ventricular diastolic filling was indeterminate. Left Atrium: The left atrium is normal in size. Right Ventricle: The right ventricle is normal in size. There is normal right ventricular global systolic function. Right Atrium: The right atrium was not assessed. Aortic Valve: The aortic valve is trileaflet. The aortic valve appears degenerative. There is mild to moderate aortic valve cusp calcification. There is trivial aortic valve regurgitation. The peak instantaneous gradient of the aortic valve is 17.1 mmHg. Mitral  Valve: The mitral valve is mildly thickened. There is mild mitral annular calcification. There is trace mitral valve regurgitation. Tricuspid Valve: The tricuspid valve is structurally normal. No evidence of tricuspid regurgitation. Pulmonic Valve: The pulmonic valve is not well visualized. There is trace pulmonic valve regurgitation. Pericardium: There is no pericardial effusion noted. Aorta: The aortic root is normal. Systemic Veins: The inferior vena cava was not well visualized. In comparison to the previous echocardiogram(s): There are no prior studies on this patient for comparison purposes.  CONCLUSIONS:  1. Left ventricular systolic function is hyperdynamic with a 70-75% estimated ejection fraction.  2. Left ventricular cavity size is decreased.  3. Image quality for this study is suboptimal. QUANTITATIVE DATA SUMMARY: 2D MEASUREMENTS:                          Normal Ranges: LAs:           2.40 cm   (2.7-4.0cm) IVSd:          0.92 cm   (0.6-1.1cm) LVPWd:         0.82 cm   (0.6-1.1cm) LVIDd:         3.13 cm   (3.9-5.9cm) LVIDs:         1.43 cm LV Mass Index: 49.8 g/m2 LV % FS        54.3 % LA VOLUME:                              Normal Ranges: LA Vol A4C:        19.8 ml   (22+/-6mL/m2) LA Vol Index A4C:  13.9ml/m2 LA Area A4C:       9.9 cm2 LA Major Axis A4C: 4.2 cm AORTA MEASUREMENTS:                    Normal Ranges: Asc Ao, d: 2.10 cm (2.1-3.4cm) LV SYSTOLIC FUNCTION BY 2D PLANIMETRY (MOD):                     Normal Ranges: EF-A4C View: 63.8 % (>=55%) EF-A2C View: 73.1 % EF-Biplane:  68.5 % LV DIASTOLIC FUNCTION:                        Normal Ranges: MV Peak E:    1.16 m/s (0.7-1.2 m/s) MV Peak A:    1.72 m/s (0.42-0.7 m/s) E/A Ratio:    0.67     (1.0-2.2) MV lateral e' 0.05 m/s MV medial e'  0.05 m/s MITRAL VALVE:                       Normal Ranges: MV Vmax:    1.69 m/s  (<=1.3m/s) MV peak P.4 mmHg (<5mmHg) MV mean P.0 mmHg  (<2mmHg) MV DT:      156 msec  (150-240msec) AORTIC VALVE:                           Normal Ranges: AoV Vmax:      2.07 m/s  (<=1.7m/s) AoV Peak P.1 mmHg (<20mmHg) LVOT Max Ronni:  1.16 m/s  (<=1.1m/s) LVOT VTI:      24.60 cm LVOT Diameter: 1.90 cm   (1.8-2.4cm) AoV Area,Vmax: 1.59 cm2  (2.5-4.5cm2)  RIGHT VENTRICLE: TAPSE: 23.9 mm RV s'  0.15 m/s PULMONIC VALVE:                         Normal Ranges: PV Accel Time: 82 msec  (>120ms) PV Max Ronni:    1.4 m/s  (0.6-0.9m/s) PV Max P.2 mmHg  [id number] Raheel Gonzalez MD Electronically signed on 2024 at 1:01:16 PM  ** Final **     MR brain wo IV contrast    Result Date: 2024  Interpreted By:  Ketan Diaz and Shapiro Boris STUDY: MR BRAIN WO IV CONTRAST; MR ANGIO NECK WO IV CONTRAST; MR ANGIO HEAD WO IV CONTRAST;  2024 9:59 pm   INDICATION: Signs/Symptoms:stroke like symptoms.   COMPARISON: Brain MRI 2013. CT brain dated 2024.   ACCESSION NUMBER(S): [id number]; [id number]; [id number]   ORDERING CLINICIAN: LAVINIA MANRIQUE   TECHNIQUE: Axial T2, FLAIR, DWI, gradient echo T2 and sagittal and coronal T1 weighted images of brain were acquired. Noncontrast time-of-flight MR angiogram of the [id number] vessels was obtained with MIP reformats. Noncontrast time of flight MR angiogram of the neck vessels was obtained with MIP reformats.   FINDINGS: Brain MRI: There is no evidence of acute infarction. There are no extra-axial collections. There is no mass lesion and no midline shift. There is no hydrocephalus. There is generalized cerebral volume loss and associated ventricular and sulcal enlargement slightly progressed since . Scattered periventricular and deep white matter FLAIR hyperintensities suggestive of mild to moderate chronic microvascular ischemic change is grossly similar since 2013. No evidence of intracranial hemorrhage.   Paranasal Sinuses and Mastoids: Visualized paranasal sinuses are well aerated. Trace amount of fluid in the caudal right mastoid air cells. Left  mastoid air cells are clear. The orbits are grossly normal. Bilateral cataract surgeries.   There is a degenerative pannus in the retro odontoid region contributing to mild central canal stenosis at the level of C1 similar to prior.   MRA of the brain: Anterior circulation: The intracranial portions of the internal carotid, middle cerebral, and anterior cerebral arteries are patent, without significant focal stenosis. There is a normal anterior communicating artery.   Posterior circulation: The intracranial portions of the right vertebral, basilar, and proximal posterior cerebral arteries are patent without focal stenosis. Slightly limited evaluation of the bilateral posterior cerebral arteries. There is lack of flow enhancement of the left vertebral artery with corresponding absent flow void within the left vertebral artery and there is only focal region of enhancement of the left vertebral artery near the vertebrobasilar junction, for example axial image 5 of 125. There is a question of intramural hematoma versus surrounding venous plexus artifact around the left upper cervical vertebral artery, axial image 41 through 37 of 41.,     MRA of the neck: Common carotid arteries: Limited evaluation. Left internal carotid: No significant stenosis. Right internal carotid: No significant stenosis. Cervical vertebral arteries: The right vertebral artery is widely patent without focal stenosis. 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       There is no evidence of acute hemorrhage, mass lesion or acute infarction. Slight progression of generalized cerebral volume loss.   There is age indeterminate occlusion of the left vertebral artery new since 2013 there is a question of surrounding hematoma around the upper cervical left vertebral artery, for example axial T2 image 41 of 41. 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 Further evaluation with CT angiogram of the  neck and MRA neck dissection protocol is recommended.   Signed by: Ketan Diaz 4/20/2024 8:41 AM Dictation workstation:   [id number]    MR angio head wo IV contrast    Result Date: 4/20/2024  Interpreted By:  Ketan Diaz,  and Diana Bloom STUDY: MR BRAIN WO IV CONTRAST; MR ANGIO NECK WO IV CONTRAST; MR ANGIO HEAD WO IV CONTRAST;  4/19/2024 9:59 pm   INDICATION: Signs/Symptoms:stroke like symptoms.   COMPARISON: Brain MRI 09/09/2013. CT brain dated 04/18/2024.   ACCESSION NUMBER(S): [id number]; [id number]; [id number]   ORDERING CLINICIAN: LAVINIA MANRIQUE   TECHNIQUE: Axial T2, FLAIR, DWI, gradient echo T2 and sagittal and coronal T1 weighted images of brain were acquired. Noncontrast time-of-flight MR angiogram of the [id number] vessels was obtained with MIP reformats. Noncontrast time of flight MR angiogram of the neck vessels was obtained with MIP reformats.   FINDINGS: Brain MRI: There is no evidence of acute infarction. There are no extra-axial collections. There is no mass lesion and no midline shift. There is no hydrocephalus. There is generalized cerebral volume loss and associated ventricular and sulcal enlargement slightly progressed since 2013. Scattered periventricular and deep white matter FLAIR hyperintensities suggestive of mild to moderate chronic microvascular ischemic change is grossly similar since 2013. No evidence of intracranial hemorrhage.   Paranasal Sinuses and Mastoids: Visualized paranasal sinuses are well aerated. Trace amount of fluid in the caudal right mastoid air cells. Left mastoid air cells are clear. The orbits are grossly normal. Bilateral cataract surgeries.   There is a degenerative pannus in the retro odontoid region contributing to mild central canal stenosis at the level of C1 similar to prior.   MRA of the brain: Anterior circulation: The intracranial portions of the internal carotid, middle cerebral, and anterior cerebral arteries are patent,  without significant focal stenosis. There is a normal anterior communicating artery.   Posterior circulation: The intracranial portions of the right vertebral, basilar, and proximal posterior cerebral arteries are patent without focal stenosis. Slightly limited evaluation of the bilateral posterior cerebral arteries. There is lack of flow enhancement of the left vertebral artery with corresponding absent flow void within the left vertebral artery and there is only focal region of enhancement of the left vertebral artery near the vertebrobasilar junction, for example axial image 5 of 125. There is a question of intramural hematoma versus surrounding venous plexus artifact around the left upper cervical vertebral artery, axial image 41 through 37 of 41.,     MRA of the neck: Common carotid arteries: Limited evaluation. Left internal carotid: No significant stenosis. Right internal carotid: No significant stenosis. Cervical vertebral arteries: The right vertebral artery is widely patent without focal stenosis. 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       There is no evidence of acute hemorrhage, mass lesion or acute infarction. Slight progression of generalized cerebral volume loss.   There is age indeterminate occlusion of the left vertebral artery new since 2013 there is a question of surrounding hematoma around the upper cervical left vertebral artery, for example axial T2 image 41 of 41. 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 Further evaluation with CT angiogram of the neck and MRA neck dissection protocol is recommended.   Signed by: Ketan Diaz 4/20/2024 8:41 AM Dictation workstation:   [id number]    MR angio neck wo IV contrast    Result Date: 4/20/2024  Interpreted By:  Ketan Diaz  and Diana Bloom STUDY: MR BRAIN WO IV CONTRAST; MR ANGIO NECK WO IV CONTRAST; MR ANGIO HEAD WO IV CONTRAST;  4/19/2024 9:59 pm   INDICATION:  Signs/Symptoms:stroke like symptoms.   COMPARISON: Brain MRI 09/09/2013. CT brain dated 04/18/2024.   ACCESSION NUMBER(S): [id number]; [id number]; [id number]   ORDERING CLINICIAN: LAVINIA MANRIQUE   TECHNIQUE: Axial T2, FLAIR, DWI, gradient echo T2 and sagittal and coronal T1 weighted images of brain were acquired. Noncontrast time-of-flight MR angiogram of the [id number] vessels was obtained with MIP reformats. Noncontrast time of flight MR angiogram of the neck vessels was obtained with MIP reformats.   FINDINGS: Brain MRI: There is no evidence of acute infarction. There are no extra-axial collections. There is no mass lesion and no midline shift. There is no hydrocephalus. There is generalized cerebral volume loss and associated ventricular and sulcal enlargement slightly progressed since 2013. Scattered periventricular and deep white matter FLAIR hyperintensities suggestive of mild to moderate chronic microvascular ischemic change is grossly similar since 2013. No evidence of intracranial hemorrhage.   Paranasal Sinuses and Mastoids: Visualized paranasal sinuses are well aerated. Trace amount of fluid in the caudal right mastoid air cells. Left mastoid air cells are clear. The orbits are grossly normal. Bilateral cataract surgeries.   There is a degenerative pannus in the retro odontoid region contributing to mild central canal stenosis at the level of C1 similar to prior.   MRA of the brain: Anterior circulation: The intracranial portions of the internal carotid, middle cerebral, and anterior cerebral arteries are patent, without significant focal stenosis. There is a normal anterior communicating artery.   Posterior circulation: The intracranial portions of the right vertebral, basilar, and proximal posterior cerebral arteries are patent without focal stenosis. Slightly limited evaluation of the bilateral posterior cerebral arteries. There is lack of flow enhancement of the left vertebral  artery with corresponding absent flow void within the left vertebral artery and there is only focal region of enhancement of the left vertebral artery near the vertebrobasilar junction, for example axial image 5 of 125. There is a question of intramural hematoma versus surrounding venous plexus artifact around the left upper cervical vertebral artery, axial image 41 through 37 of 41.,     MRA of the neck: Common carotid arteries: Limited evaluation. Left internal carotid: No significant stenosis. Right internal carotid: No significant stenosis. Cervical vertebral arteries: The right vertebral artery is widely patent without focal stenosis. 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       There is no evidence of acute hemorrhage, mass lesion or acute infarction. Slight progression of generalized cerebral volume loss.   There is age indeterminate occlusion of the left vertebral artery new since 2013 there is a question of surrounding hematoma around the upper cervical left vertebral artery, for example axial T2 image 41 of 41. 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 Further evaluation with CT angiogram of the neck and MRA neck dissection protocol is recommended.   Signed by: Ketan Diaz 4/20/2024 8:41 AM Dictation workstation:   [id number]    ECG 12 lead    Result Date: 4/19/2024  Normal sinus rhythm Left axis deviation Minimal voltage criteria for LVH, may be normal variant ( Abelardo product ) Anterolateral infarct (cited on or before 21-NOV-2014) Abnormal ECG When compared with ECG of 21-NOV-2014 10:27, Significant changes have occurred See ED provider note for full interpretation and clinical correlation Confirmed by Brissa Naylor ([id number]) on 4/19/2024 9:58:58 AM    XR chest 1 view    Result Date: 4/18/2024  Interpreted By:  Mushtaq Fitzgerald, STUDY: XR CHEST 1 VIEW; 4/18/2024 1:52 pm   INDICATION: Signs/Symptoms:right sided weakness   COMPARISON:  September 2013   ACCESSION NUMBER(S): [id number]   ORDERING CLINICIAN: HILLARY ARMANDO   FINDINGS: The study is limited due to rotation and poor inspiratory effort, with resultant crowding of the pulmonary vasculature. The cardiomediastinal silhouette is within normal limits for the technique. Prominence of the interstitial markings is noted bilaterally. There is no pneumothorax, confluent infiltrates or significant effusion. The osseous structures are changed.       Allowing for the aforementioned limitation, interstitial lung disease without acute infiltrate.   Signed by: Mushtaq Fitzgerald 4/18/2024 2:25 PM Dictation workstation:   [id number]    CT brain attack head wo IV contrast    Result Date: 4/18/2024  Interpreted By:  Manolo Lara, STUDY: CT BRAIN ATTACK HEAD WO IV CONTRAST;  4/18/2024 1:12 pm   INDICATION: Signs/Symptoms:Stroke Evaluation.   COMPARISON: September 9, 2013 head CT, September 9, 2013 MRI brain   ACCESSION NUMBER(S): [id number]   ORDERING CLINICIAN: HILLARY ARMANDO   TECHNIQUE: Noncontrast axial CT scan of head was performed. Angled reformats in brain and bone windows were generated. The images were reviewed in bone, brain, blood and soft tissue windows.   FINDINGS: CSF Spaces: The ventricles, sulci and basal cisterns are within normal limits. There is no extraaxial fluid collection.   Parenchyma:  The grey-white differentiation is intact. There is no mass effect or midline shift.  There is no intracranial hemorrhage.   Calvarium: The calvarium is unremarkable.   Paranasal sinuses and mastoids: Minimal partial opacification of the right mastoid air cells. The paranasal sinuses are clear.       No evidence of acute cortical infarct or intracranial hemorrhage.   Atrophy and nonspecific low-density white matter changes.   Minimal partial opacification right mastoid air cells.   MACRO: Manolo Lara discussed the significance and urgency of this critical finding by secure chat with  HILLARY  TR on 4/18/2024 at 1:38 pm.  (**-RCF-**) Findings:  See findings.     Signed by: Manolo Lara 4/18/2024 1:38 PM Dictation workstation:   [id number]        Assessment/Plan   Cholelithiasis/Acute cholecystitis:   A/P:  - admitted to medicine  - NPO  - IV Zosyn  - monitor labs (trend LFTs)  - prn pain management per primary  - prn antiemetics  - will need to discuss with POA about possible perc curtis tube placement vs lap curtis  - Discussed with Dr. Nina Faustin, APRN-CNP

## 2024-05-08 NOTE — PROGRESS NOTES
"Juanis Cornejo is a 89 y.o. female on day 0 of admission presenting with RUQ abdominal pain.    Assessment/Plan   HIDA scan showed partial filling of the gallbladder.  She is obviously not a candidate for surgical intervention.  Defer to IR need for percutaneous cholecystostomy tube.  Since her cystic duct is not completely obstructed we may be able to get away with just IV antibiotics.  In patients with underlying cognitive decline percutaneous cholecystostomy tubes are frequently dislodged.  In the meantime continue broad-spectrum antibiotics.  Will follow with you.    Subjective   Patient brought here from memory care unit with some mental status changes. We were consulted regarding possible cholecystitis.  Patient unable to provide much history       Objective     Physical Exam  Frail elderly lady  No scleral icterus  She is tender in the epigastrium    Last Recorded Vitals  Blood pressure (!) 102/30, pulse 97, temperature 36.6 °C (97.9 °F), temperature source Temporal, resp. rate 18, height 1.473 m (4' 10\"), weight 54.2 kg (119 lb 7.8 oz), SpO2 97%.  Intake/Output last 3 Shifts:  I/O last 3 completed shifts:  In: 503.8 (9.3 mL/kg) [I.V.:403.8 (7.4 mL/kg); IV Piggyback:100]  Out: - (0 mL/kg)   Weight: 54.2 kg     Relevant Results    Scheduled medications  amLODIPine, 10 mg, oral, q AM  aspirin, 81 mg, oral, Daily  atorvastatin, 40 mg, oral, Nightly  busPIRone, 10 mg, oral, TID  docusate sodium, 100 mg, oral, BID  [Held by provider] enoxaparin, 40 mg, subcutaneous, q24h  [Held by provider] furosemide, 40 mg, oral, Daily  iohexol, 85 mL, intravenous, Once in imaging  lisinopril, 20 mg, oral, Daily  lubricating eye drops, 2 drop, Both Eyes, BID  mirtazapine, 15 mg, oral, q PM  pantoprazole, 40 mg, oral, Daily before breakfast   Or  pantoprazole, 40 mg, intravenous, Daily before breakfast  piperacillin-tazobactam, 2.25 g, intravenous, q6h  risperiDONE, 0.25 mg, oral, BID  Tc-99m-albumin, 5.7 millicurie, intravenous, " Once in imaging      Continuous medications     PRN medications  PRN medications: acetaminophen, hydrALAZINE, melatonin, ondansetron, polyethylene glycol, risperiDONE    Results for orders placed or performed during the hospital encounter of 05/07/24 (from the past 24 hour(s))   Basic metabolic panel   Result Value Ref Range    Glucose 128 (H) 74 - 99 mg/dL    Sodium 137 136 - 145 mmol/L    Potassium 4.6 3.5 - 5.3 mmol/L    Chloride 106 98 - 107 mmol/L    Bicarbonate 24 21 - 32 mmol/L    Anion Gap 12 10 - 20 mmol/L    Urea Nitrogen 16 6 - 23 mg/dL    Creatinine 1.15 (H) 0.50 - 1.05 mg/dL    eGFR 44 (L) >60 mL/min/1.73m*2    Calcium 8.1 (L) 8.6 - 10.3 mg/dL   Urinalysis with Reflex Culture and Microscopic   Result Value Ref Range    Color, Urine Orange (N) Light-Yellow, Yellow, Dark-Yellow    Appearance, Urine Ex.Turbid (N) Clear    Specific Gravity, Urine 1.035 1.005 - 1.035    pH, Urine 6.0 5.0, 5.5, 6.0, 6.5, 7.0, 7.5, 8.0    Protein, Urine 10 (TRACE) NEGATIVE, 10 (TRACE), 20 (TRACE) mg/dL    Glucose, Urine Normal Normal mg/dL    Blood, Urine 0.1 (1+) (A) NEGATIVE    Ketones, Urine TRACE (A) NEGATIVE mg/dL    Bilirubin, Urine NEGATIVE NEGATIVE    Urobilinogen, Urine 3 (1+) (A) Normal mg/dL    Nitrite, Urine NEGATIVE NEGATIVE    Leukocyte Esterase, Urine 250 Blanca/µL (A) NEGATIVE   Microscopic Only, Urine   Result Value Ref Range    WBC, Urine >50 (A) 1-5, NONE /HPF    RBC, Urine 11-20 (A) NONE, 1-2, 3-5 /HPF    Bacteria, Urine 4+ (A) NONE SEEN /HPF   CBC   Result Value Ref Range    WBC 13.7 (H) 4.4 - 11.3 x10*3/uL    nRBC 0.0 0.0 - 0.0 /100 WBCs    RBC 3.59 (L) 4.00 - 5.20 x10*6/uL    Hemoglobin 12.0 12.0 - 16.0 g/dL    Hematocrit 37.3 36.0 - 46.0 %     (H) 80 - 100 fL    MCH 33.4 26.0 - 34.0 pg    MCHC 32.2 32.0 - 36.0 g/dL    RDW 14.8 (H) 11.5 - 14.5 %    Platelets 172 150 - 450 x10*3/uL   Magnesium   Result Value Ref Range    Magnesium 1.80 1.60 - 2.40 mg/dL   Coagulation Screen   Result Value Ref Range     Protime 18.0 (H) 9.8 - 12.8 seconds    INR 1.6 (H) 0.9 - 1.1    aPTT 38 27 - 38 seconds   Comprehensive Metabolic Panel   Result Value Ref Range    Glucose 126 (H) 74 - 99 mg/dL    Sodium 137 136 - 145 mmol/L    Potassium 4.3 3.5 - 5.3 mmol/L    Chloride 107 98 - 107 mmol/L    Bicarbonate 23 21 - 32 mmol/L    Anion Gap 11 10 - 20 mmol/L    Urea Nitrogen 16 6 - 23 mg/dL    Creatinine 1.14 (H) 0.50 - 1.05 mg/dL    eGFR 44 (L) >60 mL/min/1.73m*2    Calcium 8.0 (L) 8.6 - 10.3 mg/dL    Albumin 2.9 (L) 3.4 - 5.0 g/dL    Alkaline Phosphatase 82 33 - 136 U/L    Total Protein 4.8 (L) 6.4 - 8.2 g/dL    AST 31 9 - 39 U/L    Bilirubin, Total 1.2 0.0 - 1.2 mg/dL    ALT 29 7 - 45 U/L   C-reactive protein   Result Value Ref Range    C-Reactive Protein 3.79 (H) <1.00 mg/dL           I spent 25 minutes in the professional and overall care of this patient.      Roberto Saez MD

## 2024-05-08 NOTE — PROGRESS NOTES
[facility] [phone]     05/08/24 1048   Current Planned Discharge Disposition   Current Planned Discharge Disposition Home

## 2024-05-08 NOTE — PROGRESS NOTES
05/08/24 1048   [facility] Disability Status   Are you deaf or do you have serious difficulty hearing? N   Are you blind or do you have serious difficulty seeing, even when wearing glasses? N   Because of a physical, mental, or emotional condition, do you have serious difficulty concentrating, remembering, or making decisions? (5 years old or older) Y   Do you have serious difficulty walking or climbing stairs? Y   Do you have serious difficulty dressing or bathing? Y   Because of a physical, mental, or emotional condition, do you have serious difficulty doing errands alone such as visiting the doctor? Y

## 2024-05-08 NOTE — PROGRESS NOTES
Transitional Care Coordination Progress Note:  Plan per Medical/Surgical team: treatment of cholecystitis with surgery consult  Status: Observation  Payor source: [employer] medicare  Discharge disposition: [facility] [phone]  Potential Barriers: dementia  ADOD: 5/9/2024  CAN Gay RN, BSN Transitional Care Coordinator ED# [phone]      05/08/24 1048   Discharge Planning   Living Arrangements Alone   Support Systems Children   Assistance Needed ? surgery   Type of Residence Assisted living   Do you have animals or pets at home? No   Care Facility Name [facility]  [phone]   Home or Post Acute Services Post acute facilities (Rehab/SNF/etc)   Type of Post Acute Facility Services Assisted living   Patient expects to be discharged to: [facility]  [phone]   Does the patient need discharge transport arranged? Yes   RoundTrip coordination needed? Yes   Has discharge transport been arranged? No   Financial Resource Strain   How hard is it for you to pay for the very basics like food, housing, medical care, and heating? Not hard   Housing Stability   In the last 12 months, was there a time when you were not able to pay the mortgage or rent on time? N   In the last 12 months, how many places have you lived? 1   In the last 12 months, was there a time when you did not have a steady place to sleep or slept in a shelter (including now)? N   Transportation Needs   In the past 12 months, has lack of transportation kept you from medical appointments or from getting medications? no   In the past 12 months, has lack of transportation kept you from meetings, work, or from getting things needed for daily living? No

## 2024-05-08 NOTE — CARE PLAN
The patient's goals for the shift include      The clinical goals for the shift include Pt will remain HDS      Problem: Pain  Goal: My pain/discomfort is manageable  Outcome: Progressing     Problem: Safety  Goal: Patient will be injury free during hospitalization  Outcome: Progressing  Goal: I will remain free of falls  Outcome: Progressing     Problem: Daily Care  Goal: Daily care needs are met  Outcome: Progressing     Problem: Psychosocial Needs  Goal: Demonstrates ability to cope with hospitalization/illness  Outcome: Progressing  Goal: Collaborate with me, my family, and caregiver to identify my specific goals  Outcome: Progressing  Flowsheets (Taken 5/7/2024 8767)  Cultural Requests During Hospitalization: SUPA  Spiritual Requests During Hospitalization: SUPA     Problem: Discharge Barriers  Goal: My discharge needs are met  Outcome: Progressing

## 2024-05-08 NOTE — CONSULTS
Consults    Reason For Consult  Evaluation for percutaneous cholecystostomy    History Of Present Illness  Juanis Cornejo is a 89 y.o. female presenting with a past medical history of HTN, CKD stage 3, sinus bradycardia, and Alzheimer dementia. Faina is confused at her baseline and unable to contribute to HPI, information was obtained via chart and from the medicine [facility]. Patient presented with abdominal pain, nausea and vomiting.  ED work up was significant for elevated WBC count 15.5, ANC 13.82, CRP 3.79, and positive for a urinary tract infection. CT A/P was significant for nonspecific gallbladder wall thickening without calcified gall stones, had US of the GB which shows mildly thickened wall without biliary duct dilatation. IR consulted for percutaneous cholecystostomy tube.     Past Medical History  She has a past medical history of Alzheimer's dementia (Multi) (05/07/2024), Anxiety (05/07/2024), Delusional disorder (Multi) (05/07/2024), Hemochromatosis, unspecified, Hypertension (05/07/2024), Stage 3a chronic kidney disease (Multi) (05/07/2024), and TIA (transient ischemic attack) (04/18/2024).    Surgical History  She has a past surgical history that includes Cataract extraction (06/14/2013); Colonoscopy (06/14/2013); Other surgical history (06/14/2013); Carpal tunnel release (06/14/2013); Other surgical history (06/14/2013); MR angio head wo IV contrast (9/9/2013); and MR angio neck wo IV contrast (9/9/2013).     Social History  She has no history on file for tobacco use, alcohol use, and drug use.    Family History  No family history on file.     Allergies  Patient has no known allergies.    MEDS:    Current Facility-Administered Medications:     acetaminophen (Tylenol) tablet 650 mg, 650 mg, oral, q6h PRN, Silvia Pena PA-C, 650 mg at 05/07/24 2213    amLODIPine (Norvasc) tablet 10 mg, 10 mg, oral, q AM, CASH Aguero-CNP, 10 mg at 05/08/24 0841    aspirin EC tablet 81 mg, 81 mg, oral, Daily,  Charis Grajeda APRN-CNP, 81 mg at 05/08/24 0841    atorvastatin (Lipitor) tablet 40 mg, 40 mg, oral, Nightly, Charis Grajeda APRN-CNP, 40 mg at 05/07/24 2213    busPIRone (Buspar) tablet 10 mg, 10 mg, oral, TID, Charis Grajeda, APRN-CNP, 10 mg at 05/08/24 0841    docusate sodium (Colace) capsule 100 mg, 100 mg, oral, BID, Charis Grajeda, APRN-CNP, 100 mg at 05/08/24 0841    [Held by provider] enoxaparin (Lovenox) syringe 40 mg, 40 mg, subcutaneous, q24h, CASH Aguero-CNP    [Held by provider] furosemide (Lasix) tablet 40 mg, 40 mg, oral, Daily, Charis Grajeda APRN-CNP    hydrALAZINE (Apresoline) injection 5 mg, 5 mg, intravenous, q8h PRN, Luke Ortiz MD    iohexol (OMNIPaque) 350 mg iodine/mL solution 85 mL, 85 mL, intravenous, Once in imaging, Charis Grajeda APRN-CNP    lisinopril tablet 20 mg, 20 mg, oral, Daily, Charis Grajeda, APRN-CNP, 20 mg at 05/08/24 0841    lubricating eye drops ophthalmic solution 2 drop, 2 drop, Both Eyes, BID, Charis Grajeda APRN-CNP, 2 drop at 05/08/24 0841    melatonin tablet 3 mg, 3 mg, oral, Nightly PRN, Charis Grajeda, APRN-CNP, 3 mg at 05/07/24 2213    mirtazapine (Remeron) tablet 15 mg, 15 mg, oral, q PM, Charis Grajeda, APRN-CNP, 15 mg at 05/07/24 2213    ondansetron (Zofran) injection 4 mg, 4 mg, intravenous, q8h PRN, Charis Grajeda APRN-CNP    pantoprazole (ProtoNix) EC tablet 40 mg, 40 mg, oral, Daily before breakfast **OR** pantoprazole (ProtoNix) injection 40 mg, 40 mg, intravenous, Daily before breakfast, SHEILA Aguero, 40 mg at 05/08/24 0619    piperacillin-tazobactam-dextrose (Zosyn) IV 2.25 g, 2.25 g, intravenous, q6h, SHEILA Aguero, Stopped at 05/08/24 1256    polyethylene glycol (Glycolax, Miralax) packet 17 g, 17 g, oral, Daily PRN, CASH Aguero-CNP    risperiDONE (RisperDAL) tablet 0.25 mg, 0.25 mg, oral, BID, SHEILA Aguero, 0.25  mg at 05/08/24 0841    risperiDONE (RisperDAL) tablet 0.25 mg, 0.25 mg, oral, Daily PRN, Charis Grajeda, APRN-CNP    Review of Systems  History obtained from chart review     Last Recorded Vitals  BP (!) 186/101 (BP Location: Right arm, Patient Position: Lying)   Pulse 99   Temp 36.4 °C (97.5 °F) (Temporal)   Resp 18   Wt 54.2 kg (119 lb 7.8 oz)   SpO2 94%      Physical Exam  HEENT:  ecchymosis and edema to left frontal head  Pulm: clear to auscultation bilaterally, no wheezes, good air entry  Cardiac: Regular rate and rhythm or without murmur or extra heart sounds  GI:  generalized tenderness to all 4 quadrants. Did not have positive herrera's sign on examination. BS active X4.  Pulses: peripheral pulses symmetrical    Relevant Results    LABS:  Lab Results   Component Value Date    WBC 13.7 (H) 05/08/2024    HGB 12.0 05/08/2024    HCT 37.3 05/08/2024     (H) 05/08/2024     05/08/2024      Results from last 72 hours   Lab Units 05/08/24  0447   SODIUM mmol/L 137   POTASSIUM mmol/L 4.3   CHLORIDE mmol/L 107   CO2 mmol/L 23   BUN mg/dL 16   CREATININE mg/dL 1.14*   GLUCOSE mg/dL 126*   CALCIUM mg/dL 8.0*   ANION GAP mmol/L 11   EGFR mL/min/1.73m*2 44*     Results from last 72 hours   Lab Units 05/08/24  0447   ALK PHOS U/L 82   BILIRUBIN TOTAL mg/dL 1.2   PROTEIN TOTAL g/dL 4.8*   ALT U/L 29   AST U/L 31   ALBUMIN g/dL 2.9*     Results from last 72 hours   Lab Units 05/08/24  0447   INR  1.6*       MICRO:  No results found for the last 14 days.      IMAGING:   US gallbladder   Final Result   Cholelithiasis and possible acute cholecystitis without biliary duct   dilatation. Correlate clinically and if indicated confirm with   hepatobiliary scan.             Signed by: Mushtaq Fitzgerald 5/7/2024 4:16 PM   Dictation workstation:   [id number]      CT abdomen pelvis wo IV contrast   Final Result   Small nonspecific bilateral pleural effusions with associated   posterior bibasilar lung atelectasis.         Punctate nonobstructing stone in the upper pole of the left kidney.        Scattered colonic diverticulosis without acute associated   inflammation.        Nonspecific mesenteric adenopathy with associated fat stranding   medial to the cecum and right colon. Question mesenteric adenitis.        Nonspecific gallbladder wall thickening with mild adjacent edema but   without calcified stone. Cannot exclude noncalcified gallstones.   Recommend further evaluation with gallbladder ultrasound to exclude   acute cholecystitis.        Mild nonspecific abdominal and pelvic ascites as described.        Previous hysterectomy.        Arthritic changes in the spine as described.        MACRO:   None        Signed by: Terence Ya 5/7/2024 2:57 PM   Dictation workstation:   [id number]      Consult to Interventional Radiology    (Results Pending)   NM hepatobiliary    (Results Pending)          Assessment/Plan     This is a 89 y.o. female presenting with a past medical history of HTN, CKD stage 3, sinus bradycardia, and Alzheimer dementia. Patient is confused at her baseline and unable to contribute to HPI, information was obtained via chart and from the medicine [facility]. Patient presented with abdominal pain, nausea and vomiting.  ED work up was significant for elevated WBC count 15.5, ANC 13.82, CRP 3.79, and positive for a urinary tract infection. CT A/P was significant for nonspecific gallbladder wall thickening without calcified gall stones, had US of the GB which shows mildly thickened wall without biliary duct dilatation. IR consulted for percutaneous cholecystostomy tube.    Patient clinically stable at this time. She does have nonspecific GB wall thickening and abdominal pain with a negative herrera sign. She does also have a UTI which could be contributing to her abdominal pain and N/V. We would recommend HIDA scan to evaluate the patency of the cystic duct. Will plan to review HIDA scan once complete. Plan discussed  with BALWINDER Davila and BALWINDER Jean Baptiste.    IR to follow.     SHEILA Talavera    Time : Billing Time  Prep time on date of the patient encounter: 15 minutes.   Time spent directly with patient/family/caregiver: 15 minutes.   Documentation time: 15 minutes.   Total time (minutes):  45 minutes  Time Spent with this Patient (minutes).  More than 50% of This Time was Spent in Counseling and/or Coordination of Care

## 2024-05-08 NOTE — PROGRESS NOTES
Juanis Cornejo is a 89 y.o. female on day 0 of admission presenting with RUQ abdominal pain.    Subjective   Oriented to self. Able to follow some basic commands such as hand squeeze.  Only says 1-2 words.        Objective     Physical Exam  Vitals reviewed.   Constitutional:       General: She is not in acute distress.     Appearance: Normal appearance. She is not ill-appearing, toxic-appearing or diaphoretic.   HENT:      Head: Normocephalic.      Nose: Nose normal.   Eyes:      General: Lids are normal. Gaze aligned appropriately.   Cardiovascular:      Rate and Rhythm: Normal rate and regular rhythm.      Pulses: Normal pulses.      Heart sounds: Normal heart sounds. No murmur heard.  Pulmonary:      Effort: Pulmonary effort is normal.      Breath sounds: Normal air entry. Decreased breath sounds present.   Abdominal:      General: Abdomen is flat. Bowel sounds are normal.      Palpations: Abdomen is soft.      Tenderness: There is no abdominal tenderness.   Musculoskeletal:         General: Normal range of motion.      Cervical back: Full passive range of motion without pain and normal range of motion.      Right lower le+ Edema present.      Left lower le+ Edema present.   Skin:     General: Skin is warm and dry.      Capillary Refill: Capillary refill takes less than 2 seconds.   Neurological:      General: No focal deficit present.      Mental Status: She is alert. She is disoriented.      Motor: Motor function is intact.      Coordination: Coordination is intact.      Comments: Oriented x 1.  Speaks only 1-2 words.   Squeezes hands on commands.  Otherwise not interactive.    Psychiatric:         Attention and Perception: Attention normal.         Mood and Affect: Mood normal.         Speech: Speech normal.         Behavior: Behavior normal. Behavior is cooperative.         Last Recorded Vitals  Blood pressure (!) 186/101, pulse 99, temperature 36.4 °C (97.5 °F), temperature source Temporal, resp. rate  "18, height 1.473 m (4' 10\"), weight 54.2 kg (119 lb 7.8 oz), SpO2 94%.  Intake/Output last 3 Shifts:  I/O last 3 completed shifts:  In: 503.8 (9.3 mL/kg) [I.V.:403.8 (7.4 mL/kg); IV Piggyback:100]  Out: - (0 mL/kg)   Weight: 54.2 kg     Relevant Results            Scheduled medications  amLODIPine, 10 mg, oral, q AM  aspirin, 81 mg, oral, Daily  atorvastatin, 40 mg, oral, Nightly  busPIRone, 10 mg, oral, TID  docusate sodium, 100 mg, oral, BID  [Held by provider] enoxaparin, 40 mg, subcutaneous, q24h  [Held by provider] furosemide, 40 mg, oral, Daily  iohexol, 85 mL, intravenous, Once in imaging  lisinopril, 20 mg, oral, Daily  lubricating eye drops, 2 drop, Both Eyes, BID  mirtazapine, 15 mg, oral, q PM  pantoprazole, 40 mg, oral, Daily before breakfast   Or  pantoprazole, 40 mg, intravenous, Daily before breakfast  piperacillin-tazobactam, 2.25 g, intravenous, q6h  risperiDONE, 0.25 mg, oral, BID  Tc-99m-albumin, 5.7 millicurie, intravenous, Once in imaging      Continuous medications     PRN medications  PRN medications: acetaminophen, hydrALAZINE, melatonin, ondansetron, polyethylene glycol, risperiDONE     Results for orders placed or performed during the hospital encounter of 05/07/24 (from the past 24 hour(s))   Blood Culture    Specimen: Peripheral Venipuncture; Blood culture   Result Value Ref Range    Blood Culture Loaded on Instrument - Culture in progress    Blood Culture    Specimen: Peripheral Venipuncture; Blood culture   Result Value Ref Range    Blood Culture Loaded on Instrument - Culture in progress    Basic metabolic panel   Result Value Ref Range    Glucose 128 (H) 74 - 99 mg/dL    Sodium 137 136 - 145 mmol/L    Potassium 4.6 3.5 - 5.3 mmol/L    Chloride 106 98 - 107 mmol/L    Bicarbonate 24 21 - 32 mmol/L    Anion Gap 12 10 - 20 mmol/L    Urea Nitrogen 16 6 - 23 mg/dL    Creatinine 1.15 (H) 0.50 - 1.05 mg/dL    eGFR 44 (L) >60 mL/min/1.73m*2    Calcium 8.1 (L) 8.6 - 10.3 mg/dL   Urinalysis with " Reflex Culture and Microscopic   Result Value Ref Range    Color, Urine Orange (N) Light-Yellow, Yellow, Dark-Yellow    Appearance, Urine Ex.Turbid (N) Clear    Specific Gravity, Urine 1.035 1.005 - 1.035    pH, Urine 6.0 5.0, 5.5, 6.0, 6.5, 7.0, 7.5, 8.0    Protein, Urine 10 (TRACE) NEGATIVE, 10 (TRACE), 20 (TRACE) mg/dL    Glucose, Urine Normal Normal mg/dL    Blood, Urine 0.1 (1+) (A) NEGATIVE    Ketones, Urine TRACE (A) NEGATIVE mg/dL    Bilirubin, Urine NEGATIVE NEGATIVE    Urobilinogen, Urine 3 (1+) (A) Normal mg/dL    Nitrite, Urine NEGATIVE NEGATIVE    Leukocyte Esterase, Urine 250 Blanca/µL (A) NEGATIVE   Microscopic Only, Urine   Result Value Ref Range    WBC, Urine >50 (A) 1-5, NONE /HPF    RBC, Urine 11-20 (A) NONE, 1-2, 3-5 /HPF    Bacteria, Urine 4+ (A) NONE SEEN /HPF   CBC   Result Value Ref Range    WBC 13.7 (H) 4.4 - 11.3 x10*3/uL    nRBC 0.0 0.0 - 0.0 /100 WBCs    RBC 3.59 (L) 4.00 - 5.20 x10*6/uL    Hemoglobin 12.0 12.0 - 16.0 g/dL    Hematocrit 37.3 36.0 - 46.0 %     (H) 80 - 100 fL    MCH 33.4 26.0 - 34.0 pg    MCHC 32.2 32.0 - 36.0 g/dL    RDW 14.8 (H) 11.5 - 14.5 %    Platelets 172 150 - 450 x10*3/uL   Magnesium   Result Value Ref Range    Magnesium 1.80 1.60 - 2.40 mg/dL   Coagulation Screen   Result Value Ref Range    Protime 18.0 (H) 9.8 - 12.8 seconds    INR 1.6 (H) 0.9 - 1.1    aPTT 38 27 - 38 seconds   Comprehensive Metabolic Panel   Result Value Ref Range    Glucose 126 (H) 74 - 99 mg/dL    Sodium 137 136 - 145 mmol/L    Potassium 4.3 3.5 - 5.3 mmol/L    Chloride 107 98 - 107 mmol/L    Bicarbonate 23 21 - 32 mmol/L    Anion Gap 11 10 - 20 mmol/L    Urea Nitrogen 16 6 - 23 mg/dL    Creatinine 1.14 (H) 0.50 - 1.05 mg/dL    eGFR 44 (L) >60 mL/min/1.73m*2    Calcium 8.0 (L) 8.6 - 10.3 mg/dL    Albumin 2.9 (L) 3.4 - 5.0 g/dL    Alkaline Phosphatase 82 33 - 136 U/L    Total Protein 4.8 (L) 6.4 - 8.2 g/dL    AST 31 9 - 39 U/L    Bilirubin, Total 1.2 0.0 - 1.2 mg/dL    ALT 29 7 - 45 U/L    C-reactive protein   Result Value Ref Range    C-Reactive Protein 3.79 (H) <1.00 mg/dL     NM hepatobiliary    Result Date: 5/8/2024  Interpreted By:  Garett Ricks,  and Aurora Willett STUDY: NM HEPATOBILIARY;  5/8/2024 3:48 pm   INDICATION: Signs/Symptoms:possible acute cholecystitis.   COMPARISON: Ultrasound gallbladder from 05/07/2024 CT abdomen from 05/07/2024   ACCESSION NUMBER(S): [id number]   ORDERING CLINICIAN: ZOILA LONDON   TECHNIQUE: DIVISION OF NUCLEAR MEDICINE HEPATOBILIARY SCAN (HIDA)   The patient received an intravenous dose of  5.7 mCi of Tc-99m mebrofenin (Choletec).  Sequential images of the upper abdomen were then acquired over the next 120 minutes.   FINDINGS: There is prompt accumulation of activity within the liver and normal subsequent excretion via the biliary ductal system into the small bowel.  The gallbladder first visualizes at about  108 minutes after radiopharmaceutical injection and progressively fills.       Delayed filling of the gallbladder with no sign of cystic duct obstruction/acute cholecystitis.   I personally reviewed the images/study and I agree with the findings as stated by Brennon Benson MD (Radiology Resident). This study was interpreted at [place], [place].   MACRO: None   Signed by: Garett Ricks 5/8/2024 4:07 PM Dictation workstation:   [id number]    ECG 12 lead    Result Date: 5/8/2024  Normal sinus rhythm Anterolateral infarct (cited on or before 21-NOV-2014) Abnormal ECG When compared with ECG of 18-APR-2024 13:22, QRS axis Shifted right T wave amplitude has decreased in Anterior leads T wave inversion no longer evident in Lateral leads See ED provider note for full interpretation and clinical correlation Confirmed by Loretta Rodrigues ([id number]) on 5/8/2024 11:01:43 AM    US gallbladder    Result Date: 5/7/2024  Interpreted By:  Mushtaq Fitzgerald, STUDY: US GALLBLADDER    INDICATION: Signs/Symptoms:Abdominal pain.   ACCESSION NUMBER(S): [id number]   ORDERING CLINICIAN: BILLY BRISENO   TECHNIQUE: Real-time ultrasound right upper quadrant was performed.   FINDINGS: The study is limited due to inability of patient to be appropriately positioned and to cooperate.   The liver is normal in size and echogenicity without focal lesions. The gallbladder contains sludge and multiple mobile echogenic foci with posterior shadowing, consistent with gallstones. There is no mild gallbladder wall thickening/edema and positive sonographic Grimes's sign, suggestive of possible acute cholecystitis. The intra and extrahepatic biliary ducts are not dilated. The common bile duct measures up to 5 mm in caliber. The pancreas is unremarkable. The right kidney is normal. There is trace amount of free fluid.       Cholelithiasis and possible acute cholecystitis without biliary duct dilatation. Correlate clinically and if indicated confirm with hepatobiliary scan.     Signed by: Mushtaq Fitzgerald 5/7/2024 4:16 PM Dictation workstation:   [id number]    CT abdomen pelvis wo IV contrast    Result Date: 5/7/2024  Interpreted By:  Terence Ya, STUDY: CT ABDOMEN PELVIS WO IV CONTRAST;  5/7/2024 2:42 pm   INDICATION: 89 y/o   F with  Signs/Symptoms:Abdominal pain and distention.   LIMITATIONS: Evaluation of the solid organs is limited due to non use of IV contrast.   ACCESSION NUMBER(S): [id number]   ORDERING CLINICIAN: BILLY BRISENO   TECHNIQUE: Thin-section noncontrast spiral axial images were obtained from the xiphoid down through the symphysis pubis. Sagittal and coronal reconstruction images were generated. Bone, mediastinal, lung, and liver windows were reviewed.   COMPARISON: None.   FINDINGS: LUNG BASES: Cardiomegaly. No pericardial effusion. There are small bilateral pleural effusions, slightly larger on the right than on the left. There is associated atelectasis posteriorly in both lower lobes.   LIVER:  No hepatomegaly. Liver density was  within the limits of normal. No gross liver lesion in this unenhaced exam.   GALLBLADDER: No calcified stone in the gallbladder. No gallbladder dilatation. There is however mild nonspecific gallbladder wall thickening with subtle adjacent edema.   BILE DUCTS: No intrahepatic biliary ductal dilatation. Mild common bile duct dilatation up to 9 mm in diameter, tapering to normal distally.   SPLEEN: No splenomegaly. No gross splenic mass..   PANCREAS: No pancreatic mass or inflammation, or ductal dilatation.   KIDNEYS/ADRENALS: No adrenal mass or enlargement. Punctate nonobstructing upper pole left renal stone on axial image 55. No other calcified stone on either side. No hydronephrosis, mass, or perinephric edema in either kidney. No ureteral stone or dilatation.   BLADDER/PELVIS: Urinary bladder was grossly intact. Previous hysterectomy.  No gross adnexal mass.   GREAT VESSELS/RETROPERITONEUM: Moderate to advanced aortoiliac calcifications. Otherwise, the abdominal aorta and IVC were intact. No suspicious retroperitoneal adenopathy. There is mesenteric adenopathy in the lower right abdomen/upper right pelvis medial to the right colon and cecum. Lymph nodes measure up to 16 mm in diameter and there is infiltration of the mesenteric fat in this area. No suspicious pelvic or inguinal adenopathy.   PERITONEUM: Mild ascites adjacent to the liver and spleen. Trace fluid in the right pericolic gutter. Very mild free pelvic fluid. No pneumoperitoneum. No peritoneal or mesenteric mass or inflammation.   BOWEL: The stomach was grossly intact. There was no small bowel dilatation or small bowel wall thickening. No small-bowel obstruction. Sigmoid diverticulosis. Mild  left colon diverticulosis. Mild right colon diverticulosis. Very mild stool or gas in the right colon and transverse colon with mild stool and gas in the left colon down through the sigmoid with moderate stool in the rectum. There  was no colonic wall thickening or large bowel obstruction.  No edema adjacent to the colon. The cecal appendix could not be identified.   BONES: No destructive lytic or blastic bone lesion. Slight lumbar levoscoliosis. There is slight loss of height at T12, apparently remote. Moderate disc space narrowing with endplate spurring and vacuum disc phenomenon at L3-4. Trace anterolisthesis of L4 over L5 with mild endplate spurring and vacuum disc phenomenon at that level. Mild disc space narrowing with endplate spurring at L2-3. Moderate multilevel distal thoracic spine DJD. Interfacet hypertrophy with spur formation throughout the mid and distal lumbar spine. Sclerotic arthritic changes in both SI joints.   ABDOMINAL WALL: Unremarkable.       Small nonspecific bilateral pleural effusions with associated posterior bibasilar lung atelectasis.   Punctate nonobstructing stone in the upper pole of the left kidney.   Scattered colonic diverticulosis without acute associated inflammation.   Nonspecific mesenteric adenopathy with associated fat stranding medial to the cecum and right colon. Question mesenteric adenitis.   Nonspecific gallbladder wall thickening with mild adjacent edema but without calcified stone. Cannot exclude noncalcified gallstones. Recommend further evaluation with gallbladder ultrasound to exclude acute cholecystitis.   Mild nonspecific abdominal and pelvic ascites as described.   Previous hysterectomy.   Arthritic changes in the spine as described.   MACRO: None   Signed by: Terence Ya 5/7/2024 2:57 PM Dictation workstation:   [id number]                    Assessment/Plan     Juanis Cornejo is a 89 y.o. female, with a PMH of HTN, CKD, Alzheimer's dementia with behavioral disturbances, and TIA who is being admitted under observation status at [facility] on 5/7/2024 for RUQ pain and concern for acute cholecystitis.   Patient presented to the ED on 5/7/2024 for n/v, associated with RUQ abd pain  and distention. In the ED, VSS, EKG WNL. Labs notable for leukocytosis 15.5, K+ 5.5 [hemolyzed], renal fxn at baseline, mildly elevated AST but otherwise normal liver fxn, and no anemia. CT abd/pel revealed mesenteric adenitis and findings concerning for possible cholecystitis. RUQ US showed findings concerning for possible cholecystitis. Patient was given antiemetics, IV Zosyn, and admitted for further evaluation. Surgical consult and recommendations are pending. Blood cx were drawn prior to abx.    Abdominal pain, nausea with vomiting   Possible acute cholecystitis   - no abdominal pain or tenderness noted today   - general surgery recommending IR consult for perc curtis drain placement due to patient's advanced age and comorbidities; HIDA ordered to confirm diagnosis prior to any drain placement   - HIDA today to shows delayed gallbladder filling but was negative for acute cholecystitis. IR to follow tomorrow  - continue IV zosyn, follow urine cultures   - diet advanced this evening     UTI   - continue IV zosyn, follow urine cultures     HTN   - elevated this morning but better now   - continue home medications    CKD   - creatinine consistent with baseline     Alzheimer's dementia   - orientation protocol, sleep hygiene measures    TIA   - continue aspirin, statin     VTE/GI Prophylaxis   - lovenox held pending IR input   - bowel regimen in place     Discharge Planning   - plan at this time to return to assisted living facility when medically stable for discharge     Discussed with Ricrado Tolliver NP (IR)     This patient is upgraded to inpatient and care will transfer to Dr. Agarwal.          I spent 50 minutes in the professional and overall care of this patient.      Ita Winslow, APRN-CNP

## 2024-05-09 LAB
ALBUMIN SERPL BCP-MCNC: 2.8 G/DL (ref 3.4–5)
ALP SERPL-CCNC: 67 U/L (ref 33–136)
ALT SERPL W P-5'-P-CCNC: 24 U/L (ref 7–45)
ANION GAP SERPL CALC-SCNC: 12 MMOL/L (ref 10–20)
AST SERPL W P-5'-P-CCNC: 30 U/L (ref 9–39)
BACTERIA UR CULT: ABNORMAL
BILIRUB SERPL-MCNC: 0.9 MG/DL (ref 0–1.2)
BUN SERPL-MCNC: 17 MG/DL (ref 6–23)
CALCIUM SERPL-MCNC: 7.3 MG/DL (ref 8.6–10.3)
CHLORIDE SERPL-SCNC: 109 MMOL/L (ref 98–107)
CO2 SERPL-SCNC: 22 MMOL/L (ref 21–32)
CREAT SERPL-MCNC: 1.12 MG/DL (ref 0.5–1.05)
EGFRCR SERPLBLD CKD-EPI 2021: 45 ML/MIN/1.73M*2
ERYTHROCYTE [DISTWIDTH] IN BLOOD BY AUTOMATED COUNT: 14.8 % (ref 11.5–14.5)
GLUCOSE BLD MANUAL STRIP-MCNC: 96 MG/DL (ref 74–99)
GLUCOSE BLD MANUAL STRIP-MCNC: 98 MG/DL (ref 74–99)
GLUCOSE SERPL-MCNC: 98 MG/DL (ref 74–99)
HCT VFR BLD AUTO: 35.9 % (ref 36–46)
HGB BLD-MCNC: 11.2 G/DL (ref 12–16)
MAGNESIUM SERPL-MCNC: 1.9 MG/DL (ref 1.6–2.4)
MCH RBC QN AUTO: 33.7 PG (ref 26–34)
MCHC RBC AUTO-ENTMCNC: 31.2 G/DL (ref 32–36)
MCV RBC AUTO: 108 FL (ref 80–100)
NRBC BLD-RTO: 0 /100 WBCS (ref 0–0)
PLATELET # BLD AUTO: 160 X10*3/UL (ref 150–450)
POTASSIUM SERPL-SCNC: 3.7 MMOL/L (ref 3.5–5.3)
PROT SERPL-MCNC: 4.6 G/DL (ref 6.4–8.2)
RBC # BLD AUTO: 3.32 X10*6/UL (ref 4–5.2)
SODIUM SERPL-SCNC: 139 MMOL/L (ref 136–145)
WBC # BLD AUTO: 7.8 X10*3/UL (ref 4.4–11.3)

## 2024-05-09 PROCEDURE — 2500000005 HC RX 250 GENERAL PHARMACY W/O HCPCS: Performed by: NURSE PRACTITIONER

## 2024-05-09 PROCEDURE — C9113 INJ PANTOPRAZOLE SODIUM, VIA: HCPCS | Performed by: NURSE PRACTITIONER

## 2024-05-09 PROCEDURE — 99232 SBSQ HOSP IP/OBS MODERATE 35: CPT

## 2024-05-09 PROCEDURE — 92610 EVALUATE SWALLOWING FUNCTION: CPT | Mod: GN

## 2024-05-09 PROCEDURE — 85027 COMPLETE CBC AUTOMATED: CPT | Performed by: NURSE PRACTITIONER

## 2024-05-09 PROCEDURE — 2500000004 HC RX 250 GENERAL PHARMACY W/ HCPCS (ALT 636 FOR OP/ED): Performed by: FAMILY MEDICINE

## 2024-05-09 PROCEDURE — 83735 ASSAY OF MAGNESIUM: CPT | Performed by: NURSE PRACTITIONER

## 2024-05-09 PROCEDURE — 36415 COLL VENOUS BLD VENIPUNCTURE: CPT | Performed by: NURSE PRACTITIONER

## 2024-05-09 PROCEDURE — 80053 COMPREHEN METABOLIC PANEL: CPT | Performed by: NURSE PRACTITIONER

## 2024-05-09 PROCEDURE — 2500000004 HC RX 250 GENERAL PHARMACY W/ HCPCS (ALT 636 FOR OP/ED): Performed by: NURSE PRACTITIONER

## 2024-05-09 PROCEDURE — 1200000002 HC GENERAL ROOM WITH TELEMETRY DAILY

## 2024-05-09 PROCEDURE — 82947 ASSAY GLUCOSE BLOOD QUANT: CPT

## 2024-05-09 RX ORDER — MORPHINE SULFATE 2 MG/ML
1 INJECTION, SOLUTION INTRAMUSCULAR; INTRAVENOUS EVERY 4 HOURS PRN
Status: DISCONTINUED | OUTPATIENT
Start: 2024-05-09 | End: 2024-05-16 | Stop reason: HOSPADM

## 2024-05-09 RX ORDER — MORPHINE SULFATE 2 MG/ML
1 INJECTION, SOLUTION INTRAMUSCULAR; INTRAVENOUS ONCE
Status: COMPLETED | OUTPATIENT
Start: 2024-05-09 | End: 2024-05-09

## 2024-05-09 RX ORDER — DEXTROSE MONOHYDRATE AND SODIUM CHLORIDE 5; .9 G/100ML; G/100ML
75 INJECTION, SOLUTION INTRAVENOUS CONTINUOUS
Status: DISCONTINUED | OUTPATIENT
Start: 2024-05-09 | End: 2024-05-16 | Stop reason: HOSPADM

## 2024-05-09 RX ADMIN — PIPERACILLIN SODIUM AND TAZOBACTAM SODIUM 2.25 G: 2; .25 INJECTION, SOLUTION INTRAVENOUS at 00:11

## 2024-05-09 RX ADMIN — MORPHINE SULFATE 1 MG: 2 INJECTION, SOLUTION INTRAMUSCULAR; INTRAVENOUS at 13:11

## 2024-05-09 RX ADMIN — DEXTROSE AND SODIUM CHLORIDE 75 ML/HR: 5; 900 INJECTION, SOLUTION INTRAVENOUS at 21:01

## 2024-05-09 RX ADMIN — CARBOXYMETHYLCELLULOSE SODIUM 2 DROP: 0.5 SOLUTION/ DROPS OPHTHALMIC at 10:54

## 2024-05-09 RX ADMIN — PIPERACILLIN SODIUM AND TAZOBACTAM SODIUM 2.25 G: 2; .25 INJECTION, SOLUTION INTRAVENOUS at 11:44

## 2024-05-09 RX ADMIN — PIPERACILLIN SODIUM AND TAZOBACTAM SODIUM 2.25 G: 2; .25 INJECTION, SOLUTION INTRAVENOUS at 06:29

## 2024-05-09 RX ADMIN — CARBOXYMETHYLCELLULOSE SODIUM 2 DROP: 0.5 SOLUTION/ DROPS OPHTHALMIC at 21:09

## 2024-05-09 RX ADMIN — MORPHINE SULFATE 1 MG: 2 INJECTION, SOLUTION INTRAMUSCULAR; INTRAVENOUS at 21:00

## 2024-05-09 RX ADMIN — PANTOPRAZOLE SODIUM 40 MG: 40 INJECTION, POWDER, FOR SOLUTION INTRAVENOUS at 06:18

## 2024-05-09 RX ADMIN — PIPERACILLIN SODIUM AND TAZOBACTAM SODIUM 2.25 G: 2; .25 INJECTION, SOLUTION INTRAVENOUS at 18:03

## 2024-05-09 ASSESSMENT — PAIN - FUNCTIONAL ASSESSMENT
PAIN_FUNCTIONAL_ASSESSMENT: PAINAD (PAIN ASSESSMENT IN ADVANCED DEMENTIA SCALE)
PAIN_FUNCTIONAL_ASSESSMENT: PAINAD (PAIN ASSESSMENT IN ADVANCED DEMENTIA SCALE)

## 2024-05-09 ASSESSMENT — PAIN SCALES - PAIN ASSESSMENT IN ADVANCED DEMENTIA (PAINAD)
FACIALEXPRESSION: FACIAL GRIMACING
TOTALSCORE: 6
BODYLANGUAGE: TENSE, DISTRESSED PACING, FIDGETING
NEGVOCALIZATION: REPEATED TROUBLED CALLING OUT, LOUD MOANING/GROANING, CRYING
CONSOLABILITY: DISTRACTED OR REASSURED BY VOICE/TOUCH
BREATHING: NORMAL

## 2024-05-09 ASSESSMENT — PAIN SCALES - WONG BAKER: WONGBAKER_NUMERICALRESPONSE: HURTS WHOLE LOT

## 2024-05-09 ASSESSMENT — PAIN SCALES - GENERAL
PAINLEVEL_OUTOF10: 0 - NO PAIN

## 2024-05-09 NOTE — CONSULTS
Nutrition Assessment Note    Reason for Assessment  Reason for Assessment: Admission nursing screening    Pt admitted for:  Acute cholecystitis [K81.0]  Cholecystitis [K81.9]  Epigastric pain [R10.13]    MST triggered for unsure for weight loss.  No weight loss noted in weight history.  Chart reviewed and attempted to visit pt however in pt care at time of attempt.  Per SLP NPO starting today until MBSS completed due to high risk of aspiration.    Past Medical History:   Diagnosis Date    Alzheimer's dementia (Multi) 05/07/2024    Anxiety 05/07/2024    Delusional disorder (Multi) 05/07/2024    Hemochromatosis, unspecified     Hypertension 05/07/2024    Stage 3a chronic kidney disease (Multi) 05/07/2024    TIA (transient ischemic attack) 04/18/2024     Results for orders placed or performed during the hospital encounter of 05/07/24 (from the past 24 hour(s))   CBC   Result Value Ref Range    WBC 7.8 4.4 - 11.3 x10*3/uL    nRBC 0.0 0.0 - 0.0 /100 WBCs    RBC 3.32 (L) 4.00 - 5.20 x10*6/uL    Hemoglobin 11.2 (L) 12.0 - 16.0 g/dL    Hematocrit 35.9 (L) 36.0 - 46.0 %     (H) 80 - 100 fL    MCH 33.7 26.0 - 34.0 pg    MCHC 31.2 (L) 32.0 - 36.0 g/dL    RDW 14.8 (H) 11.5 - 14.5 %    Platelets 160 150 - 450 x10*3/uL   Magnesium   Result Value Ref Range    Magnesium 1.90 1.60 - 2.40 mg/dL   Comprehensive Metabolic Panel   Result Value Ref Range    Glucose 98 74 - 99 mg/dL    Sodium 139 136 - 145 mmol/L    Potassium 3.7 3.5 - 5.3 mmol/L    Chloride 109 (H) 98 - 107 mmol/L    Bicarbonate 22 21 - 32 mmol/L    Anion Gap 12 10 - 20 mmol/L    Urea Nitrogen 17 6 - 23 mg/dL    Creatinine 1.12 (H) 0.50 - 1.05 mg/dL    eGFR 45 (L) >60 mL/min/1.73m*2    Calcium 7.3 (L) 8.6 - 10.3 mg/dL    Albumin 2.8 (L) 3.4 - 5.0 g/dL    Alkaline Phosphatase 67 33 - 136 U/L    Total Protein 4.6 (L) 6.4 - 8.2 g/dL    AST 30 9 - 39 U/L    Bilirubin, Total 0.9 0.0 - 1.2 mg/dL    ALT 24 7 - 45 U/L   POCT GLUCOSE   Result Value Ref Range    POCT  "Glucose 96 74 - 99 mg/dL     Scheduled medications  amLODIPine, 10 mg, oral, q AM  aspirin, 81 mg, oral, Daily  atorvastatin, 40 mg, oral, Nightly  busPIRone, 10 mg, oral, TID  docusate sodium, 100 mg, oral, BID  [Held by provider] enoxaparin, 40 mg, subcutaneous, q24h  [Held by provider] furosemide, 40 mg, oral, Daily  iohexol, 85 mL, intravenous, Once in imaging  lisinopril, 20 mg, oral, Daily  lubricating eye drops, 2 drop, Both Eyes, BID  mirtazapine, 15 mg, oral, q PM  pantoprazole, 40 mg, oral, Daily before breakfast   Or  pantoprazole, 40 mg, intravenous, Daily before breakfast  piperacillin-tazobactam, 2.25 g, intravenous, q6h  risperiDONE, 0.25 mg, oral, BID  Tc-99m-albumin, 5.7 millicurie, intravenous, Once in imaging      Continuous medications     PRN medications  PRN medications: acetaminophen, hydrALAZINE, melatonin, ondansetron, polyethylene glycol, risperiDONE  Dietary Orders (From admission, onward)       Start     Ordered    05/09/24 1045  NPO Diet; Effective now  Diet effective now         05/09/24 1045                  Anthropometrics:  Height: 147.3 cm (4' 10\")  Weight: 54.2 kg (119 lb 7.8 oz)  BMI (Calculated): 24.98    Wt Readings from Last 2 Encounters:   05/07/24 54.2 kg (119 lb 7.8 oz)   04/18/24 46.5 kg (102 lb 8.2 oz)     Weight Change  Significant Weight Loss: No    Estimated Energy Needs  Total Energy Estimated Needs (kCal): 1355 kCal  Total Estimated Energy Need per Day (kCal/kg): 1625 kCal/kg  Method for Estimating Needs: 25-30    Estimated Protein Needs  Total Protein Estimated Needs (g): 45 g  Total Protein Estimated Needs (g/kg): 55 g/kg  Method for Estimating Needs: 0.8-1.0    Estimated Fluid Needs  Method for Estimating Needs: 1ml/kcal or per MD    Nutrition Focused Physical Findings:  Deferred: Pt unavailable. Will attempt upon follow up.    Edema  Edema: +1 trace  Edema Location: RLE    Physical Findings (Nutrition Deficiency/Toxicity)  Skin: Positive (bruising)     Nutrition " Diagnosis   Patient has Nutrition Diagnosis: Yes  Nutrition Diagnosis 1: Inadequate oral intake  Diagnosis Status (1): New  Related to (1): acute illness  As Evidenced by (1): pt NPO until MBSS completed      Nutrition Interventions/Recommendations   Nutrition Prescription  Individualized Nutrition Prescription Provided for : Pt to be NPO/CLD< 5 days.  Should pt require to be NPO/CLD > 5 days, consider alternate route for nutrition.    Coordination of Nutrition Care by a Nutrition Professional  Collaboration and Referral of Nutrition Care: Collaboration by nutrition professional with other providers    Education Documentation  No documentation found.      Nutrition Monitoring and Evaluation   Food and Nutrient Related History  Energy Intake: Estimated energy intake    Fluid Intake: Estimated fluid intake    Amount of Food: Estimated amout of food, Medical food intake    Mealtime Behavior: Limited number of accepted foods    Anthropometrics: Body Composition/Growth/Weight History  Weight Change: Weight gain, Weight loss    Biochemical Data, Medical Tests and Procedures  Electrolyte and Renal Panel: Other (Comment)  Criteria: as clinically indicated    Gastrointestinal Profile: Other (Comment)  Criteria: as clinically indicated    Glucose/Endocrine Profile: Other (Comment)  Criteria: as clinically indicated    Nutritional Anemia Profile: Other (Comment)  Criteria: as clinically indicated    Vitamin Profile: Other (Comment)  Criteria: as clinically indicated    Nutrition Focused Physical Findings  Other: Fluid Accumulation, Stool output, Urine volume, Overall appearance    Follow Up  Time Spent (min): 60 minutes  Last Date of Nutrition Visit: 05/09/24  Nutrition Follow-Up Needed?: Dietitian to reassess per policy  Follow up Comment: RASHID SCOTT

## 2024-05-09 NOTE — PROGRESS NOTES
Speech-Language Pathology    [facility] Adult Inpatient Speech-Language Pathology Clinical Swallow Evaluation    Patient Name: Juanis Cornejo  MRN: [id number]  Today's Date: 5/9/2024   Time Calculation  Start Time: 1005  Stop Time: 1035  Time Calculation (min): 30 min         Current Problem:   1. Epigastric pain        2. Cholecystitis        3. Oropharyngeal dysphagia              Recommendations:  Risk for Aspiration: Yes  Additional Recommendations: NPO, Modified barium swallow study, Dysphagia treatment  Solid Diet Recommendations : NPO  Liquid Diet Recommendations: NPO  Medication Administration Recommendations: Non Oral  Follow up treatments: Diet tolerance monitoring, Patient/family education  Dysphagia Goals: Pt to participate in MBSS to r/o laryngeal aspiration and determine LRD      Assessment:  Assessment Results: Pt presents with suspected oral-pharyngeal dysphagia, overt s/s aspiration with thin liquids. MBSS indicated to r/o aspiration and determine least restrictive diet (LRD).  Medical Staff Made Aware: Yes  Strengths: Family/Caregiver Suppport  Barriers: Cognition, Comorbidities      Plan:  Inpatient/Swing Bed or Outpatient: Inpatient  Treatment/Interventions: Complete MBSS, Diet recommendations  SLP Plan: Skilled SLP  SLP Frequency:  (to be determined after completion of MBSS)  Discussed POC: Caregiver/family, Patient  Discussed Risks/Benefits: Yes  Patient/Caregiver Agreeable: Yes      Subjective   Current Problem:  Pt resting in bed with son present at bedside. Son reports that Pt probably on a regular diet at the facility, not aware of any diet modifications aside from occasionally needing to cut some foods bite-sized for her. Son reports no awareness of overt s/s aspiration or dysphagia in the past.  SLP fed Pt all PO trials; Pt made no attempts to feed herself although offered and encouraged several times. Pt declined food items from breakfast tray prior to this evaluation, but accepted pudding and juice  "trials with SLP.      General Visit Information:  Patient Class: Inpatient  Living Environment:  ([facility])  Caregiver Feedback: Son present at bedside, provided hx and supportive  Reason for Referral: Swallow Eval (\"Pt coughs on thin liquids\")  Referred By: Diana  Past Medical History Relevant to Rehab: HTN, CKD3, Dementia with behavioral disturbances, TIA  Patient Seen During This Visit: Yes  Prior to Session Communication: Bedside nurse  Date of Order: 05/08/24  Current Diet : low fiber        Objective     Baseline Assessment:  Respiratory Status: Room air  Behavior/Cognition: Alert, Pleasant mood, Cooperative  Hearing: Within Functional Limits  Patient Positioning: Upright in Bed  Baseline Vocal Quality: Normal      Pain:  Pain Score: 0 - No pain       Oral/Motor Assessment:  Oral Hygiene: Dry lips, oral mucosa noted. Oral care provided prior to PO trials, with mild residue noted throughout oral cavity, particularly upper denture.  Dentition: Dentures (Upper Full) (lower edentulous)  Oral Motor: Within Functional Limits  Intelligibility: Intelligible  Breath Support: Adequate for speech  Hearing: Within Functional Limits      Consistencies Trialed:  Consistencies Trialed: Yes  Pt trialed 3x 1/2tsp thin liquid by spoon, 2x small sip by cup, approx 2oz pudding,1/2tsp Mildly/Nectar-thick liquids by spoon.      Clinical Observations:  Management of Oral Secretions: Adequate  Signs/Symptoms of Aspiration: Cough, Watery Eyes, Wet Vocal Quality, Change in Facial Color  Immediate Cough: Thin (IDDSI Level 0) - Cup (x1) (final sip of thin liquid)      Education:   Son reported comprehension of all information discussed, including recommendation for MBSS, diet, and aspiration risks/precautions.      "

## 2024-05-09 NOTE — PROGRESS NOTES
"Juanis Cornejo is a 89 y.o. female on day 1 of admission presenting with RUQ abdominal pain.    Subjective   More alert today, taking PO fluids. Son at bedside, discussed that there was no role for a percutaneous cholecystostomy tube at this time.        Objective     Physical Exam  Constitutional:       General: She is not in acute distress.  Cardiovascular:      Rate and Rhythm: Normal rate and regular rhythm.   Pulmonary:      Effort: Pulmonary effort is normal. No respiratory distress.      Breath sounds: WNL  Abdominal:      General: Abdomen is flat. Bowel sounds are normal. There is no distension.      Palpations: Abdomen is soft, nontender, BS+  Neurological:      Mental Status: Pleasantly confused    Last Recorded Vitals  Blood pressure 144/57, pulse 53, temperature 36.9 °C (98.5 °F), resp. rate 18, height 1.473 m (4' 10\"), weight 54.2 kg (119 lb 7.8 oz), SpO2 94%.  Intake/Output last 3 Shifts:  I/O last 3 completed shifts:  In: 663.8 (12.2 mL/kg) [I.V.:563.8 (10.4 mL/kg); IV Piggyback:100]  Out: 150 (2.8 mL/kg) [Urine:150 (0.1 mL/kg/hr)]  Weight: 54.2 kg     Relevant Results              Scheduled medications  amLODIPine, 10 mg, oral, q AM  aspirin, 81 mg, oral, Daily  atorvastatin, 40 mg, oral, Nightly  busPIRone, 10 mg, oral, TID  docusate sodium, 100 mg, oral, BID  [Held by provider] enoxaparin, 40 mg, subcutaneous, q24h  [Held by provider] furosemide, 40 mg, oral, Daily  iohexol, 85 mL, intravenous, Once in imaging  lisinopril, 20 mg, oral, Daily  lubricating eye drops, 2 drop, Both Eyes, BID  mirtazapine, 15 mg, oral, q PM  pantoprazole, 40 mg, oral, Daily before breakfast   Or  pantoprazole, 40 mg, intravenous, Daily before breakfast  piperacillin-tazobactam, 2.25 g, intravenous, q6h  risperiDONE, 0.25 mg, oral, BID  Tc-99m-albumin, 5.7 millicurie, intravenous, Once in imaging      Continuous medications     PRN medications  PRN medications: acetaminophen, hydrALAZINE, melatonin, ondansetron, polyethylene " glycol, risperiDONE  Results for orders placed or performed during the hospital encounter of 05/07/24 (from the past 24 hour(s))   CBC   Result Value Ref Range    WBC 7.8 4.4 - 11.3 x10*3/uL    nRBC 0.0 0.0 - 0.0 /100 WBCs    RBC 3.32 (L) 4.00 - 5.20 x10*6/uL    Hemoglobin 11.2 (L) 12.0 - 16.0 g/dL    Hematocrit 35.9 (L) 36.0 - 46.0 %     (H) 80 - 100 fL    MCH 33.7 26.0 - 34.0 pg    MCHC 31.2 (L) 32.0 - 36.0 g/dL    RDW 14.8 (H) 11.5 - 14.5 %    Platelets 160 150 - 450 x10*3/uL   Magnesium   Result Value Ref Range    Magnesium 1.90 1.60 - 2.40 mg/dL   Comprehensive Metabolic Panel   Result Value Ref Range    Glucose 98 74 - 99 mg/dL    Sodium 139 136 - 145 mmol/L    Potassium 3.7 3.5 - 5.3 mmol/L    Chloride 109 (H) 98 - 107 mmol/L    Bicarbonate 22 21 - 32 mmol/L    Anion Gap 12 10 - 20 mmol/L    Urea Nitrogen 17 6 - 23 mg/dL    Creatinine 1.12 (H) 0.50 - 1.05 mg/dL    eGFR 45 (L) >60 mL/min/1.73m*2    Calcium 7.3 (L) 8.6 - 10.3 mg/dL    Albumin 2.8 (L) 3.4 - 5.0 g/dL    Alkaline Phosphatase 67 33 - 136 U/L    Total Protein 4.6 (L) 6.4 - 8.2 g/dL    AST 30 9 - 39 U/L    Bilirubin, Total 0.9 0.0 - 1.2 mg/dL    ALT 24 7 - 45 U/L   POCT GLUCOSE   Result Value Ref Range    POCT Glucose 96 74 - 99 mg/dL     NM hepatobiliary    Result Date: 5/8/2024  Interpreted By:  Garett Ricks and Bartolomei Aguilar Christopher STUDY: NM HEPATOBILIARY;  5/8/2024 3:48 pm   INDICATION: Signs/Symptoms:possible acute cholecystitis.   COMPARISON: Ultrasound gallbladder from 05/07/2024 CT abdomen from 05/07/2024   ACCESSION NUMBER(S): [id number]   ORDERING CLINICIAN: ZOILA LONDON   TECHNIQUE: DIVISION OF NUCLEAR MEDICINE HEPATOBILIARY SCAN (HIDA)   The patient received an intravenous dose of  5.7 mCi of Tc-99m mebrofenin (Choletec).  Sequential images of the upper abdomen were then acquired over the next 120 minutes.   FINDINGS: There is prompt accumulation of activity within the liver and normal subsequent excretion via the  biliary ductal system into the small bowel.  The gallbladder first visualizes at about  108 minutes after radiopharmaceutical injection and progressively fills.       Delayed filling of the gallbladder with no sign of cystic duct obstruction/acute cholecystitis.   I personally reviewed the images/study and I agree with the findings as stated by Brennon Benson MD (Radiology Resident). This study was interpreted at [place], [place].   MACRO: None   Signed by: Garett Ricks 5/8/2024 4:07 PM Dictation workstation:   [id number]    ECG 12 lead    Result Date: 5/8/2024  Normal sinus rhythm Anterolateral infarct (cited on or before 21-NOV-2014) Abnormal ECG When compared with ECG of 18-APR-2024 13:22, QRS axis Shifted right T wave amplitude has decreased in Anterior leads T wave inversion no longer evident in Lateral leads See ED provider note for full interpretation and clinical correlation Confirmed by Loretta Rodrigues ([id number]) on 5/8/2024 11:01:43 AM    US gallbladder    Result Date: 5/7/2024  Interpreted By:  Mushtaq Fitzgerald, STUDY: US GALLBLADDER   INDICATION: Signs/Symptoms:Abdominal pain.   ACCESSION NUMBER(S): [id number]   ORDERING CLINICIAN: BILLY BRISENO   TECHNIQUE: Real-time ultrasound right upper quadrant was performed.   FINDINGS: The study is limited due to inability of patient to be appropriately positioned and to cooperate.   The liver is normal in size and echogenicity without focal lesions. The gallbladder contains sludge and multiple mobile echogenic foci with posterior shadowing, consistent with gallstones. There is no mild gallbladder wall thickening/edema and positive sonographic Grimes's sign, suggestive of possible acute cholecystitis. The intra and extrahepatic biliary ducts are not dilated. The common bile duct measures up to 5 mm in caliber. The pancreas is unremarkable. The right kidney is normal. There is trace amount of free  fluid.       Cholelithiasis and possible acute cholecystitis without biliary duct dilatation. Correlate clinically and if indicated confirm with hepatobiliary scan.     Signed by: Mushtaq Fitzgerald 5/7/2024 4:16 PM Dictation workstation:   [id number]    CT abdomen pelvis wo IV contrast    Result Date: 5/7/2024  Interpreted By:  Terence Ya, STUDY: CT ABDOMEN PELVIS WO IV CONTRAST;  5/7/2024 2:42 pm   INDICATION: 89 y/o   F with  Signs/Symptoms:Abdominal pain and distention.   LIMITATIONS: Evaluation of the solid organs is limited due to non use of IV contrast.   ACCESSION NUMBER(S): [id number]   ORDERING CLINICIAN: BILLY BRISENO   TECHNIQUE: Thin-section noncontrast spiral axial images were obtained from the xiphoid down through the symphysis pubis. Sagittal and coronal reconstruction images were generated. Bone, mediastinal, lung, and liver windows were reviewed.   COMPARISON: None.   FINDINGS: LUNG BASES: Cardiomegaly. No pericardial effusion. There are small bilateral pleural effusions, slightly larger on the right than on the left. There is associated atelectasis posteriorly in both lower lobes.   LIVER: No hepatomegaly. Liver density was  within the limits of normal. No gross liver lesion in this unenhaced exam.   GALLBLADDER: No calcified stone in the gallbladder. No gallbladder dilatation. There is however mild nonspecific gallbladder wall thickening with subtle adjacent edema.   BILE DUCTS: No intrahepatic biliary ductal dilatation. Mild common bile duct dilatation up to 9 mm in diameter, tapering to normal distally.   SPLEEN: No splenomegaly. No gross splenic mass..   PANCREAS: No pancreatic mass or inflammation, or ductal dilatation.   KIDNEYS/ADRENALS: No adrenal mass or enlargement. Punctate nonobstructing upper pole left renal stone on axial image 55. No other calcified stone on either side. No hydronephrosis, mass, or perinephric edema in either kidney. No ureteral stone or dilatation.    BLADDER/PELVIS: Urinary bladder was grossly intact. Previous hysterectomy.  No gross adnexal mass.   GREAT VESSELS/RETROPERITONEUM: Moderate to advanced aortoiliac calcifications. Otherwise, the abdominal aorta and IVC were intact. No suspicious retroperitoneal adenopathy. There is mesenteric adenopathy in the lower right abdomen/upper right pelvis medial to the right colon and cecum. Lymph nodes measure up to 16 mm in diameter and there is infiltration of the mesenteric fat in this area. No suspicious pelvic or inguinal adenopathy.   PERITONEUM: Mild ascites adjacent to the liver and spleen. Trace fluid in the right pericolic gutter. Very mild free pelvic fluid. No pneumoperitoneum. No peritoneal or mesenteric mass or inflammation.   BOWEL: The stomach was grossly intact. There was no small bowel dilatation or small bowel wall thickening. No small-bowel obstruction. Sigmoid diverticulosis. Mild  left colon diverticulosis. Mild right colon diverticulosis. Very mild stool or gas in the right colon and transverse colon with mild stool and gas in the left colon down through the sigmoid with moderate stool in the rectum. There was no colonic wall thickening or large bowel obstruction.  No edema adjacent to the colon. The cecal appendix could not be identified.   BONES: No destructive lytic or blastic bone lesion. Slight lumbar levoscoliosis. There is slight loss of height at T12, apparently remote. Moderate disc space narrowing with endplate spurring and vacuum disc phenomenon at L3-4. Trace anterolisthesis of L4 over L5 with mild endplate spurring and vacuum disc phenomenon at that level. Mild disc space narrowing with endplate spurring at L2-3. Moderate multilevel distal thoracic spine DJD. Interfacet hypertrophy with spur formation throughout the mid and distal lumbar spine. Sclerotic arthritic changes in both SI joints.   ABDOMINAL WALL: Unremarkable.       Small nonspecific bilateral pleural effusions with  associated posterior bibasilar lung atelectasis.   Punctate nonobstructing stone in the upper pole of the left kidney.   Scattered colonic diverticulosis without acute associated inflammation.   Nonspecific mesenteric adenopathy with associated fat stranding medial to the cecum and right colon. Question mesenteric adenitis.   Nonspecific gallbladder wall thickening with mild adjacent edema but without calcified stone. Cannot exclude noncalcified gallstones. Recommend further evaluation with gallbladder ultrasound to exclude acute cholecystitis.   Mild nonspecific abdominal and pelvic ascites as described.   Previous hysterectomy.   Arthritic changes in the spine as described.   MACRO: None   Signed by: Terence Ya 5/7/2024 2:57 PM Dictation workstation:   [id number]    Transthoracic Echo (TTE) Complete    Addendum Date: 4/20/2024    Bubble study was performed.  No clear PFO or shunt. [id number] Vincent Hanson MD Electronically Amended 4/20/2024, 11:21 AM Final (Amended)     Result Date: 4/20/2024   [facility], [address] [place] [address]              Tel [phone] and Fax [phone] TRANSTHORACIC ECHOCARDIOGRAM REPORT  Patient Name:      ANTONY GARCIASCOTT Jenkins Physician:    [id number] Raheel Gonzalez MD Study Date:        4/19/2024           Ordering Provider:    [id number] LAVINIA MANRIQUE MRN/PID:           [id number]            Fellow: Accession#:        [id number]        Nurse: Date of Birth/Age: 6/15/1928 / 89      Sonographer:          Shaq Guan RDCS                    years Gender:            F                   Additional Staff: Height:            156.00 cm           Admit Date: Weight:            46.50 kg            Admission Status:     Observation -                                                              Priority discharge BSA / BMI:         1.43  m2 / 19.11     Encounter#:           [id number]                    kg/m2                                        Department Location:  [facility] Non                                                              Invasive Blood Pressure: 116 /48 mmHg Study Type:    TRANSTHORACIC ECHO (TTE) COMPLETE Diagnosis/ICD: 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 Study Detail: The following Echo studies were performed: 2D, M-Mode, Doppler and               color flow. Image quality for this study is suboptimal.               Technically challenging study due to poor acoustic windows and               patient lying in supine position.  PHYSICIAN INTERPRETATION: Left Ventricle: The left ventricular systolic function is hyperdynamic, with an estimated ejection fraction of 70-75%. There are no regional wall motion abnormalities. The left ventricular cavity size is decreased. Left ventricular diastolic filling was indeterminate. Left Atrium: The left atrium is normal in size. Right Ventricle: The right ventricle is normal in size. There is normal right ventricular global systolic function. Right Atrium: The right atrium was not assessed. Aortic Valve: The aortic valve is trileaflet. The aortic valve appears degenerative. There is mild to moderate aortic valve cusp calcification. There is trivial aortic valve regurgitation. The peak instantaneous gradient of the aortic valve is 17.1 mmHg. Mitral Valve: The mitral valve is mildly thickened. There is mild mitral annular calcification. There is trace mitral valve regurgitation. Tricuspid Valve: The tricuspid valve is structurally normal. No evidence of tricuspid regurgitation. Pulmonic Valve: The pulmonic valve is not well visualized. There is trace pulmonic valve regurgitation. Pericardium: There is no pericardial  effusion noted. Aorta: The aortic root is normal. Systemic Veins: The inferior vena cava was not well visualized. In comparison to the previous echocardiogram(s): There are no prior studies on this patient for comparison purposes.  CONCLUSIONS:  1. Left ventricular systolic function is hyperdynamic with a 70-75% estimated ejection fraction.  2. Left ventricular cavity size is decreased.  3. Image quality for this study is suboptimal. QUANTITATIVE DATA SUMMARY: 2D MEASUREMENTS:                          Normal Ranges: LAs:           2.40 cm   (2.7-4.0cm) IVSd:          0.92 cm   (0.6-1.1cm) LVPWd:         0.82 cm   (0.6-1.1cm) LVIDd:         3.13 cm   (3.9-5.9cm) LVIDs:         1.43 cm LV Mass Index: 49.8 g/m2 LV % FS        54.3 % LA VOLUME:                              Normal Ranges: LA Vol A4C:        19.8 ml   (22+/-6mL/m2) LA Vol Index A4C:  13.9ml/m2 LA Area A4C:       9.9 cm2 LA Major Axis A4C: 4.2 cm AORTA MEASUREMENTS:                    Normal Ranges: Asc Ao, d: 2.10 cm (2.1-3.4cm) LV SYSTOLIC FUNCTION BY 2D PLANIMETRY (MOD):                     Normal Ranges: EF-A4C View: 63.8 % (>=55%) EF-A2C View: 73.1 % EF-Biplane:  68.5 % LV DIASTOLIC FUNCTION:                        Normal Ranges: MV Peak E:    1.16 m/s (0.7-1.2 m/s) MV Peak A:    1.72 m/s (0.42-0.7 m/s) E/A Ratio:    0.67     (1.0-2.2) MV lateral e' 0.05 m/s MV medial e'  0.05 m/s MITRAL VALVE:                       Normal Ranges: MV Vmax:    1.69 m/s  (<=1.3m/s) MV peak P.4 mmHg (<5mmHg) MV mean P.0 mmHg  (<2mmHg) MV DT:      156 msec  (150-240msec) AORTIC VALVE:                          Normal Ranges: AoV Vmax:      2.07 m/s  (<=1.7m/s) AoV Peak P.1 mmHg (<20mmHg) LVOT Max Ronni:  1.16 m/s  (<=1.1m/s) LVOT VTI:      24.60 cm LVOT Diameter: 1.90 cm   (1.8-2.4cm) AoV Area,Vmax: 1.59 cm2  (2.5-4.5cm2)  RIGHT VENTRICLE: TAPSE: 23.9 mm RV s'  0.15 m/s PULMONIC VALVE:                         Normal Ranges: PV Accel Time: 82 msec  (>120ms) PV  Max Ronni:    1.4 m/s  (0.6-0.9m/s) PV Max P.2 mmHg  [id number] Raheel Gonzalez MD Electronically signed on 2024 at 1:01:16 PM  ** Final **     MR brain wo IV contrast    Result Date: 2024  Interpreted By:  Ketan Diaz  and Diana Bloom STUDY: MR BRAIN WO IV CONTRAST; MR ANGIO NECK WO IV CONTRAST; MR ANGIO HEAD WO IV CONTRAST;  2024 9:59 pm   INDICATION: Signs/Symptoms:stroke like symptoms.   COMPARISON: Brain MRI 2013. CT brain dated 2024.   ACCESSION NUMBER(S): [id number]; [id number]; [id number]   ORDERING CLINICIAN: LAVINIA MANRIQUE   TECHNIQUE: Axial T2, FLAIR, DWI, gradient echo T2 and sagittal and coronal T1 weighted images of brain were acquired. Noncontrast time-of-flight MR angiogram of the zxizrz-vw-Adgavc vessels was obtained with MIP reformats. Noncontrast time of flight MR angiogram of the neck vessels was obtained with MIP reformats.   FINDINGS: Brain MRI: There is no evidence of acute infarction. There are no extra-axial collections. There is no mass lesion and no midline shift. There is no hydrocephalus. There is generalized cerebral volume loss and associated ventricular and sulcal enlargement slightly progressed since . Scattered periventricular and deep white matter FLAIR hyperintensities suggestive of mild to moderate chronic microvascular ischemic change is grossly similar since 2013. No evidence of intracranial hemorrhage.   Paranasal Sinuses and Mastoids: Visualized paranasal sinuses are well aerated. Trace amount of fluid in the caudal right mastoid air cells. Left mastoid air cells are clear. The orbits are grossly normal. Bilateral cataract surgeries.   There is a degenerative pannus in the retro odontoid region contributing to mild central canal stenosis at the level of C1 similar to prior.   MRA of the brain: Anterior circulation: The intracranial portions of the internal carotid, middle cerebral, and anterior cerebral arteries are  patent, without significant focal stenosis. There is a normal anterior communicating artery.   Posterior circulation: The intracranial portions of the right vertebral, basilar, and proximal posterior cerebral arteries are patent without focal stenosis. Slightly limited evaluation of the bilateral posterior cerebral arteries. There is lack of flow enhancement of the left vertebral artery with corresponding absent flow void within the left vertebral artery and there is only focal region of enhancement of the left vertebral artery near the vertebrobasilar junction, for example axial image 5 of 125. There is a question of intramural hematoma versus surrounding venous plexus artifact around the left upper cervical vertebral artery, axial image 41 through 37 of 41.,     MRA of the neck: Common carotid arteries: Limited evaluation. Left internal carotid: No significant stenosis. Right internal carotid: No significant stenosis. Cervical vertebral arteries: The right vertebral artery is widely patent without focal stenosis. 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       There is no evidence of acute hemorrhage, mass lesion or acute infarction. Slight progression of generalized cerebral volume loss.   There is age indeterminate occlusion of the left vertebral artery new since 2013 there is a question of surrounding hematoma around the upper cervical left vertebral artery, for example axial T2 image 41 of 41. 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 Further evaluation with CT angiogram of the neck and MRA neck dissection protocol is recommended.   Signed by: Ketan Diaz 4/20/2024 8:41 AM Dictation workstation:   [id number]    MR angio head wo IV contrast    Result Date: 4/20/2024  Interpreted By:  Ketan Diaz  and Diana Bloom STUDY: MR BRAIN WO IV CONTRAST; MR ANGIO NECK WO IV CONTRAST; MR ANGIO HEAD WO IV CONTRAST;  4/19/2024 9:59 pm   INDICATION:  Signs/Symptoms:stroke like symptoms.   COMPARISON: Brain MRI 09/09/2013. CT brain dated 04/18/2024.   ACCESSION NUMBER(S): [id number]; [id number]; [id number]   ORDERING CLINICIAN: LAVINIA MANRIQUE   TECHNIQUE: Axial T2, FLAIR, DWI, gradient echo T2 and sagittal and coronal T1 weighted images of brain were acquired. Noncontrast time-of-flight MR angiogram of the huispt-yw-Wokekf vessels was obtained with MIP reformats. Noncontrast time of flight MR angiogram of the neck vessels was obtained with MIP reformats.   FINDINGS: Brain MRI: There is no evidence of acute infarction. There are no extra-axial collections. There is no mass lesion and no midline shift. There is no hydrocephalus. There is generalized cerebral volume loss and associated ventricular and sulcal enlargement slightly progressed since 2013. Scattered periventricular and deep white matter FLAIR hyperintensities suggestive of mild to moderate chronic microvascular ischemic change is grossly similar since 2013. No evidence of intracranial hemorrhage.   Paranasal Sinuses and Mastoids: Visualized paranasal sinuses are well aerated. Trace amount of fluid in the caudal right mastoid air cells. Left mastoid air cells are clear. The orbits are grossly normal. Bilateral cataract surgeries.   There is a degenerative pannus in the retro odontoid region contributing to mild central canal stenosis at the level of C1 similar to prior.   MRA of the brain: Anterior circulation: The intracranial portions of the internal carotid, middle cerebral, and anterior cerebral arteries are patent, without significant focal stenosis. There is a normal anterior communicating artery.   Posterior circulation: The intracranial portions of the right vertebral, basilar, and proximal posterior cerebral arteries are patent without focal stenosis. Slightly limited evaluation of the bilateral posterior cerebral arteries. There is lack of flow enhancement of the left vertebral  artery with corresponding absent flow void within the left vertebral artery and there is only focal region of enhancement of the left vertebral artery near the vertebrobasilar junction, for example axial image 5 of 125. There is a question of intramural hematoma versus surrounding venous plexus artifact around the left upper cervical vertebral artery, axial image 41 through 37 of 41.,     MRA of the neck: Common carotid arteries: Limited evaluation. Left internal carotid: No significant stenosis. Right internal carotid: No significant stenosis. Cervical vertebral arteries: The right vertebral artery is widely patent without focal stenosis. 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       There is no evidence of acute hemorrhage, mass lesion or acute infarction. Slight progression of generalized cerebral volume loss.   There is age indeterminate occlusion of the left vertebral artery new since 2013 there is a question of surrounding hematoma around the upper cervical left vertebral artery, for example axial T2 image 41 of 41. 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 Further evaluation with CT angiogram of the neck and MRA neck dissection protocol is recommended.   Signed by: Ketan Diaz 4/20/2024 8:41 AM Dictation workstation:   [id number]    MR angio neck wo IV contrast    Result Date: 4/20/2024  Interpreted By:  Ketan Diaz  and Diana Bloom STUDY: MR BRAIN WO IV CONTRAST; MR ANGIO NECK WO IV CONTRAST; MR ANGIO HEAD WO IV CONTRAST;  4/19/2024 9:59 pm   INDICATION: Signs/Symptoms:stroke like symptoms.   COMPARISON: Brain MRI 09/09/2013. CT brain dated 04/18/2024.   ACCESSION NUMBER(S): [id number]; [id number]; [id number]   ORDERING CLINICIAN: LAVINIA MANRIQUE   TECHNIQUE: Axial T2, FLAIR, DWI, gradient echo T2 and sagittal and coronal T1 weighted images of brain were acquired. Noncontrast time-of-flight MR angiogram of the  izbrjn-sc-Wznwvg vessels was obtained with MIP reformats. Noncontrast time of flight MR angiogram of the neck vessels was obtained with MIP reformats.   FINDINGS: Brain MRI: There is no evidence of acute infarction. There are no extra-axial collections. There is no mass lesion and no midline shift. There is no hydrocephalus. There is generalized cerebral volume loss and associated ventricular and sulcal enlargement slightly progressed since 2013. Scattered periventricular and deep white matter FLAIR hyperintensities suggestive of mild to moderate chronic microvascular ischemic change is grossly similar since 2013. No evidence of intracranial hemorrhage.   Paranasal Sinuses and Mastoids: Visualized paranasal sinuses are well aerated. Trace amount of fluid in the caudal right mastoid air cells. Left mastoid air cells are clear. The orbits are grossly normal. Bilateral cataract surgeries.   There is a degenerative pannus in the retro odontoid region contributing to mild central canal stenosis at the level of C1 similar to prior.   MRA of the brain: Anterior circulation: The intracranial portions of the internal carotid, middle cerebral, and anterior cerebral arteries are patent, without significant focal stenosis. There is a normal anterior communicating artery.   Posterior circulation: The intracranial portions of the right vertebral, basilar, and proximal posterior cerebral arteries are patent without focal stenosis. Slightly limited evaluation of the bilateral posterior cerebral arteries. There is lack of flow enhancement of the left vertebral artery with corresponding absent flow void within the left vertebral artery and there is only focal region of enhancement of the left vertebral artery near the vertebrobasilar junction, for example axial image 5 of 125. There is a question of intramural hematoma versus surrounding venous plexus artifact around the left upper cervical vertebral artery, axial image 41 through 37  of 41.,     MRA of the neck: Common carotid arteries: Limited evaluation. Left internal carotid: No significant stenosis. Right internal carotid: No significant stenosis. Cervical vertebral arteries: The right vertebral artery is widely patent without focal stenosis. 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       There is no evidence of acute hemorrhage, mass lesion or acute infarction. Slight progression of generalized cerebral volume loss.   There is age indeterminate occlusion of the left vertebral artery new since 2013 there is a question of surrounding hematoma around the upper cervical left vertebral artery, for example axial T2 image 41 of 41. 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 Further evaluation with CT angiogram of the neck and MRA neck dissection protocol is recommended.   Signed by: Ketan Diaz 4/20/2024 8:41 AM Dictation workstation:   [id number]    ECG 12 lead    Result Date: 4/19/2024  Normal sinus rhythm Left axis deviation Minimal voltage criteria for LVH, may be normal variant ( [place] product ) Anterolateral infarct (cited on or before 21-NOV-2014) Abnormal ECG When compared with ECG of 21-NOV-2014 10:27, Significant changes have occurred See ED provider note for full interpretation and clinical correlation Confirmed by Brissa Naylor ([id number]) on 4/19/2024 9:58:58 AM    XR chest 1 view    Result Date: 4/18/2024  Interpreted By:  Mushtaq Fitzgerald, STUDY: XR CHEST 1 VIEW; 4/18/2024 1:52 pm   INDICATION: Signs/Symptoms:right sided weakness   COMPARISON: September 2013   ACCESSION NUMBER(S): [id number]   ORDERING CLINICIAN: HILLARY ARMANDO   FINDINGS: The study is limited due to rotation and poor inspiratory effort, with resultant crowding of the pulmonary vasculature. The cardiomediastinal silhouette is within normal limits for the technique. Prominence of the interstitial markings is noted bilaterally. There is no pneumothorax,  confluent infiltrates or significant effusion. The osseous structures are changed.       Allowing for the aforementioned limitation, interstitial lung disease without acute infiltrate.   Signed by: Mushtaq Fitzgerald 4/18/2024 2:25 PM Dictation workstation:   [id number]    CT brain attack head wo IV contrast    Result Date: 4/18/2024  Interpreted By:  Manolo Lara, STUDY: CT BRAIN ATTACK HEAD WO IV CONTRAST;  4/18/2024 1:12 pm   INDICATION: Signs/Symptoms:Stroke Evaluation.   COMPARISON: September 9, 2013 head CT, September 9, 2013 MRI brain   ACCESSION NUMBER(S): [id number]   ORDERING CLINICIAN: HILLARY ARMANDO   TECHNIQUE: Noncontrast axial CT scan of head was performed. Angled reformats in brain and bone windows were generated. The images were reviewed in bone, brain, blood and soft tissue windows.   FINDINGS: CSF Spaces: The ventricles, sulci and basal cisterns are within normal limits. There is no extraaxial fluid collection.   Parenchyma:  The grey-white differentiation is intact. There is no mass effect or midline shift.  There is no intracranial hemorrhage.   Calvarium: The calvarium is unremarkable.   Paranasal sinuses and mastoids: Minimal partial opacification of the right mastoid air cells. The paranasal sinuses are clear.       No evidence of acute cortical infarct or intracranial hemorrhage.   Atrophy and nonspecific low-density white matter changes.   Minimal partial opacification right mastoid air cells.   MACRO: Manolo Lara discussed the significance and urgency of this critical finding by secure chat with  HILLARY ARMANDO on 4/18/2024 at 1:38 pm.  (**-RCF-**) Findings:  See findings.     Signed by: Manolo Lara 4/18/2024 1:38 PM Dictation workstation:   [id number]                  Assessment/Plan   This is a 89 y.o. female presenting with a past medical history of HTN, CKD stage 3, sinus bradycardia, and Alzheimer dementia. Patient is confused at her baseline and unable to contribute to  HPI, information was obtained via chart and from the medicine [facility]. Patient presented with abdominal pain, nausea and vomiting. ED work up was significant for elevated WBC count 15.5, ANC 13.82, CRP 3.79, and positive for a urinary tract infection. CT A/P was significant for nonspecific gallbladder wall thickening without calcified gall stones, had US of the GB which shows mildly thickened wall without biliary duct dilatation. IR consulted for percutaneous cholecystostomy tube.     Patients clinical condition seems to have improved overnight. Much more alert and talkative with staff today. Had HIDA scan yesterday evening which showed delayed GB filling with a patent cystic duct. This could suggest chronic cholecystitis, recommend IV antibiotic therapy but no plan for a percutaneous cholecystostomy at this time. Had an in depth discussion with the patient's son who is at the bedside and answered all questions.     IR to sign off.            I spent 35 minutes in the professional and overall care of this patient.      Ricardo Silva, APRN-CNP

## 2024-05-09 NOTE — PROGRESS NOTES
Juanis Cornejo is a 89 y.o. female on day 1 of admission presenting with RUQ abdominal pain.      Subjective   Pt is not able to provide hx   She does havepain in abdo and given 1 mg of morphine    No shortness of breath   No nausea vomiting        Objective     Last Recorded Vitals  /76   Pulse 73   Temp 36.9 °C (98.5 °F)   Resp 18   Wt 54.2 kg (119 lb 7.8 oz)   SpO2 95%   Intake/Output last 3 Shifts:    Intake/Output Summary (Last 24 hours) at 5/9/2024 1744  Last data filed at 5/9/2024 0011  Gross per 24 hour   Intake 50 ml   Output 150 ml   Net -100 ml       Admission Weight  Weight: 54.2 kg (119 lb 7.8 oz) (05/07/24 2240)    Daily Weight  05/07/24 : 54.2 kg (119 lb 7.8 oz)    Image Results  NM hepatobiliary  Narrative: Interpreted By:  Garett Ricks,  and Aurora Willett   STUDY:  NM HEPATOBILIARY;  5/8/2024 3:48 pm      INDICATION:  Signs/Symptoms:possible acute cholecystitis.      COMPARISON:  Ultrasound gallbladder from 05/07/2024  CT abdomen from 05/07/2024      ACCESSION NUMBER(S):  [id number]      ORDERING CLINICIAN:  ZOILA LONDON      TECHNIQUE:  DIVISION OF NUCLEAR MEDICINE  HEPATOBILIARY SCAN (HIDA)      The patient received an intravenous dose of  5.7 mCi of Tc-99m  mebrofenin (Choletec).  Sequential images of the upper abdomen were  then acquired over the next 120 minutes.      FINDINGS:  There is prompt accumulation of activity within the liver and normal  subsequent excretion via the biliary ductal system into the small  bowel.  The gallbladder first visualizes at about  108 minutes after  radiopharmaceutical injection and progressively fills.      Impression: Delayed filling of the gallbladder with no sign of cystic duct  obstruction/acute cholecystitis.      I personally reviewed the images/study and I agree with the findings  as stated by Brennon Benson MD (Radiology Resident).  This study was interpreted at [facility],  [place], [place].      MACRO:  None      Signed by: Garett Ricks 5/8/2024 4:07 PM  Dictation workstation:   [id number]  ECG 12 lead  Normal sinus rhythm  Anterolateral infarct (cited on or before 21-NOV-2014)  Abnormal ECG  When compared with ECG of 18-APR-2024 13:22,  QRS axis Shifted right  T wave amplitude has decreased in Anterior leads  T wave inversion no longer evident in Lateral leads  See ED provider note for full interpretation and clinical correlation  Confirmed by Loretta Rodrigues ([id number]) on 5/8/2024 11:01:43 AM      Physical Exam  Well developed well nurished no distress  Chest clear  CVS regular  Abdo soft bs active, no masses, no tenderness however exam is difficult as pt does not move her arms from her abdomen  Ext no edema  Cns alert   Skin normal turger    Relevant Results    Scheduled medications  amLODIPine, 10 mg, oral, q AM  aspirin, 81 mg, oral, Daily  atorvastatin, 40 mg, oral, Nightly  busPIRone, 10 mg, oral, TID  docusate sodium, 100 mg, oral, BID  [Held by provider] enoxaparin, 40 mg, subcutaneous, q24h  [Held by provider] furosemide, 40 mg, oral, Daily  iohexol, 85 mL, intravenous, Once in imaging  lisinopril, 20 mg, oral, Daily  lubricating eye drops, 2 drop, Both Eyes, BID  mirtazapine, 15 mg, oral, q PM  pantoprazole, 40 mg, oral, Daily before breakfast   Or  pantoprazole, 40 mg, intravenous, Daily before breakfast  piperacillin-tazobactam, 2.25 g, intravenous, q6h  risperiDONE, 0.25 mg, oral, BID  Tc-99m-albumin, 5.7 millicurie, intravenous, Once in imaging      Continuous medications     PRN medications  PRN medications: acetaminophen, hydrALAZINE, melatonin, ondansetron, polyethylene glycol, risperiDONE  Results for orders placed or performed during the hospital encounter of 05/07/24 (from the past 96 hour(s))   ECG 12 lead   Result Value Ref Range    Ventricular Rate 70 BPM    Atrial Rate 70 BPM    MO Interval 206 ms    QRS Duration 102 ms    QT Interval 400 ms    QTC  Calculation(Bazett) 432 ms    R Axis 116 degrees    T Axis 53 degrees    QRS Count 12 beats    Q Onset 222 ms    P Onset 119 ms    P Offset 167 ms    T Offset 422 ms    QTC Fredericia 421 ms   CBC and Auto Differential   Result Value Ref Range    WBC 15.5 (H) 4.4 - 11.3 x10*3/uL    nRBC 0.0 0.0 - 0.0 /100 WBCs    RBC 4.01 4.00 - 5.20 x10*6/uL    Hemoglobin 13.2 12.0 - 16.0 g/dL    Hematocrit 40.6 36.0 - 46.0 %     (H) 80 - 100 fL    MCH 32.9 26.0 - 34.0 pg    MCHC 32.5 32.0 - 36.0 g/dL    RDW 14.6 (H) 11.5 - 14.5 %    Platelets 218 150 - 450 x10*3/uL    Neutrophils % 89.2 40.0 - 80.0 %    Immature Granulocytes %, Automated 0.5 0.0 - 0.9 %    Lymphocytes % 2.6 13.0 - 44.0 %    Monocytes % 7.6 2.0 - 10.0 %    Eosinophils % 0.0 0.0 - 6.0 %    Basophils % 0.1 0.0 - 2.0 %    Neutrophils Absolute 13.82 (H) 1.60 - 5.50 x10*3/uL    Immature Granulocytes Absolute, Automated 0.08 0.00 - 0.50 x10*3/uL    Lymphocytes Absolute 0.41 (L) 0.80 - 3.00 x10*3/uL    Monocytes Absolute 1.18 (H) 0.05 - 0.80 x10*3/uL    Eosinophils Absolute 0.00 0.00 - 0.40 x10*3/uL    Basophils Absolute 0.02 0.00 - 0.10 x10*3/uL   Comprehensive metabolic panel   Result Value Ref Range    Glucose 113 (H) 74 - 99 mg/dL    Sodium 136 136 - 145 mmol/L    Potassium 5.5 (H) 3.5 - 5.3 mmol/L    Chloride 105 98 - 107 mmol/L    Bicarbonate 23 21 - 32 mmol/L    Anion Gap 14 10 - 20 mmol/L    Urea Nitrogen 18 6 - 23 mg/dL    Creatinine 1.10 (H) 0.50 - 1.05 mg/dL    eGFR 46 (L) >60 mL/min/1.73m*2    Calcium 8.7 8.6 - 10.3 mg/dL    Albumin 3.8 3.4 - 5.0 g/dL    Alkaline Phosphatase 106 33 - 136 U/L    Total Protein 6.3 (L) 6.4 - 8.2 g/dL    AST 59 (H) 9 - 39 U/L    Bilirubin, Total 0.9 0.0 - 1.2 mg/dL    ALT 38 7 - 45 U/L   Lipase   Result Value Ref Range    Lipase 12 9 - 82 U/L   Blood Culture    Specimen: Peripheral Venipuncture; Blood culture   Result Value Ref Range    Blood Culture No growth at 1 day    Blood Culture    Specimen: Peripheral Venipuncture;  Blood culture   Result Value Ref Range    Blood Culture No growth at 1 day    Basic metabolic panel   Result Value Ref Range    Glucose 128 (H) 74 - 99 mg/dL    Sodium 137 136 - 145 mmol/L    Potassium 4.6 3.5 - 5.3 mmol/L    Chloride 106 98 - 107 mmol/L    Bicarbonate 24 21 - 32 mmol/L    Anion Gap 12 10 - 20 mmol/L    Urea Nitrogen 16 6 - 23 mg/dL    Creatinine 1.15 (H) 0.50 - 1.05 mg/dL    eGFR 44 (L) >60 mL/min/1.73m*2    Calcium 8.1 (L) 8.6 - 10.3 mg/dL   Urinalysis with Reflex Culture and Microscopic   Result Value Ref Range    Color, Urine Orange (N) Light-Yellow, Yellow, Dark-Yellow    Appearance, Urine Ex.Turbid (N) Clear    Specific Gravity, Urine 1.035 1.005 - 1.035    pH, Urine 6.0 5.0, 5.5, 6.0, 6.5, 7.0, 7.5, 8.0    Protein, Urine 10 (TRACE) NEGATIVE, 10 (TRACE), 20 (TRACE) mg/dL    Glucose, Urine Normal Normal mg/dL    Blood, Urine 0.1 (1+) (A) NEGATIVE    Ketones, Urine TRACE (A) NEGATIVE mg/dL    Bilirubin, Urine NEGATIVE NEGATIVE    Urobilinogen, Urine 3 (1+) (A) Normal mg/dL    Nitrite, Urine NEGATIVE NEGATIVE    Leukocyte Esterase, Urine 250 Blanca/µL (A) NEGATIVE   Microscopic Only, Urine   Result Value Ref Range    WBC, Urine >50 (A) 1-5, NONE /HPF    RBC, Urine 11-20 (A) NONE, 1-2, 3-5 /HPF    Bacteria, Urine 4+ (A) NONE SEEN /HPF   Urine Culture    Specimen: Clean Catch/Voided; Urine   Result Value Ref Range    Urine Culture (A)      Multiple organisms present, probable contamination. Repeat culture if clinically indicated.   CBC   Result Value Ref Range    WBC 13.7 (H) 4.4 - 11.3 x10*3/uL    nRBC 0.0 0.0 - 0.0 /100 WBCs    RBC 3.59 (L) 4.00 - 5.20 x10*6/uL    Hemoglobin 12.0 12.0 - 16.0 g/dL    Hematocrit 37.3 36.0 - 46.0 %     (H) 80 - 100 fL    MCH 33.4 26.0 - 34.0 pg    MCHC 32.2 32.0 - 36.0 g/dL    RDW 14.8 (H) 11.5 - 14.5 %    Platelets 172 150 - 450 x10*3/uL   Magnesium   Result Value Ref Range    Magnesium 1.80 1.60 - 2.40 mg/dL   Coagulation Screen   Result Value Ref Range     Protime 18.0 (H) 9.8 - 12.8 seconds    INR 1.6 (H) 0.9 - 1.1    aPTT 38 27 - 38 seconds   Comprehensive Metabolic Panel   Result Value Ref Range    Glucose 126 (H) 74 - 99 mg/dL    Sodium 137 136 - 145 mmol/L    Potassium 4.3 3.5 - 5.3 mmol/L    Chloride 107 98 - 107 mmol/L    Bicarbonate 23 21 - 32 mmol/L    Anion Gap 11 10 - 20 mmol/L    Urea Nitrogen 16 6 - 23 mg/dL    Creatinine 1.14 (H) 0.50 - 1.05 mg/dL    eGFR 44 (L) >60 mL/min/1.73m*2    Calcium 8.0 (L) 8.6 - 10.3 mg/dL    Albumin 2.9 (L) 3.4 - 5.0 g/dL    Alkaline Phosphatase 82 33 - 136 U/L    Total Protein 4.8 (L) 6.4 - 8.2 g/dL    AST 31 9 - 39 U/L    Bilirubin, Total 1.2 0.0 - 1.2 mg/dL    ALT 29 7 - 45 U/L   C-reactive protein   Result Value Ref Range    C-Reactive Protein 3.79 (H) <1.00 mg/dL   CBC   Result Value Ref Range    WBC 7.8 4.4 - 11.3 x10*3/uL    nRBC 0.0 0.0 - 0.0 /100 WBCs    RBC 3.32 (L) 4.00 - 5.20 x10*6/uL    Hemoglobin 11.2 (L) 12.0 - 16.0 g/dL    Hematocrit 35.9 (L) 36.0 - 46.0 %     (H) 80 - 100 fL    MCH 33.7 26.0 - 34.0 pg    MCHC 31.2 (L) 32.0 - 36.0 g/dL    RDW 14.8 (H) 11.5 - 14.5 %    Platelets 160 150 - 450 x10*3/uL   Magnesium   Result Value Ref Range    Magnesium 1.90 1.60 - 2.40 mg/dL   Comprehensive Metabolic Panel   Result Value Ref Range    Glucose 98 74 - 99 mg/dL    Sodium 139 136 - 145 mmol/L    Potassium 3.7 3.5 - 5.3 mmol/L    Chloride 109 (H) 98 - 107 mmol/L    Bicarbonate 22 21 - 32 mmol/L    Anion Gap 12 10 - 20 mmol/L    Urea Nitrogen 17 6 - 23 mg/dL    Creatinine 1.12 (H) 0.50 - 1.05 mg/dL    eGFR 45 (L) >60 mL/min/1.73m*2    Calcium 7.3 (L) 8.6 - 10.3 mg/dL    Albumin 2.8 (L) 3.4 - 5.0 g/dL    Alkaline Phosphatase 67 33 - 136 U/L    Total Protein 4.6 (L) 6.4 - 8.2 g/dL    AST 30 9 - 39 U/L    Bilirubin, Total 0.9 0.0 - 1.2 mg/dL    ALT 24 7 - 45 U/L   POCT GLUCOSE   Result Value Ref Range    POCT Glucose 96 74 - 99 mg/dL                Assessment/Plan        Principal Problem:    RUQ abdominal  pain  Active Problems:    Hypertension    Late onset Alzheimer dementia (Multi)    Acute cholecystitis    Stage 3a chronic kidney disease (Multi)    Epigastric pain    Noted input from IR and surgery   And possible chronic cholecystitis  Will plan on antibiotics   And follow   Pain control see orders              Newton Agarwal MD

## 2024-05-09 NOTE — CARE PLAN
No  HPOA paperwork  found in   chart. [facility] informed  as  family  had been  requesting  access to pt's  MyChart  records. [facility] informed MyChart  access is  separate  from our  dept.    Liss Wilson, MSW, LSW

## 2024-05-09 NOTE — PROGRESS NOTES
05/09/24 1230   Discharge Planning   Patient expects to be discharged to: [facility]     I called the A.L facility at [phone] and left a message for the DON to call me back in regards to any barriers for the patient returning, in addition I also received a message that the son was requesting a PIN for the patient's my chart, I advised that we do not have POA paperwork on file for the son, the only POA paperwork was from 2013 and has the patient's daughter listed, patient's daughter is also listed on POA on paperwork received from [facility], patient's son is only listed as emergency contact or emergency poa. I advised that if her son is the POA we will need updated paperwork reflecting this.

## 2024-05-09 NOTE — CARE PLAN
Problem: Pain  Goal: My pain/discomfort is manageable  Outcome: Progressing     Problem: Safety  Goal: I will remain free of falls  Outcome: Progressing

## 2024-05-10 ENCOUNTER — APPOINTMENT (OUTPATIENT)
Dept: RADIOLOGY | Facility: HOSPITAL | Age: 89
DRG: 444 | End: 2024-05-10
Payer: MEDICARE

## 2024-05-10 LAB
ALBUMIN SERPL BCP-MCNC: 3.1 G/DL (ref 3.4–5)
ALP SERPL-CCNC: 71 U/L (ref 33–136)
ALT SERPL W P-5'-P-CCNC: 28 U/L (ref 7–45)
ANION GAP SERPL CALC-SCNC: 10 MMOL/L (ref 10–20)
AST SERPL W P-5'-P-CCNC: 39 U/L (ref 9–39)
BILIRUB SERPL-MCNC: 1.2 MG/DL (ref 0–1.2)
BNP SERPL-MCNC: 413 PG/ML (ref 0–99)
BUN SERPL-MCNC: 15 MG/DL (ref 6–23)
CALCIUM SERPL-MCNC: 7.4 MG/DL (ref 8.6–10.3)
CHLORIDE SERPL-SCNC: 110 MMOL/L (ref 98–107)
CO2 SERPL-SCNC: 23 MMOL/L (ref 21–32)
CREAT SERPL-MCNC: 1.19 MG/DL (ref 0.5–1.05)
EGFRCR SERPLBLD CKD-EPI 2021: 42 ML/MIN/1.73M*2
ERYTHROCYTE [DISTWIDTH] IN BLOOD BY AUTOMATED COUNT: 14.7 % (ref 11.5–14.5)
GLUCOSE BLD MANUAL STRIP-MCNC: 107 MG/DL (ref 74–99)
GLUCOSE SERPL-MCNC: 114 MG/DL (ref 74–99)
HCT VFR BLD AUTO: 34 % (ref 36–46)
HGB BLD-MCNC: 11.1 G/DL (ref 12–16)
MAGNESIUM SERPL-MCNC: 1.9 MG/DL (ref 1.6–2.4)
MCH RBC QN AUTO: 32.9 PG (ref 26–34)
MCHC RBC AUTO-ENTMCNC: 32.6 G/DL (ref 32–36)
MCV RBC AUTO: 101 FL (ref 80–100)
NRBC BLD-RTO: 0 /100 WBCS (ref 0–0)
PLATELET # BLD AUTO: 178 X10*3/UL (ref 150–450)
POTASSIUM SERPL-SCNC: 3.4 MMOL/L (ref 3.5–5.3)
PROT SERPL-MCNC: 5.1 G/DL (ref 6.4–8.2)
RBC # BLD AUTO: 3.37 X10*6/UL (ref 4–5.2)
SODIUM SERPL-SCNC: 140 MMOL/L (ref 136–145)
WBC # BLD AUTO: 10.4 X10*3/UL (ref 4.4–11.3)

## 2024-05-10 PROCEDURE — 2500000004 HC RX 250 GENERAL PHARMACY W/ HCPCS (ALT 636 FOR OP/ED): Performed by: FAMILY MEDICINE

## 2024-05-10 PROCEDURE — 1100000001 HC PRIVATE ROOM DAILY

## 2024-05-10 PROCEDURE — 74230 X-RAY XM SWLNG FUNCJ C+: CPT | Performed by: RADIOLOGY

## 2024-05-10 PROCEDURE — 80053 COMPREHEN METABOLIC PANEL: CPT | Performed by: NURSE PRACTITIONER

## 2024-05-10 PROCEDURE — 2500000001 HC RX 250 WO HCPCS SELF ADMINISTERED DRUGS (ALT 637 FOR MEDICARE OP): Performed by: FAMILY MEDICINE

## 2024-05-10 PROCEDURE — 2500000004 HC RX 250 GENERAL PHARMACY W/ HCPCS (ALT 636 FOR OP/ED): Performed by: NURSE PRACTITIONER

## 2024-05-10 PROCEDURE — 74230 X-RAY XM SWLNG FUNCJ C+: CPT

## 2024-05-10 PROCEDURE — 82947 ASSAY GLUCOSE BLOOD QUANT: CPT

## 2024-05-10 PROCEDURE — 36415 COLL VENOUS BLD VENIPUNCTURE: CPT | Performed by: NURSE PRACTITIONER

## 2024-05-10 PROCEDURE — 85027 COMPLETE CBC AUTOMATED: CPT | Performed by: NURSE PRACTITIONER

## 2024-05-10 PROCEDURE — 83880 ASSAY OF NATRIURETIC PEPTIDE: CPT | Performed by: FAMILY MEDICINE

## 2024-05-10 PROCEDURE — 92611 MOTION FLUOROSCOPY/SWALLOW: CPT | Mod: GN

## 2024-05-10 PROCEDURE — 2500000005 HC RX 250 GENERAL PHARMACY W/O HCPCS: Performed by: NURSE PRACTITIONER

## 2024-05-10 PROCEDURE — C9113 INJ PANTOPRAZOLE SODIUM, VIA: HCPCS | Performed by: NURSE PRACTITIONER

## 2024-05-10 PROCEDURE — 83735 ASSAY OF MAGNESIUM: CPT | Performed by: NURSE PRACTITIONER

## 2024-05-10 PROCEDURE — 71046 X-RAY EXAM CHEST 2 VIEWS: CPT

## 2024-05-10 PROCEDURE — 71046 X-RAY EXAM CHEST 2 VIEWS: CPT | Performed by: RADIOLOGY

## 2024-05-10 RX ORDER — POTASSIUM CHLORIDE 14.9 MG/ML
20 INJECTION INTRAVENOUS
Status: COMPLETED | OUTPATIENT
Start: 2024-05-10 | End: 2024-05-10

## 2024-05-10 RX ADMIN — BARIUM SULFATE 60 ML: 0.81 POWDER, FOR SUSPENSION ORAL at 12:03

## 2024-05-10 RX ADMIN — PIPERACILLIN SODIUM AND TAZOBACTAM SODIUM 2.25 G: 2; .25 INJECTION, SOLUTION INTRAVENOUS at 23:25

## 2024-05-10 RX ADMIN — DEXTROSE AND SODIUM CHLORIDE 75 ML/HR: 5; 900 INJECTION, SOLUTION INTRAVENOUS at 14:37

## 2024-05-10 RX ADMIN — BARIUM SULFATE 5 ML: 400 SUSPENSION ORAL at 12:02

## 2024-05-10 RX ADMIN — BARIUM SULFATE 30 ML: 400 SUSPENSION ORAL at 12:03

## 2024-05-10 RX ADMIN — CARBOXYMETHYLCELLULOSE SODIUM 2 DROP: 0.5 SOLUTION/ DROPS OPHTHALMIC at 21:00

## 2024-05-10 RX ADMIN — PIPERACILLIN SODIUM AND TAZOBACTAM SODIUM 2.25 G: 2; .25 INJECTION, SOLUTION INTRAVENOUS at 02:12

## 2024-05-10 RX ADMIN — BARIUM SULFATE 10 ML: 400 PASTE ORAL at 12:03

## 2024-05-10 RX ADMIN — PANTOPRAZOLE SODIUM 40 MG: 40 INJECTION, POWDER, FOR SOLUTION INTRAVENOUS at 09:40

## 2024-05-10 RX ADMIN — PIPERACILLIN SODIUM AND TAZOBACTAM SODIUM 2.25 G: 2; .25 INJECTION, SOLUTION INTRAVENOUS at 18:52

## 2024-05-10 RX ADMIN — POTASSIUM CHLORIDE 20 MEQ: 14.9 INJECTION, SOLUTION INTRAVENOUS at 09:40

## 2024-05-10 RX ADMIN — PIPERACILLIN SODIUM AND TAZOBACTAM SODIUM 2.25 G: 2; .25 INJECTION, SOLUTION INTRAVENOUS at 13:48

## 2024-05-10 RX ADMIN — POTASSIUM CHLORIDE 20 MEQ: 14.9 INJECTION, SOLUTION INTRAVENOUS at 13:48

## 2024-05-10 ASSESSMENT — COGNITIVE AND FUNCTIONAL STATUS - GENERAL
DRESSING REGULAR UPPER BODY CLOTHING: TOTAL
PERSONAL GROOMING: TOTAL
TOILETING: TOTAL
STANDING UP FROM CHAIR USING ARMS: TOTAL
DRESSING REGULAR LOWER BODY CLOTHING: TOTAL
TOILETING: TOTAL
HELP NEEDED FOR BATHING: TOTAL
MOVING TO AND FROM BED TO CHAIR: TOTAL
TURNING FROM BACK TO SIDE WHILE IN FLAT BAD: TOTAL
WALKING IN HOSPITAL ROOM: TOTAL
MOBILITY SCORE: 6
MOVING FROM LYING ON BACK TO SITTING ON SIDE OF FLAT BED WITH BEDRAILS: TOTAL
STANDING UP FROM CHAIR USING ARMS: TOTAL
MOBILITY SCORE: 6
MOVING FROM LYING ON BACK TO SITTING ON SIDE OF FLAT BED WITH BEDRAILS: TOTAL
DRESSING REGULAR LOWER BODY CLOTHING: TOTAL
DRESSING REGULAR UPPER BODY CLOTHING: TOTAL
CLIMB 3 TO 5 STEPS WITH RAILING: TOTAL
MOVING TO AND FROM BED TO CHAIR: TOTAL
HELP NEEDED FOR BATHING: TOTAL
TURNING FROM BACK TO SIDE WHILE IN FLAT BAD: TOTAL
CLIMB 3 TO 5 STEPS WITH RAILING: TOTAL
PERSONAL GROOMING: TOTAL
WALKING IN HOSPITAL ROOM: TOTAL
DAILY ACTIVITIY SCORE: 6
EATING MEALS: TOTAL
DAILY ACTIVITIY SCORE: 6
EATING MEALS: TOTAL

## 2024-05-10 NOTE — CARE PLAN
Problem: Safety  Goal: Patient will be injury free during hospitalization  Outcome: Progressing  Goal: I will remain free of falls  Outcome: Progressing     Problem: Skin  Goal: Decreased wound size/increased tissue granulation at next dressing change  Outcome: Progressing  Flowsheets (Taken 5/8/2024 1121 by Jose Martin Das, RN)  Decreased wound size/increased tissue granulation at next dressing change: Protective dressings over bony prominences  Goal: Participates in plan/prevention/treatment measures  Outcome: Progressing  Flowsheets (Taken 5/8/2024 1121 by Jose Martin Das, RN)  Participates in plan/prevention/treatment measures: Increase activity/out of bed for meals  Goal: Prevent/manage excess moisture  Outcome: Progressing  Flowsheets (Taken 5/8/2024 1121 by Jose Martin Das, RN)  Prevent/manage excess moisture:   Moisturize dry skin   Cleanse incontinence/protect with barrier cream  Goal: Prevent/minimize sheer/friction injuries  Outcome: Progressing  Flowsheets (Taken 5/8/2024 1121 by Jose Martin Das, RN)  Prevent/minimize sheer/friction injuries:   Use pull sheet   Increase activity/out of bed for meals  Goal: Promote/optimize nutrition  Outcome: Progressing  Flowsheets (Taken 5/8/2024 1121 by Jose Martin Das, RN)  Promote/optimize nutrition: Monitor/record intake including meals  Goal: Promote skin healing  Outcome: Progressing  Flowsheets (Taken 5/8/2024 1121 by Jose Martin Das, RN)  Promote skin healing:   Assess skin/pad under line(s)/device(s)   Turn/reposition every 2 hours/use positioning/transfer devices   The patient's goals for the shift include      The clinical goals for the shift include pt safety will be maintained in duration of the shift.    Over the shift, the patient did make progress toward the following goals.

## 2024-05-10 NOTE — ED PROVIDER NOTES
HPI   Chief Complaint   Patient presents with    Nausea    Vomiting     Pt brought in by ems from [facility] dementia unit. EMS stated that the staff told them the pt has vomited 3 times today.  Pt was unable to answer questions with a normal baseline of a&ox1.  Pt has a bruise on the left side of her face from a fall she had on 4/30/24       Chief complaint: Vomiting    History of present illness: Patient is a 89-year-old female with history of dementia presenting to the emergency department with complaints of vomiting.  Patient is unable to provide any history secondary to her baseline dementia however according to EMS, the patient had several episodes of vomiting today.  According to EMS, the patient is not known to have any fever and is only oriented to person which is her baseline.      History provided by:  EMS personnel   used: No                        No data recorded                   Patient History   Past Medical History:   Diagnosis Date    Alzheimer's dementia (Multi) 05/07/2024    Anxiety 05/07/2024    Delusional disorder (Multi) 05/07/2024    Hemochromatosis, unspecified     Hypertension 05/07/2024    Stage 3a chronic kidney disease (Multi) 05/07/2024    TIA (transient ischemic attack) 04/18/2024     Past Surgical History:   Procedure Laterality Date    CARPAL TUNNEL RELEASE  06/14/2013    Neuroplasty Decompression Median Nerve At Carpal Tunnel    CATARACT EXTRACTION  06/14/2013    Cataract Surgery    COLONOSCOPY  06/14/2013    Complete Colonoscopy    MR HEAD ANGIO WO IV CONTRAST  9/9/2013    MR HEAD ANGIO WO IV CONTRAST 9/9/2013 [facility]    MR NECK ANGIO WO IV CONTRAST  9/9/2013    MR NECK ANGIO WO IV CONTRAST 9/9/2013 [facility] EMERGENCY LEGACY    OTHER SURGICAL HISTORY  06/14/2013    Phlebotomy (Therapeutic)    OTHER SURGICAL HISTORY  06/14/2013    Stress Test ECG Performed     No family history on file.  Social History     Tobacco Use    Smoking status: Unknown      Passive exposure: Never    Smokeless tobacco: Not on file   Vaping Use    Vaping status: Unknown   Substance Use Topics    Alcohol use: Not on file    Drug use: Not on file       Physical Exam   ED Triage Vitals   Temp Heart Rate Resp BP   05/07/24 1912 05/07/24 1200 05/07/24 1200 05/07/24 1200   36.2 °C (97.2 °F) 80 (!) 21 141/72      SpO2 Temp Source Heart Rate Source Patient Position   05/07/24 1200 05/07/24 2240 05/08/24 0737 05/08/24 0737   97 % Temporal Monitor Lying      BP Location FiO2 (%)     05/08/24 0737 --     Right arm        Physical Exam  Constitutional:       Appearance: Normal appearance.   HENT:      Head: Normocephalic and atraumatic.      Right Ear: External ear normal.      Left Ear: External ear normal.      Nose: Nose normal.      Mouth/Throat:      Mouth: Mucous membranes are moist.   Eyes:      General: Lids are normal.      Extraocular Movements: Extraocular movements intact.      Pupils: Pupils are equal, round, and reactive to light.   Cardiovascular:      Rate and Rhythm: Normal rate and regular rhythm.      Heart sounds: Normal heart sounds.   Pulmonary:      Effort: Pulmonary effort is normal.      Breath sounds: Normal breath sounds.   Abdominal:      General: Abdomen is flat.      Palpations: Abdomen is soft.      Tenderness: There is generalized abdominal tenderness. There is no guarding or rebound.   Musculoskeletal:         General: No deformity. Normal range of motion.      Cervical back: Normal range of motion and neck supple.   Skin:     General: Skin is warm.      Capillary Refill: Capillary refill takes less than 2 seconds.      Coloration: Skin is not jaundiced.   Neurological:      General: No focal deficit present.      Mental Status: She is alert. Mental status is at baseline.   Psychiatric:         Mood and Affect: Mood normal.         Behavior: Behavior normal.         ED Course & MDM   Diagnoses as of 05/10/24 1426   Epigastric pain   Cholecystitis       Medical  Decision Making  Medical decision making: Patient remained stable throughout her time in the emergency department.  CBC demonstrated white cell count of 15.5 but no other significant abnormalities.  The patient's Chem-7 was within normal limits LFTs were unremarkable urinalysis demonstrated 250 leukocytes with no other significant abnormalities while CAT scan of the abdomen pelvis with IV contrast demonstrated mesenteric adenopathy and fat stranding medial to the cecum and right colon possible mesenteric adenitis with gallbladder wall thickening and no other significant acute abnormalities. The patient's EKG demonstrated a normal sinus rhythm of 70 bpm the patient has a left bundle branch block present there is no evidence of ischemia based on Sgarbossa's criteria QTc was 432.    Patient presents to the emergency department with complaints of vomiting.  On physical examination the patient is relatively comfortable.  The nurses had difficulty obtaining IV access in this patient and as a result I used ultrasound guidance to obtain a left-sided IV and the patient.  At this time we are awaiting an ultrasound of the gallbladder if this is negative I be comfortable with patient being discharged home with a prescription for Augmentin given her likely mesenteric adenitis is diagnosis.  The patient's case was signed out to the oncoming physician please see their note for further evaluation and ultimate disposition from the emergency department.    Amount and/or Complexity of Data Reviewed  Labs: ordered. Decision-making details documented in ED Course.  Radiology: ordered. Decision-making details documented in ED Course.        Procedure  Procedures     Danie Cordon MD  05/10/24 1422       Danie Cordon MD  06/23/24 2133

## 2024-05-10 NOTE — PROCEDURES
"Speech-Language Pathology      Modified Barium Swallow Study     Patient Name: Juanis Cornejo  MRN: [id number]  : 6/15/1928  Today's Date: 05/10/24  Time Calculation  Start Time: 1115  Stop Time: 1140  Time Calculation (min): 25 min       Recommendations:  PUREE DIET WITH HONEY/MODERATELY THICKENED LIQUIDS  UPRIGHT AT 90 DEGREES FOR ALL PO  SLOW RATE AND SMALL BOLUS SIZE  RESWALLOW  FEEDING ASSISTANCE  MEDS CRUSHED IN PUREE CARRIER     Assessment/Impression:    Full detailed SLP/Radiologist Modified Barium Swallow study report can be found under Chart Review tab, Imaging tab and  titled \"FL Modified Barium Swallow Study\"      Pt. presenting with oropharyngeal dysphagia characterized by reduced bolus formation and lingual weakness. Premature pharyngeal entry noted with liquid boluses. Laryngeal penetration noted intermittently with nectar liquids and inconsistently with thin liquids. No aspiration viewed, however risk for same exists.  No airway entry visualized with honey liquids or puree boluses. Did not assess solids 2/2 oral phase deficits. Adequate pharyngeal clearance achieved with all boluses tested. Modification of diet required to maximize swallowing efficiency and safety. Did not perform esophageal sweep due to positioning challenges.    Plan:  Treatment/Interventions: Oral motor exercises, Patient/family education, Bolus trials  SLP Plan: Skilled SLP warranted  SLP Frequency: 3x per week  Duration: 30 days    Discussed POC: Patient  Discussed Risks/Benefits: Yes  Patient/Caregiver Agreeable: Yes    Pain:   Rating 0-10: unable to determine  Location: n/a       Goals:  Pt. to tolerate least restrictive diet without pulmonary compromise    Education:   Pt. educated on results of MBS study, recommended diet and recommended safe swallow strategies.  Verbal understanding given       "

## 2024-05-10 NOTE — PROGRESS NOTES
05/10/24 0935   Discharge Planning   Patient expects to be discharged to: [facility]     I called the [facility] facility again left a message for DON to call me back in regards to barriers to return    12:26 spoke to DON at facility there are no barriers for patient to return, I advised ADOD possibly Monday, once patient is med ready will discharge back to [facility]

## 2024-05-10 NOTE — NURSING NOTE
2000 Spoke with Dr. Agarwal on the phone . Gave telephone orders with read back for D5 NS @ 75 ml/hr. 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 Dr. Agarwal gave telephone orders with readback for 1mg of Morphine IV push q 4 hours. Plan of care ongoing.

## 2024-05-10 NOTE — PROGRESS NOTES
Juanis Cornejo is a 89 y.o. female on day 2 of admission presenting with RUQ abdominal pain.      Subjective   Pt is not able to provide hx   She does havepain in abdo and given 1 mg of morphine  Does have cough          Objective     Last Recorded Vitals  /54 (Patient Position: Lying)   Pulse 69   Temp 37.2 °C (99 °F) (Temporal)   Resp 20   Wt 54.2 kg (119 lb 7.8 oz)   SpO2 93%   Intake/Output last 3 Shifts:    Intake/Output Summary (Last 24 hours) at 5/10/2024 0834  Last data filed at 5/10/2024 0430  Gross per 24 hour   Intake --   Output 150 ml   Net -150 ml       Admission Weight  Weight: 54.2 kg (119 lb 7.8 oz) (05/07/24 2240)    Daily Weight  05/07/24 : 54.2 kg (119 lb 7.8 oz)    Image Results  NM hepatobiliary  Narrative: Interpreted By:  Garett Ricks,  and Aurora Willett   STUDY:  NM HEPATOBILIARY;  5/8/2024 3:48 pm      INDICATION:  Signs/Symptoms:possible acute cholecystitis.      COMPARISON:  Ultrasound gallbladder from 05/07/2024  CT abdomen from 05/07/2024      ACCESSION NUMBER(S):  [id number]      ORDERING CLINICIAN:  ZOILA LONDON      TECHNIQUE:  DIVISION OF NUCLEAR MEDICINE  HEPATOBILIARY SCAN (HIDA)      The patient received an intravenous dose of  5.7 mCi of Tc-99m  mebrofenin (Choletec).  Sequential images of the upper abdomen were  then acquired over the next 120 minutes.      FINDINGS:  There is prompt accumulation of activity within the liver and normal  subsequent excretion via the biliary ductal system into the small  bowel.  The gallbladder first visualizes at about  108 minutes after  radiopharmaceutical injection and progressively fills.      Impression: Delayed filling of the gallbladder with no sign of cystic duct  obstruction/acute cholecystitis.      I personally reviewed the images/study and I agree with the findings  as stated by Brennon Benson MD (Radiology Resident).  This study was interpreted at [facility]  [place], [place].      MACRO:  None      Signed by: Garett Ricks 5/8/2024 4:07 PM  Dictation workstation:   [id number]  ECG 12 lead  Normal sinus rhythm  Anterolateral infarct (cited on or before 21-NOV-2014)  Abnormal ECG  When compared with ECG of 18-APR-2024 13:22,  QRS axis Shifted right  T wave amplitude has decreased in Anterior leads  T wave inversion no longer evident in Lateral leads  See ED provider note for full interpretation and clinical correlation  Confirmed by Loretta Rodrigues ([id number]) on 5/8/2024 11:01:43 AM      Physical Exam  Well developed well nurished no distress  Chest mild congestion  CVS regular  Abdo soft bs active, no masses, no tenderness however exam is difficult as pt does not move her arms from her abdomen  Ext no edema  Cns alert   Skin normal turger    Relevant Results    Scheduled medications  amLODIPine, 10 mg, oral, q AM  aspirin, 81 mg, oral, Daily  atorvastatin, 40 mg, oral, Nightly  busPIRone, 10 mg, oral, TID  docusate sodium, 100 mg, oral, BID  [Held by provider] enoxaparin, 40 mg, subcutaneous, q24h  [Held by provider] furosemide, 40 mg, oral, Daily  iohexol, 85 mL, intravenous, Once in imaging  lisinopril, 20 mg, oral, Daily  lubricating eye drops, 2 drop, Both Eyes, BID  mirtazapine, 15 mg, oral, q PM  pantoprazole, 40 mg, oral, Daily before breakfast   Or  pantoprazole, 40 mg, intravenous, Daily before breakfast  piperacillin-tazobactam, 2.25 g, intravenous, q6h  risperiDONE, 0.25 mg, oral, BID  Tc-99m-albumin, 5.7 millicurie, intravenous, Once in imaging      Continuous medications  D5 % and 0.9 % sodium chloride, 75 mL/hr, Last Rate: 75 mL/hr (05/09/24 2101)      PRN medications  PRN medications: acetaminophen, hydrALAZINE, melatonin, morphine, ondansetron, polyethylene glycol, risperiDONE  Results for orders placed or performed during the hospital encounter of 05/07/24 (from the past 96 hour(s))   ECG 12 lead   Result Value Ref Range    Ventricular  Rate 70 BPM    Atrial Rate 70 BPM    VT Interval 206 ms    QRS Duration 102 ms    QT Interval 400 ms    QTC Calculation(Bazett) 432 ms    R Axis 116 degrees    T Axis 53 degrees    QRS Count 12 beats    Q Onset 222 ms    P Onset 119 ms    P Offset 167 ms    T Offset 422 ms    QTC Fredericia 421 ms   CBC and Auto Differential   Result Value Ref Range    WBC 15.5 (H) 4.4 - 11.3 x10*3/uL    nRBC 0.0 0.0 - 0.0 /100 WBCs    RBC 4.01 4.00 - 5.20 x10*6/uL    Hemoglobin 13.2 12.0 - 16.0 g/dL    Hematocrit 40.6 36.0 - 46.0 %     (H) 80 - 100 fL    MCH 32.9 26.0 - 34.0 pg    MCHC 32.5 32.0 - 36.0 g/dL    RDW 14.6 (H) 11.5 - 14.5 %    Platelets 218 150 - 450 x10*3/uL    Neutrophils % 89.2 40.0 - 80.0 %    Immature Granulocytes %, Automated 0.5 0.0 - 0.9 %    Lymphocytes % 2.6 13.0 - 44.0 %    Monocytes % 7.6 2.0 - 10.0 %    Eosinophils % 0.0 0.0 - 6.0 %    Basophils % 0.1 0.0 - 2.0 %    Neutrophils Absolute 13.82 (H) 1.60 - 5.50 x10*3/uL    Immature Granulocytes Absolute, Automated 0.08 0.00 - 0.50 x10*3/uL    Lymphocytes Absolute 0.41 (L) 0.80 - 3.00 x10*3/uL    Monocytes Absolute 1.18 (H) 0.05 - 0.80 x10*3/uL    Eosinophils Absolute 0.00 0.00 - 0.40 x10*3/uL    Basophils Absolute 0.02 0.00 - 0.10 x10*3/uL   Comprehensive metabolic panel   Result Value Ref Range    Glucose 113 (H) 74 - 99 mg/dL    Sodium 136 136 - 145 mmol/L    Potassium 5.5 (H) 3.5 - 5.3 mmol/L    Chloride 105 98 - 107 mmol/L    Bicarbonate 23 21 - 32 mmol/L    Anion Gap 14 10 - 20 mmol/L    Urea Nitrogen 18 6 - 23 mg/dL    Creatinine 1.10 (H) 0.50 - 1.05 mg/dL    eGFR 46 (L) >60 mL/min/1.73m*2    Calcium 8.7 8.6 - 10.3 mg/dL    Albumin 3.8 3.4 - 5.0 g/dL    Alkaline Phosphatase 106 33 - 136 U/L    Total Protein 6.3 (L) 6.4 - 8.2 g/dL    AST 59 (H) 9 - 39 U/L    Bilirubin, Total 0.9 0.0 - 1.2 mg/dL    ALT 38 7 - 45 U/L   Lipase   Result Value Ref Range    Lipase 12 9 - 82 U/L   Blood Culture    Specimen: Peripheral Venipuncture; Blood culture   Result  Value Ref Range    Blood Culture No growth at 2 days    Blood Culture    Specimen: Peripheral Venipuncture; Blood culture   Result Value Ref Range    Blood Culture No growth at 2 days    Basic metabolic panel   Result Value Ref Range    Glucose 128 (H) 74 - 99 mg/dL    Sodium 137 136 - 145 mmol/L    Potassium 4.6 3.5 - 5.3 mmol/L    Chloride 106 98 - 107 mmol/L    Bicarbonate 24 21 - 32 mmol/L    Anion Gap 12 10 - 20 mmol/L    Urea Nitrogen 16 6 - 23 mg/dL    Creatinine 1.15 (H) 0.50 - 1.05 mg/dL    eGFR 44 (L) >60 mL/min/1.73m*2    Calcium 8.1 (L) 8.6 - 10.3 mg/dL   Urinalysis with Reflex Culture and Microscopic   Result Value Ref Range    Color, Urine Orange (N) Light-Yellow, Yellow, Dark-Yellow    Appearance, Urine Ex.Turbid (N) Clear    Specific Gravity, Urine 1.035 1.005 - 1.035    pH, Urine 6.0 5.0, 5.5, 6.0, 6.5, 7.0, 7.5, 8.0    Protein, Urine 10 (TRACE) NEGATIVE, 10 (TRACE), 20 (TRACE) mg/dL    Glucose, Urine Normal Normal mg/dL    Blood, Urine 0.1 (1+) (A) NEGATIVE    Ketones, Urine TRACE (A) NEGATIVE mg/dL    Bilirubin, Urine NEGATIVE NEGATIVE    Urobilinogen, Urine 3 (1+) (A) Normal mg/dL    Nitrite, Urine NEGATIVE NEGATIVE    Leukocyte Esterase, Urine 250 Blanca/µL (A) NEGATIVE   Microscopic Only, Urine   Result Value Ref Range    WBC, Urine >50 (A) 1-5, NONE /HPF    RBC, Urine 11-20 (A) NONE, 1-2, 3-5 /HPF    Bacteria, Urine 4+ (A) NONE SEEN /HPF   Urine Culture    Specimen: Clean Catch/Voided; Urine   Result Value Ref Range    Urine Culture (A)      Multiple organisms present, probable contamination. Repeat culture if clinically indicated.   CBC   Result Value Ref Range    WBC 13.7 (H) 4.4 - 11.3 x10*3/uL    nRBC 0.0 0.0 - 0.0 /100 WBCs    RBC 3.59 (L) 4.00 - 5.20 x10*6/uL    Hemoglobin 12.0 12.0 - 16.0 g/dL    Hematocrit 37.3 36.0 - 46.0 %     (H) 80 - 100 fL    MCH 33.4 26.0 - 34.0 pg    MCHC 32.2 32.0 - 36.0 g/dL    RDW 14.8 (H) 11.5 - 14.5 %    Platelets 172 150 - 450 x10*3/uL   Magnesium    Result Value Ref Range    Magnesium 1.80 1.60 - 2.40 mg/dL   Coagulation Screen   Result Value Ref Range    Protime 18.0 (H) 9.8 - 12.8 seconds    INR 1.6 (H) 0.9 - 1.1    aPTT 38 27 - 38 seconds   Comprehensive Metabolic Panel   Result Value Ref Range    Glucose 126 (H) 74 - 99 mg/dL    Sodium 137 136 - 145 mmol/L    Potassium 4.3 3.5 - 5.3 mmol/L    Chloride 107 98 - 107 mmol/L    Bicarbonate 23 21 - 32 mmol/L    Anion Gap 11 10 - 20 mmol/L    Urea Nitrogen 16 6 - 23 mg/dL    Creatinine 1.14 (H) 0.50 - 1.05 mg/dL    eGFR 44 (L) >60 mL/min/1.73m*2    Calcium 8.0 (L) 8.6 - 10.3 mg/dL    Albumin 2.9 (L) 3.4 - 5.0 g/dL    Alkaline Phosphatase 82 33 - 136 U/L    Total Protein 4.8 (L) 6.4 - 8.2 g/dL    AST 31 9 - 39 U/L    Bilirubin, Total 1.2 0.0 - 1.2 mg/dL    ALT 29 7 - 45 U/L   C-reactive protein   Result Value Ref Range    C-Reactive Protein 3.79 (H) <1.00 mg/dL   CBC   Result Value Ref Range    WBC 7.8 4.4 - 11.3 x10*3/uL    nRBC 0.0 0.0 - 0.0 /100 WBCs    RBC 3.32 (L) 4.00 - 5.20 x10*6/uL    Hemoglobin 11.2 (L) 12.0 - 16.0 g/dL    Hematocrit 35.9 (L) 36.0 - 46.0 %     (H) 80 - 100 fL    MCH 33.7 26.0 - 34.0 pg    MCHC 31.2 (L) 32.0 - 36.0 g/dL    RDW 14.8 (H) 11.5 - 14.5 %    Platelets 160 150 - 450 x10*3/uL   Magnesium   Result Value Ref Range    Magnesium 1.90 1.60 - 2.40 mg/dL   Comprehensive Metabolic Panel   Result Value Ref Range    Glucose 98 74 - 99 mg/dL    Sodium 139 136 - 145 mmol/L    Potassium 3.7 3.5 - 5.3 mmol/L    Chloride 109 (H) 98 - 107 mmol/L    Bicarbonate 22 21 - 32 mmol/L    Anion Gap 12 10 - 20 mmol/L    Urea Nitrogen 17 6 - 23 mg/dL    Creatinine 1.12 (H) 0.50 - 1.05 mg/dL    eGFR 45 (L) >60 mL/min/1.73m*2    Calcium 7.3 (L) 8.6 - 10.3 mg/dL    Albumin 2.8 (L) 3.4 - 5.0 g/dL    Alkaline Phosphatase 67 33 - 136 U/L    Total Protein 4.6 (L) 6.4 - 8.2 g/dL    AST 30 9 - 39 U/L    Bilirubin, Total 0.9 0.0 - 1.2 mg/dL    ALT 24 7 - 45 U/L   POCT GLUCOSE   Result Value Ref Range     POCT Glucose 96 74 - 99 mg/dL   POCT GLUCOSE   Result Value Ref Range    POCT Glucose 98 74 - 99 mg/dL   POCT GLUCOSE   Result Value Ref Range    POCT Glucose 107 (H) 74 - 99 mg/dL   CBC   Result Value Ref Range    WBC 10.4 4.4 - 11.3 x10*3/uL    nRBC 0.0 0.0 - 0.0 /100 WBCs    RBC 3.37 (L) 4.00 - 5.20 x10*6/uL    Hemoglobin 11.1 (L) 12.0 - 16.0 g/dL    Hematocrit 34.0 (L) 36.0 - 46.0 %     (H) 80 - 100 fL    MCH 32.9 26.0 - 34.0 pg    MCHC 32.6 32.0 - 36.0 g/dL    RDW 14.7 (H) 11.5 - 14.5 %    Platelets 178 150 - 450 x10*3/uL   Magnesium   Result Value Ref Range    Magnesium 1.90 1.60 - 2.40 mg/dL   Comprehensive Metabolic Panel   Result Value Ref Range    Glucose 114 (H) 74 - 99 mg/dL    Sodium 140 136 - 145 mmol/L    Potassium 3.4 (L) 3.5 - 5.3 mmol/L    Chloride 110 (H) 98 - 107 mmol/L    Bicarbonate 23 21 - 32 mmol/L    Anion Gap 10 10 - 20 mmol/L    Urea Nitrogen 15 6 - 23 mg/dL    Creatinine 1.19 (H) 0.50 - 1.05 mg/dL    eGFR 42 (L) >60 mL/min/1.73m*2    Calcium 7.4 (L) 8.6 - 10.3 mg/dL    Albumin 3.1 (L) 3.4 - 5.0 g/dL    Alkaline Phosphatase 71 33 - 136 U/L    Total Protein 5.1 (L) 6.4 - 8.2 g/dL    AST 39 9 - 39 U/L    Bilirubin, Total 1.2 0.0 - 1.2 mg/dL    ALT 28 7 - 45 U/L       Assessment/Plan        Principal Problem:    RUQ abdominal pain  Active Problems:    Hypertension    Late onset Alzheimer dementia (Multi)    Acute cholecystitis    Stage 3a chronic kidney disease (Multi)    Epigastric pain    Noted input from IR and surgery   And possible chronic cholecystitis  Will plan on antibiotics   And follow   Pain control see orders  Noted cxr  Dw her poa Mr huang españa   Poor prognosis dw him  Pt does have severe dementia and her condition can change quickly   He is going to look for living will   He does not think she would wnt cpr or aggressive care  Will start on diet pureed  Con twtih fluids for now,   Add bnp  Echo with good ef noted              Nweton Agarwal MD    2:56 PM

## 2024-05-10 NOTE — CARE PLAN
Problem: Safety  Goal: Patient will be injury free during hospitalization  Outcome: Progressing  Goal: I will remain free of falls  Outcome: Progressing   The patient's goals for the shift include      The clinical goals for the shift include pt safety will be maintained in duration of the shift.    Over the shift, the patient did make progress toward the following goals.

## 2024-05-11 ENCOUNTER — APPOINTMENT (OUTPATIENT)
Dept: RADIOLOGY | Facility: HOSPITAL | Age: 89
DRG: 444 | End: 2024-05-11
Payer: MEDICARE

## 2024-05-11 LAB
ALBUMIN SERPL BCP-MCNC: 2.5 G/DL (ref 3.4–5)
ALP SERPL-CCNC: 64 U/L (ref 33–136)
ALT SERPL W P-5'-P-CCNC: 25 U/L (ref 7–45)
ANION GAP SERPL CALC-SCNC: 10 MMOL/L (ref 10–20)
AST SERPL W P-5'-P-CCNC: 39 U/L (ref 9–39)
BILIRUB SERPL-MCNC: 0.9 MG/DL (ref 0–1.2)
BNP SERPL-MCNC: 409 PG/ML (ref 0–99)
BUN SERPL-MCNC: 11 MG/DL (ref 6–23)
CALCIUM SERPL-MCNC: 7.3 MG/DL (ref 8.6–10.3)
CHLORIDE SERPL-SCNC: 113 MMOL/L (ref 98–107)
CO2 SERPL-SCNC: 21 MMOL/L (ref 21–32)
CREAT SERPL-MCNC: 0.98 MG/DL (ref 0.5–1.05)
EGFRCR SERPLBLD CKD-EPI 2021: 53 ML/MIN/1.73M*2
ERYTHROCYTE [DISTWIDTH] IN BLOOD BY AUTOMATED COUNT: 15 % (ref 11.5–14.5)
GLUCOSE SERPL-MCNC: 119 MG/DL (ref 74–99)
HCT VFR BLD AUTO: 30.9 % (ref 36–46)
HGB BLD-MCNC: 9.9 G/DL (ref 12–16)
MAGNESIUM SERPL-MCNC: 1.9 MG/DL (ref 1.6–2.4)
MCH RBC QN AUTO: 33.2 PG (ref 26–34)
MCHC RBC AUTO-ENTMCNC: 32 G/DL (ref 32–36)
MCV RBC AUTO: 104 FL (ref 80–100)
NRBC BLD-RTO: 0 /100 WBCS (ref 0–0)
PLATELET # BLD AUTO: 148 X10*3/UL (ref 150–450)
POTASSIUM SERPL-SCNC: 3.5 MMOL/L (ref 3.5–5.3)
PROT SERPL-MCNC: 4.6 G/DL (ref 6.4–8.2)
RBC # BLD AUTO: 2.98 X10*6/UL (ref 4–5.2)
SODIUM SERPL-SCNC: 140 MMOL/L (ref 136–145)
WBC # BLD AUTO: 5.4 X10*3/UL (ref 4.4–11.3)

## 2024-05-11 PROCEDURE — 2500000004 HC RX 250 GENERAL PHARMACY W/ HCPCS (ALT 636 FOR OP/ED): Performed by: INTERNAL MEDICINE

## 2024-05-11 PROCEDURE — 94640 AIRWAY INHALATION TREATMENT: CPT

## 2024-05-11 PROCEDURE — 92526 ORAL FUNCTION THERAPY: CPT | Mod: GN

## 2024-05-11 PROCEDURE — 85027 COMPLETE CBC AUTOMATED: CPT | Performed by: NURSE PRACTITIONER

## 2024-05-11 PROCEDURE — 94664 DEMO&/EVAL PT USE INHALER: CPT

## 2024-05-11 PROCEDURE — 80053 COMPREHEN METABOLIC PANEL: CPT | Performed by: NURSE PRACTITIONER

## 2024-05-11 PROCEDURE — 99232 SBSQ HOSP IP/OBS MODERATE 35: CPT | Performed by: INTERNAL MEDICINE

## 2024-05-11 PROCEDURE — 83735 ASSAY OF MAGNESIUM: CPT | Performed by: NURSE PRACTITIONER

## 2024-05-11 PROCEDURE — 71045 X-RAY EXAM CHEST 1 VIEW: CPT

## 2024-05-11 PROCEDURE — 2500000004 HC RX 250 GENERAL PHARMACY W/ HCPCS (ALT 636 FOR OP/ED): Performed by: NURSE PRACTITIONER

## 2024-05-11 PROCEDURE — 2500000005 HC RX 250 GENERAL PHARMACY W/O HCPCS: Performed by: NURSE PRACTITIONER

## 2024-05-11 PROCEDURE — 2500000002 HC RX 250 W HCPCS SELF ADMINISTERED DRUGS (ALT 637 FOR MEDICARE OP, ALT 636 FOR OP/ED): Mod: MUE | Performed by: INTERNAL MEDICINE

## 2024-05-11 PROCEDURE — 83880 ASSAY OF NATRIURETIC PEPTIDE: CPT | Performed by: INTERNAL MEDICINE

## 2024-05-11 PROCEDURE — 2500000004 HC RX 250 GENERAL PHARMACY W/ HCPCS (ALT 636 FOR OP/ED): Performed by: FAMILY MEDICINE

## 2024-05-11 PROCEDURE — C9113 INJ PANTOPRAZOLE SODIUM, VIA: HCPCS | Performed by: NURSE PRACTITIONER

## 2024-05-11 PROCEDURE — 1100000001 HC PRIVATE ROOM DAILY

## 2024-05-11 PROCEDURE — 71045 X-RAY EXAM CHEST 1 VIEW: CPT | Performed by: RADIOLOGY

## 2024-05-11 PROCEDURE — 36415 COLL VENOUS BLD VENIPUNCTURE: CPT | Performed by: NURSE PRACTITIONER

## 2024-05-11 RX ORDER — FUROSEMIDE 10 MG/ML
40 INJECTION INTRAMUSCULAR; INTRAVENOUS ONCE
Status: COMPLETED | OUTPATIENT
Start: 2024-05-11 | End: 2024-05-11

## 2024-05-11 RX ORDER — IPRATROPIUM BROMIDE AND ALBUTEROL SULFATE 2.5; .5 MG/3ML; MG/3ML
3 SOLUTION RESPIRATORY (INHALATION) EVERY 2 HOUR PRN
Status: DISCONTINUED | OUTPATIENT
Start: 2024-05-11 | End: 2024-05-16 | Stop reason: HOSPADM

## 2024-05-11 RX ORDER — IPRATROPIUM BROMIDE AND ALBUTEROL SULFATE 2.5; .5 MG/3ML; MG/3ML
3 SOLUTION RESPIRATORY (INHALATION)
Status: DISCONTINUED | OUTPATIENT
Start: 2024-05-11 | End: 2024-05-11

## 2024-05-11 RX ADMIN — PANTOPRAZOLE SODIUM 40 MG: 40 INJECTION, POWDER, FOR SOLUTION INTRAVENOUS at 05:15

## 2024-05-11 RX ADMIN — IPRATROPIUM BROMIDE AND ALBUTEROL SULFATE 3 ML: .5; 3 SOLUTION RESPIRATORY (INHALATION) at 11:51

## 2024-05-11 RX ADMIN — PIPERACILLIN SODIUM AND TAZOBACTAM SODIUM 2.25 G: 2; .25 INJECTION, SOLUTION INTRAVENOUS at 05:14

## 2024-05-11 RX ADMIN — PIPERACILLIN SODIUM AND TAZOBACTAM SODIUM 2.25 G: 2; .25 INJECTION, SOLUTION INTRAVENOUS at 21:02

## 2024-05-11 RX ADMIN — MORPHINE SULFATE 1 MG: 2 INJECTION, SOLUTION INTRAMUSCULAR; INTRAVENOUS at 14:40

## 2024-05-11 RX ADMIN — FUROSEMIDE 40 MG: 10 INJECTION, SOLUTION INTRAMUSCULAR; INTRAVENOUS at 13:45

## 2024-05-11 RX ADMIN — IPRATROPIUM BROMIDE AND ALBUTEROL SULFATE 3 ML: 2.5; .5 SOLUTION RESPIRATORY (INHALATION) at 14:38

## 2024-05-11 RX ADMIN — DEXTROSE AND SODIUM CHLORIDE 75 ML/HR: 5; 900 INJECTION, SOLUTION INTRAVENOUS at 03:33

## 2024-05-11 RX ADMIN — CARBOXYMETHYLCELLULOSE SODIUM 2 DROP: 0.5 SOLUTION/ DROPS OPHTHALMIC at 21:02

## 2024-05-11 RX ADMIN — CARBOXYMETHYLCELLULOSE SODIUM 2 DROP: 0.5 SOLUTION/ DROPS OPHTHALMIC at 11:05

## 2024-05-11 RX ADMIN — PIPERACILLIN SODIUM AND TAZOBACTAM SODIUM 2.25 G: 2; .25 INJECTION, SOLUTION INTRAVENOUS at 12:00

## 2024-05-11 ASSESSMENT — PAIN SCALES - GENERAL
PAINLEVEL_OUTOF10: 0 - NO PAIN
PAINLEVEL_OUTOF10: 6
PAINLEVEL_OUTOF10: 0 - NO PAIN
PAINLEVEL_OUTOF10: 0 - NO PAIN

## 2024-05-11 ASSESSMENT — COGNITIVE AND FUNCTIONAL STATUS - GENERAL
HELP NEEDED FOR BATHING: TOTAL
DAILY ACTIVITIY SCORE: 6
TURNING FROM BACK TO SIDE WHILE IN FLAT BAD: TOTAL
MOVING TO AND FROM BED TO CHAIR: TOTAL
WALKING IN HOSPITAL ROOM: TOTAL
CLIMB 3 TO 5 STEPS WITH RAILING: TOTAL
MOVING FROM LYING ON BACK TO SITTING ON SIDE OF FLAT BED WITH BEDRAILS: TOTAL
PERSONAL GROOMING: TOTAL
DRESSING REGULAR UPPER BODY CLOTHING: TOTAL
TOILETING: TOTAL
EATING MEALS: TOTAL
DRESSING REGULAR LOWER BODY CLOTHING: TOTAL

## 2024-05-11 ASSESSMENT — PAIN - FUNCTIONAL ASSESSMENT
PAIN_FUNCTIONAL_ASSESSMENT: 0-10
PAIN_FUNCTIONAL_ASSESSMENT: 0-10

## 2024-05-11 ASSESSMENT — PAIN SCALES - WONG BAKER: WONGBAKER_NUMERICALRESPONSE: NO HURT

## 2024-05-11 NOTE — PROGRESS NOTES
Juanis Cornejo is a 89 y.o. female     Covering for Dr. Agarwal  Patient's daughter by the bedside  Patient appears quite wheezy and crackles on auscultation    Review of Systems           Vitals:    05/11/24 1151   BP:    Pulse:    Resp:    Temp:    SpO2: 93%        Scheduled medications  amLODIPine, 10 mg, oral, q AM  aspirin, 81 mg, oral, Daily  atorvastatin, 40 mg, oral, Nightly  busPIRone, 10 mg, oral, TID  docusate sodium, 100 mg, oral, BID  [Held by provider] enoxaparin, 40 mg, subcutaneous, q24h  [Held by provider] furosemide, 40 mg, oral, Daily  iohexol, 85 mL, intravenous, Once in imaging  ipratropium-albuteroL, 3 mL, nebulization, q6h  lisinopril, 20 mg, oral, Daily  lubricating eye drops, 2 drop, Both Eyes, BID  mirtazapine, 15 mg, oral, q PM  pantoprazole, 40 mg, oral, Daily before breakfast   Or  pantoprazole, 40 mg, intravenous, Daily before breakfast  piperacillin-tazobactam, 2.25 g, intravenous, q6h  risperiDONE, 0.25 mg, oral, BID  Tc-99m-albumin, 5.7 millicurie, intravenous, Once in imaging      Continuous medications  D5 % and 0.9 % sodium chloride, 75 mL/hr, Last Rate: 75 mL/hr (05/11/24 0645)      PRN medications  PRN medications: acetaminophen, hydrALAZINE, melatonin, morphine, ondansetron, polyethylene glycol, risperiDONE    Lab Review   Results from last 7 days   Lab Units 05/11/24  0640 05/10/24  0639 05/09/24  0552   WBC AUTO x10*3/uL 5.4 10.4 7.8   HEMOGLOBIN g/dL 9.9* 11.1* 11.2*   HEMATOCRIT % 30.9* 34.0* 35.9*   PLATELETS AUTO x10*3/uL 148* 178 160     Results from last 7 days   Lab Units 05/11/24  0640 05/10/24  0639 05/09/24  0552   SODIUM mmol/L 140 140 139   POTASSIUM mmol/L 3.5 3.4* 3.7   CHLORIDE mmol/L 113* 110* 109*   CO2 mmol/L 21 23 22   BUN mg/dL 11 15 17   CREATININE mg/dL 0.98 1.19* 1.12*   CALCIUM mg/dL 7.3* 7.4* 7.3*   PROTEIN TOTAL g/dL 4.6* 5.1* 4.6*   BILIRUBIN TOTAL mg/dL 0.9 1.2 0.9   ALK PHOS U/L 64 71 67   ALT U/L 25 28 24   AST U/L 39 39 30   GLUCOSE mg/dL 119* 114*  98            XR chest 2 views   Final Result   Mild ongoing CHF as described.        MACRO:   None        Signed by: Terence Ya 5/10/2024 1:02 PM   Dictation workstation:   [id number]      FL modified barium swallow study   Final Result   Penetration with thin liquid and nectar consistencies. No other   penetration during this exam. No aspiration during this exam. Please   see above for details.        MACRO:   None        Signed by: Terence Ya 5/10/2024 2:31 PM   Dictation workstation:   [id number]      NM hepatobiliary   Final Result   Delayed filling of the gallbladder with no sign of cystic duct   obstruction/acute cholecystitis.        I personally reviewed the images/study and I agree with the findings   as stated by Brennon Benson MD (Radiology Resident).   This study was interpreted at [place], [place].        MACRO:   None        Signed by: Garett Ricks 5/8/2024 4:07 PM   Dictation workstation:   [id number]      US gallbladder   Final Result   Cholelithiasis and possible acute cholecystitis without biliary duct   dilatation. Correlate clinically and if indicated confirm with   hepatobiliary scan.             Signed by: Mushtaq Fitzgerald 5/7/2024 4:16 PM   Dictation workstation:   [id number]      CT abdomen pelvis wo IV contrast   Final Result   Small nonspecific bilateral pleural effusions with associated   posterior bibasilar lung atelectasis.        Punctate nonobstructing stone in the upper pole of the left kidney.        Scattered colonic diverticulosis without acute associated   inflammation.        Nonspecific mesenteric adenopathy with associated fat stranding   medial to the cecum and right colon. Question mesenteric adenitis.        Nonspecific gallbladder wall thickening with mild adjacent edema but   without calcified stone. Cannot exclude noncalcified gallstones.   Recommend further evaluation with gallbladder ultrasound  to exclude   acute cholecystitis.        Mild nonspecific abdominal and pelvic ascites as described.        Previous hysterectomy.        Arthritic changes in the spine as described.        MACRO:   None        Signed by: Terence Ya 5/7/2024 2:57 PM   Dictation workstation:   [id number]      XR chest 1 view    (Results Pending)         Physical Exam     Constitutional   General appearance: Lethargic    Pulmonary   Respiratory assessment: Coarse rhonchi bilaterally  Cardiovascular   Auscultation of heart: Apical pulse normal, heart rate and rhythm normal, normal S1 and S2, no murmurs and no pericardial rub.    Exam for edema: 1+ edema  Abdomen   Abdominal Exam: Distended abdomen  Liver and Spleen exam: No hepato-splenomegaly.   Musculoskeletal     Skin inspection: Normal skin color and pigmentation, normal skin turgor and no visible rash.   Neurologic   Cranial nerves: Nerves 2-12 were intact, no focal neuro defects.     Assessment/Plan      #Acute respiratory failure  ?  Fluid overload  Check BNP  DuoNebs as needed  Repeating chest x-ray    #Cholecystitis acute versus chronic  Continue antibiotics    Son has requested DNR  DNR orders entered    #Dementia  At her baseline    #Hypertension  Stable

## 2024-05-11 NOTE — CARE PLAN
The patient's goals for the shift include      The clinical goals for the shift include pt safety will be maintained in duration of the shift.    Problem: Pain  Goal: My pain/discomfort is manageable  Flowsheets (Taken 5/11/2024 0014)  Resident's pain/discomfort is manageable:   Include resident/family/caregiver in decisions related to pain management   Offer non-pharmacological pain management interventions   Administer pain medication prior to activities that may trigger pain     Problem: Safety  Goal: I will remain free of falls  Flowsheets (Taken 5/11/2024 0014)  Resident will remain free of falls:   Apply bed/chair alarms as appropriate   Maintain bed at position as ordered (chair height, low bed)   Visual checks per facility policy     Problem: Daily Care  Goal: Daily care needs are met  Flowsheets (Taken 5/11/2024 0014)  Daily care needs are met:   Assist patient with activities of daily living as needed   Assess skin integrity/risk for skin breakdown

## 2024-05-11 NOTE — PROGRESS NOTES
Speech-Language Pathology    SLP Adult Inpatient  Speech-Language Pathology Treatment     Patient Name: Juanis Cornejo  MRN: [id number]  Today's Date: 5/11/2024  Time Calculation  Start Time: 1133  Stop Time: 1145  Time Calculation (min): 12 min         Current Problem:   1. Epigastric pain        2. Cholecystitis        3. Oropharyngeal dysphagia          Recommendations: NPO with alternate means nutrition/hydration IF poor mentation medical/respiratory status worsens with re-consult for swallow eval      PUREE DIET WITH HONEY/MODERATELY THICKENED LIQUIDS  ONLY FEED WHEN AWAKE AND ALERT  UPRIGHT AT 90 DEGREES FOR ALL PO  SLOW RATE AND SMALL BOLUS SIZE  RESWALLOW  FEEDING ASSISTANCE  MEDS CRUSHED IN PUREE CARRIER     Plan:  SLP Frequency: 3x per week  Duration: Current admission  SLP Discharge Recommendations: Continue skilled SLP services at the next level of care  Discussed POC: Patient, Caregiver/family (daughter in law)  Discussed Risks/Benefits: Yes, Patient, Caregiver/Family  Patient/Caregiver Agreeable: Yes  SLP - OK to Discharge: Yes      Subjective   Current Problem: Pt with poor lung sounds with observable wheezing breathing quality. On 2L NC. Poor arousal with decreased ability to follow commands/sustain alertness. Daughter in law at bedside. Per RN, unable to administer medications PO this AM d/t poor arousal and chest sounds/breathing sounds.      Objective   Pt seen at bedside for dysphagia tx following MBS previous date. Rec's for puree/honey diet. Pt difficult to wake for PO trials this date. Positioned upright in bed. Able to open eyes and intermittently respond to SLP cues/questions. Daughter in law reports using oral swab in honey/moderately thick liquids to administer PO. SLP extensively educated on spread of bacteria with oral swabs and thickened liquids as well as increased instance of aspiration when feeding not upright 90 degrees or alert. Daughter in law verbalized understanding. RN reports  education to family on NOT feeding when pt is not awake/alert.    Given x5 half tsp sips of honey/moderately thick liquids, pt with observable wheeze breathing and x2 instances intermittent coughing/throat clearing. Pt demonstrating multiple swallows per tsp bolus. SLP questions given current medical status, pt would NOT be able to sustain nutrition/hydration through PO diet.    D/t dementia dx, poor nutritional intake, and CKD, recommend consideration of upgrade to thin liquids for increased hydration, reduced impact on kidney function, and improved quality of life vs. NPO and alternate means nutrition/hydration. Recommend GOC discussion if low arousal and decline persist/worsen. Advanced age with likelihood of age appropriate penetration per MBS, however, suspect recent decline in respiratory/swallow function.    Therapeutic Swallow:  Liquid Diet Recommendations: Honey thick/liquidised/moderately thick (IDDS Level 3)

## 2024-05-12 LAB
ALBUMIN SERPL BCP-MCNC: 3.1 G/DL (ref 3.4–5)
ALP SERPL-CCNC: 67 U/L (ref 33–136)
ALT SERPL W P-5'-P-CCNC: 29 U/L (ref 7–45)
ANION GAP SERPL CALC-SCNC: 11 MMOL/L (ref 10–20)
AST SERPL W P-5'-P-CCNC: 48 U/L (ref 9–39)
BACTERIA BLD CULT: NORMAL
BACTERIA BLD CULT: NORMAL
BILIRUB SERPL-MCNC: 1.2 MG/DL (ref 0–1.2)
BUN SERPL-MCNC: 11 MG/DL (ref 6–23)
CALCIUM SERPL-MCNC: 7.6 MG/DL (ref 8.6–10.3)
CHLORIDE SERPL-SCNC: 108 MMOL/L (ref 98–107)
CO2 SERPL-SCNC: 24 MMOL/L (ref 21–32)
CREAT SERPL-MCNC: 0.98 MG/DL (ref 0.5–1.05)
EGFRCR SERPLBLD CKD-EPI 2021: 53 ML/MIN/1.73M*2
ERYTHROCYTE [DISTWIDTH] IN BLOOD BY AUTOMATED COUNT: 14.7 % (ref 11.5–14.5)
GLUCOSE SERPL-MCNC: 86 MG/DL (ref 74–99)
HCT VFR BLD AUTO: 35.5 % (ref 36–46)
HGB BLD-MCNC: 11 G/DL (ref 12–16)
MAGNESIUM SERPL-MCNC: 1.9 MG/DL (ref 1.6–2.4)
MCH RBC QN AUTO: 34 PG (ref 26–34)
MCHC RBC AUTO-ENTMCNC: 31 G/DL (ref 32–36)
MCV RBC AUTO: 110 FL (ref 80–100)
NRBC BLD-RTO: 0 /100 WBCS (ref 0–0)
PLATELET # BLD AUTO: 169 X10*3/UL (ref 150–450)
POTASSIUM SERPL-SCNC: 3.2 MMOL/L (ref 3.5–5.3)
PROT SERPL-MCNC: 5.3 G/DL (ref 6.4–8.2)
RBC # BLD AUTO: 3.24 X10*6/UL (ref 4–5.2)
SODIUM SERPL-SCNC: 140 MMOL/L (ref 136–145)
WBC # BLD AUTO: 6.6 X10*3/UL (ref 4.4–11.3)

## 2024-05-12 PROCEDURE — 36415 COLL VENOUS BLD VENIPUNCTURE: CPT | Performed by: NURSE PRACTITIONER

## 2024-05-12 PROCEDURE — 1100000001 HC PRIVATE ROOM DAILY

## 2024-05-12 PROCEDURE — 99232 SBSQ HOSP IP/OBS MODERATE 35: CPT | Performed by: INTERNAL MEDICINE

## 2024-05-12 PROCEDURE — 2500000005 HC RX 250 GENERAL PHARMACY W/O HCPCS: Performed by: NURSE PRACTITIONER

## 2024-05-12 PROCEDURE — 85027 COMPLETE CBC AUTOMATED: CPT | Performed by: NURSE PRACTITIONER

## 2024-05-12 PROCEDURE — 94640 AIRWAY INHALATION TREATMENT: CPT

## 2024-05-12 PROCEDURE — 2500000004 HC RX 250 GENERAL PHARMACY W/ HCPCS (ALT 636 FOR OP/ED): Performed by: INTERNAL MEDICINE

## 2024-05-12 PROCEDURE — 2500000004 HC RX 250 GENERAL PHARMACY W/ HCPCS (ALT 636 FOR OP/ED): Performed by: FAMILY MEDICINE

## 2024-05-12 PROCEDURE — 82247 BILIRUBIN TOTAL: CPT | Performed by: NURSE PRACTITIONER

## 2024-05-12 PROCEDURE — 2500000001 HC RX 250 WO HCPCS SELF ADMINISTERED DRUGS (ALT 637 FOR MEDICARE OP): Performed by: NURSE PRACTITIONER

## 2024-05-12 PROCEDURE — C9113 INJ PANTOPRAZOLE SODIUM, VIA: HCPCS | Performed by: NURSE PRACTITIONER

## 2024-05-12 PROCEDURE — 2500000002 HC RX 250 W HCPCS SELF ADMINISTERED DRUGS (ALT 637 FOR MEDICARE OP, ALT 636 FOR OP/ED): Mod: MUE | Performed by: INTERNAL MEDICINE

## 2024-05-12 PROCEDURE — 2500000006 HC RX 250 W HCPCS SELF ADMINISTERED DRUGS (ALT 637 FOR ALL PAYERS): Mod: MUE | Performed by: INTERNAL MEDICINE

## 2024-05-12 PROCEDURE — 2500000004 HC RX 250 GENERAL PHARMACY W/ HCPCS (ALT 636 FOR OP/ED): Performed by: NURSE PRACTITIONER

## 2024-05-12 PROCEDURE — 83735 ASSAY OF MAGNESIUM: CPT | Performed by: NURSE PRACTITIONER

## 2024-05-12 RX ORDER — POTASSIUM CHLORIDE 20 MEQ/1
20 TABLET, EXTENDED RELEASE ORAL ONCE
Status: COMPLETED | OUTPATIENT
Start: 2024-05-12 | End: 2024-05-12

## 2024-05-12 RX ORDER — FUROSEMIDE 10 MG/ML
40 INJECTION INTRAMUSCULAR; INTRAVENOUS ONCE
Status: COMPLETED | OUTPATIENT
Start: 2024-05-12 | End: 2024-05-12

## 2024-05-12 RX ADMIN — POTASSIUM CHLORIDE 20 MEQ: 1500 TABLET, EXTENDED RELEASE ORAL at 13:24

## 2024-05-12 RX ADMIN — CARBOXYMETHYLCELLULOSE SODIUM 2 DROP: 0.5 SOLUTION/ DROPS OPHTHALMIC at 20:54

## 2024-05-12 RX ADMIN — BUSPIRONE HYDROCHLORIDE 10 MG: 10 TABLET ORAL at 16:21

## 2024-05-12 RX ADMIN — ACETAMINOPHEN 650 MG: 325 TABLET ORAL at 20:48

## 2024-05-12 RX ADMIN — FUROSEMIDE 40 MG: 10 INJECTION, SOLUTION INTRAMUSCULAR; INTRAVENOUS at 13:20

## 2024-05-12 RX ADMIN — MORPHINE SULFATE 1 MG: 2 INJECTION, SOLUTION INTRAMUSCULAR; INTRAVENOUS at 16:39

## 2024-05-12 RX ADMIN — PIPERACILLIN SODIUM AND TAZOBACTAM SODIUM 2.25 G: 2; .25 INJECTION, SOLUTION INTRAVENOUS at 14:10

## 2024-05-12 RX ADMIN — ASPIRIN 81 MG: 81 TABLET, COATED ORAL at 16:20

## 2024-05-12 RX ADMIN — IPRATROPIUM BROMIDE AND ALBUTEROL SULFATE 3 ML: 2.5; .5 SOLUTION RESPIRATORY (INHALATION) at 16:26

## 2024-05-12 RX ADMIN — AMLODIPINE BESYLATE 10 MG: 10 TABLET ORAL at 16:22

## 2024-05-12 RX ADMIN — RISPERIDONE 0.25 MG: 0.25 TABLET, FILM COATED ORAL at 21:00

## 2024-05-12 RX ADMIN — PIPERACILLIN SODIUM AND TAZOBACTAM SODIUM 2.25 G: 2; .25 INJECTION, SOLUTION INTRAVENOUS at 02:59

## 2024-05-12 RX ADMIN — PANTOPRAZOLE SODIUM 40 MG: 40 INJECTION, POWDER, FOR SOLUTION INTRAVENOUS at 07:00

## 2024-05-12 RX ADMIN — PIPERACILLIN SODIUM AND TAZOBACTAM SODIUM 2.25 G: 2; .25 INJECTION, SOLUTION INTRAVENOUS at 20:39

## 2024-05-12 RX ADMIN — LISINOPRIL 20 MG: 20 TABLET ORAL at 16:22

## 2024-05-12 ASSESSMENT — PAIN SCALES - WONG BAKER
WONGBAKER_NUMERICALRESPONSE: HURTS EVEN MORE
WONGBAKER_NUMERICALRESPONSE: NO HURT
WONGBAKER_NUMERICALRESPONSE: HURTS LITTLE MORE

## 2024-05-12 ASSESSMENT — PAIN - FUNCTIONAL ASSESSMENT
PAIN_FUNCTIONAL_ASSESSMENT: CRIES (CRYING REQUIRES OXYGEN INCREASED VITAL SIGNS EXPRESSION SLEEP)
PAIN_FUNCTIONAL_ASSESSMENT: CRIES (CRYING REQUIRES OXYGEN INCREASED VITAL SIGNS EXPRESSION SLEEP)
PAIN_FUNCTIONAL_ASSESSMENT: PAINAD (PAIN ASSESSMENT IN ADVANCED DEMENTIA SCALE)

## 2024-05-12 NOTE — CARE PLAN
The patient's goals for the shift include  SUPA    The clinical goals for the shift include Pt maintain safety, maintain ease of breathing    Problem: Safety  Goal: Patient will be injury free during hospitalization  Outcome: Progressing  Goal: I will remain free of falls  Outcome: Progressing     Problem: Daily Care  Goal: Daily care needs are met  Outcome: Progressing

## 2024-05-12 NOTE — PROGRESS NOTES
Juanis Cornejo is a 89 y.o. female     Pain is little better today  Still appears overloaded  Will give another dose of Lasix    Review of Systems           Vitals:    05/12/24 1125   BP: 157/74   Pulse: 51   Resp: 16   Temp: 36.7 °C (98 °F)   SpO2: 100%        Scheduled medications  amLODIPine, 10 mg, oral, q AM  aspirin, 81 mg, oral, Daily  atorvastatin, 40 mg, oral, Nightly  busPIRone, 10 mg, oral, TID  docusate sodium, 100 mg, oral, BID  [Held by provider] enoxaparin, 40 mg, subcutaneous, q24h  [Held by provider] furosemide, 40 mg, oral, Daily  iohexol, 85 mL, intravenous, Once in imaging  lisinopril, 20 mg, oral, Daily  lubricating eye drops, 2 drop, Both Eyes, BID  mirtazapine, 15 mg, oral, q PM  pantoprazole, 40 mg, oral, Daily before breakfast   Or  pantoprazole, 40 mg, intravenous, Daily before breakfast  piperacillin-tazobactam, 2.25 g, intravenous, q6h  potassium chloride CR, 20 mEq, oral, Once  risperiDONE, 0.25 mg, oral, BID  Tc-99m-albumin, 5.7 millicurie, intravenous, Once in imaging      Continuous medications  [Held by provider] D5 % and 0.9 % sodium chloride, 75 mL/hr, Last Rate: Stopped (05/11/24 1200)      PRN medications  PRN medications: acetaminophen, hydrALAZINE, ipratropium-albuteroL, melatonin, morphine, ondansetron, polyethylene glycol, risperiDONE    Lab Review   Results from last 7 days   Lab Units 05/12/24  0621 05/11/24  0640 05/10/24  0639   WBC AUTO x10*3/uL 6.6 5.4 10.4   HEMOGLOBIN g/dL 11.0* 9.9* 11.1*   HEMATOCRIT % 35.5* 30.9* 34.0*   PLATELETS AUTO x10*3/uL 169 148* 178     Results from last 7 days   Lab Units 05/12/24  0621 05/11/24  0640 05/10/24  0639   SODIUM mmol/L 140 140 140   POTASSIUM mmol/L 3.2* 3.5 3.4*   CHLORIDE mmol/L 108* 113* 110*   CO2 mmol/L 24 21 23   BUN mg/dL 11 11 15   CREATININE mg/dL 0.98 0.98 1.19*   CALCIUM mg/dL 7.6* 7.3* 7.4*   PROTEIN TOTAL g/dL 5.3* 4.6* 5.1*   BILIRUBIN TOTAL mg/dL 1.2 0.9 1.2   ALK PHOS U/L 67 64 71   ALT U/L 29 25 28   AST U/L 48*  39 39   GLUCOSE mg/dL 86 119* 114*            XR chest 1 view   Final Result   Persistent airspace consolidation/infiltrate left lower lung zone   with stable left effusion.        Increasing infiltrate right mid lung zone and at right lung base with   persistent small right pleural effusion.        Worsening pulmonary vascular congestion/CHF.        Signed by: Catherine Freeman 5/11/2024 1:27 PM   Dictation workstation:   [id number]      XR chest 2 views   Final Result   Mild ongoing CHF as described.        MACRO:   None        Signed by: Terence Ya 5/10/2024 1:02 PM   Dictation workstation:   [id number]      FL modified barium swallow study   Final Result   Penetration with thin liquid and nectar consistencies. No other   penetration during this exam. No aspiration during this exam. Please   see above for details.        MACRO:   None        Signed by: Terence Ya 5/10/2024 2:31 PM   Dictation workstation:   [id number]      NM hepatobiliary   Final Result   Delayed filling of the gallbladder with no sign of cystic duct   obstruction/acute cholecystitis.        I personally reviewed the images/study and I agree with the findings   as stated by Brennon Benson MD (Radiology Resident).   This study was interpreted at [place], [place].        MACRO:   None        Signed by: Garett Ricks 5/8/2024 4:07 PM   Dictation workstation:   [id number]      US gallbladder   Final Result   Cholelithiasis and possible acute cholecystitis without biliary duct   dilatation. Correlate clinically and if indicated confirm with   hepatobiliary scan.             Signed by: Mushtaq Fitzgerald 5/7/2024 4:16 PM   Dictation workstation:   [id number]      CT abdomen pelvis wo IV contrast   Final Result   Small nonspecific bilateral pleural effusions with associated   posterior bibasilar lung atelectasis.        Punctate nonobstructing stone in the upper pole of the left  kidney.        Scattered colonic diverticulosis without acute associated   inflammation.        Nonspecific mesenteric adenopathy with associated fat stranding   medial to the cecum and right colon. Question mesenteric adenitis.        Nonspecific gallbladder wall thickening with mild adjacent edema but   without calcified stone. Cannot exclude noncalcified gallstones.   Recommend further evaluation with gallbladder ultrasound to exclude   acute cholecystitis.        Mild nonspecific abdominal and pelvic ascites as described.        Previous hysterectomy.        Arthritic changes in the spine as described.        MACRO:   None        Signed by: Terence Ya 5/7/2024 2:57 PM   Dictation workstation:   [id number]            Physical Exam     Constitutional   General appearance: Awake    Pulmonary   Respiratory assessment: Coarse rhonchi bilaterally  Cardiovascular   Auscultation of heart: Apical pulse normal, heart rate and rhythm normal, normal S1 and S2, no murmurs and no pericardial rub.    Exam for edema: 1+ edema  Abdomen   Abdominal Exam: Distended abdomen  Liver and Spleen exam: No hepato-splenomegaly.   Musculoskeletal     Skin inspection: Normal skin color and pigmentation, normal skin turgor and no visible rash.   Neurologic   Cranial nerves: Nerves 2-12 were intact, no focal neuro defects.     Assessment/Plan      #Acute respiratory failure  #Acute CHF  Lasix 40 mg IV x 1  DuoNebs as needed    #Cholecystitis acute versus chronic  Continue antibiotics    Son has requested DNR  DNR orders entered    #Dementia  At her baseline    #Hypertension  Stable

## 2024-05-13 LAB
ALBUMIN SERPL BCP-MCNC: 2.6 G/DL (ref 3.4–5)
ALP SERPL-CCNC: 68 U/L (ref 33–136)
ALT SERPL W P-5'-P-CCNC: 27 U/L (ref 7–45)
ANION GAP SERPL CALC-SCNC: 11 MMOL/L (ref 10–20)
AST SERPL W P-5'-P-CCNC: 48 U/L (ref 9–39)
BILIRUB SERPL-MCNC: 1.1 MG/DL (ref 0–1.2)
BUN SERPL-MCNC: 13 MG/DL (ref 6–23)
CALCIUM SERPL-MCNC: 7.6 MG/DL (ref 8.6–10.3)
CHLORIDE SERPL-SCNC: 106 MMOL/L (ref 98–107)
CO2 SERPL-SCNC: 29 MMOL/L (ref 21–32)
CREAT SERPL-MCNC: 1.04 MG/DL (ref 0.5–1.05)
EGFRCR SERPLBLD CKD-EPI 2021: 50 ML/MIN/1.73M*2
ERYTHROCYTE [DISTWIDTH] IN BLOOD BY AUTOMATED COUNT: 14.4 % (ref 11.5–14.5)
GLUCOSE SERPL-MCNC: 81 MG/DL (ref 74–99)
HCT VFR BLD AUTO: 34.1 % (ref 36–46)
HGB BLD-MCNC: 11.2 G/DL (ref 12–16)
MAGNESIUM SERPL-MCNC: 1.9 MG/DL (ref 1.6–2.4)
MCH RBC QN AUTO: 33.1 PG (ref 26–34)
MCHC RBC AUTO-ENTMCNC: 32.8 G/DL (ref 32–36)
MCV RBC AUTO: 101 FL (ref 80–100)
NRBC BLD-RTO: 0 /100 WBCS (ref 0–0)
PLATELET # BLD AUTO: 187 X10*3/UL (ref 150–450)
POTASSIUM SERPL-SCNC: 3.1 MMOL/L (ref 3.5–5.3)
PROT SERPL-MCNC: 4.5 G/DL (ref 6.4–8.2)
RBC # BLD AUTO: 3.38 X10*6/UL (ref 4–5.2)
SODIUM SERPL-SCNC: 143 MMOL/L (ref 136–145)
WBC # BLD AUTO: 7.2 X10*3/UL (ref 4.4–11.3)

## 2024-05-13 PROCEDURE — 36415 COLL VENOUS BLD VENIPUNCTURE: CPT | Performed by: NURSE PRACTITIONER

## 2024-05-13 PROCEDURE — 80053 COMPREHEN METABOLIC PANEL: CPT | Performed by: NURSE PRACTITIONER

## 2024-05-13 PROCEDURE — 2500000001 HC RX 250 WO HCPCS SELF ADMINISTERED DRUGS (ALT 637 FOR MEDICARE OP): Performed by: NURSE PRACTITIONER

## 2024-05-13 PROCEDURE — C9113 INJ PANTOPRAZOLE SODIUM, VIA: HCPCS | Performed by: NURSE PRACTITIONER

## 2024-05-13 PROCEDURE — 92526 ORAL FUNCTION THERAPY: CPT | Mod: GN

## 2024-05-13 PROCEDURE — 83735 ASSAY OF MAGNESIUM: CPT | Performed by: NURSE PRACTITIONER

## 2024-05-13 PROCEDURE — 2500000004 HC RX 250 GENERAL PHARMACY W/ HCPCS (ALT 636 FOR OP/ED): Performed by: NURSE PRACTITIONER

## 2024-05-13 PROCEDURE — 1100000001 HC PRIVATE ROOM DAILY

## 2024-05-13 PROCEDURE — 85027 COMPLETE CBC AUTOMATED: CPT | Performed by: NURSE PRACTITIONER

## 2024-05-13 PROCEDURE — 2500000005 HC RX 250 GENERAL PHARMACY W/O HCPCS: Performed by: NURSE PRACTITIONER

## 2024-05-13 PROCEDURE — 99223 1ST HOSP IP/OBS HIGH 75: CPT | Performed by: NURSE PRACTITIONER

## 2024-05-13 PROCEDURE — 99497 ADVNCD CARE PLAN 30 MIN: CPT | Performed by: NURSE PRACTITIONER

## 2024-05-13 PROCEDURE — 2500000004 HC RX 250 GENERAL PHARMACY W/ HCPCS (ALT 636 FOR OP/ED): Performed by: FAMILY MEDICINE

## 2024-05-13 RX ORDER — POTASSIUM CHLORIDE 14.9 MG/ML
20 INJECTION INTRAVENOUS
Status: ACTIVE | OUTPATIENT
Start: 2024-05-13 | End: 2024-05-13

## 2024-05-13 RX ORDER — MIRTAZAPINE 15 MG/1
7.5 TABLET, FILM COATED ORAL EVERY EVENING
Status: DISCONTINUED | OUTPATIENT
Start: 2024-05-13 | End: 2024-05-16 | Stop reason: HOSPADM

## 2024-05-13 RX ADMIN — PIPERACILLIN SODIUM AND TAZOBACTAM SODIUM 2.25 G: 2; .25 INJECTION, SOLUTION INTRAVENOUS at 09:35

## 2024-05-13 RX ADMIN — ATORVASTATIN CALCIUM 40 MG: 40 TABLET, FILM COATED ORAL at 21:00

## 2024-05-13 RX ADMIN — CARBOXYMETHYLCELLULOSE SODIUM 2 DROP: 0.5 SOLUTION/ DROPS OPHTHALMIC at 21:49

## 2024-05-13 RX ADMIN — RISPERIDONE 0.25 MG: 0.25 TABLET, FILM COATED ORAL at 21:48

## 2024-05-13 RX ADMIN — PANTOPRAZOLE SODIUM 40 MG: 40 INJECTION, POWDER, FOR SOLUTION INTRAVENOUS at 06:03

## 2024-05-13 RX ADMIN — BUSPIRONE HYDROCHLORIDE 10 MG: 10 TABLET ORAL at 09:33

## 2024-05-13 RX ADMIN — ASPIRIN 81 MG: 81 TABLET, COATED ORAL at 09:34

## 2024-05-13 RX ADMIN — BUSPIRONE HYDROCHLORIDE 10 MG: 10 TABLET ORAL at 16:06

## 2024-05-13 RX ADMIN — MORPHINE SULFATE 1 MG: 2 INJECTION, SOLUTION INTRAMUSCULAR; INTRAVENOUS at 02:47

## 2024-05-13 RX ADMIN — DOCUSATE SODIUM 100 MG: 100 CAPSULE, LIQUID FILLED ORAL at 09:33

## 2024-05-13 RX ADMIN — PIPERACILLIN SODIUM AND TAZOBACTAM SODIUM 2.25 G: 2; .25 INJECTION, SOLUTION INTRAVENOUS at 02:42

## 2024-05-13 RX ADMIN — PIPERACILLIN SODIUM AND TAZOBACTAM SODIUM 2.25 G: 2; .25 INJECTION, SOLUTION INTRAVENOUS at 21:49

## 2024-05-13 RX ADMIN — RISPERIDONE 0.25 MG: 0.25 TABLET, FILM COATED ORAL at 09:33

## 2024-05-13 RX ADMIN — AMLODIPINE BESYLATE 10 MG: 10 TABLET ORAL at 09:34

## 2024-05-13 RX ADMIN — ACETAMINOPHEN 650 MG: 325 TABLET ORAL at 21:48

## 2024-05-13 RX ADMIN — MIRTAZAPINE 7.5 MG: 15 TABLET, FILM COATED ORAL at 21:48

## 2024-05-13 RX ADMIN — CARBOXYMETHYLCELLULOSE SODIUM 2 DROP: 0.5 SOLUTION/ DROPS OPHTHALMIC at 09:35

## 2024-05-13 RX ADMIN — LISINOPRIL 20 MG: 20 TABLET ORAL at 09:33

## 2024-05-13 RX ADMIN — PIPERACILLIN SODIUM AND TAZOBACTAM SODIUM 2.25 G: 2; .25 INJECTION, SOLUTION INTRAVENOUS at 16:06

## 2024-05-13 RX ADMIN — BUSPIRONE HYDROCHLORIDE 10 MG: 10 TABLET ORAL at 21:48

## 2024-05-13 ASSESSMENT — ENCOUNTER SYMPTOMS
WHEEZING: 1
EYES NEGATIVE: 1
HEMATOLOGIC/LYMPHATIC NEGATIVE: 1
ENDOCRINE NEGATIVE: 1
WEAKNESS: 1
CARDIOVASCULAR NEGATIVE: 1
ALLERGIC/IMMUNOLOGIC NEGATIVE: 1
COUGH: 1
ABDOMINAL PAIN: 1

## 2024-05-13 ASSESSMENT — PAIN DESCRIPTION - LOCATION
LOCATION: ABDOMEN
LOCATION: ABDOMEN

## 2024-05-13 ASSESSMENT — PAIN SCALES - WONG BAKER
WONGBAKER_NUMERICALRESPONSE: HURTS LITTLE BIT
WONGBAKER_NUMERICALRESPONSE: HURTS EVEN MORE
WONGBAKER_NUMERICALRESPONSE: HURTS LITTLE MORE

## 2024-05-13 ASSESSMENT — PAIN - FUNCTIONAL ASSESSMENT
PAIN_FUNCTIONAL_ASSESSMENT: PAINAD (PAIN ASSESSMENT IN ADVANCED DEMENTIA SCALE)

## 2024-05-13 ASSESSMENT — PAIN SCALES - GENERAL: PAINLEVEL_OUTOF10: 0 - NO PAIN

## 2024-05-13 NOTE — CARE PLAN
The patient's goals for the shift include  Pt will remain HDS and pain controlled     The clinical goals for the shift include Pt remain safe and free of injury    Problem: Pain  Goal: My pain/discomfort is manageable  Outcome: Progressing     Problem: Safety  Goal: Patient will be injury free during hospitalization  Outcome: Progressing  Goal: I will remain free of falls  Outcome: Progressing     Problem: Daily Care  Goal: Daily care needs are met  Outcome: Progressing     Problem: Psychosocial Needs  Goal: Demonstrates ability to cope with hospitalization/illness  Outcome: Progressing  Goal: Collaborate with me, my family, and caregiver to identify my specific goals  Outcome: Progressing

## 2024-05-13 NOTE — PROGRESS NOTES
Speech-Language Pathology    Inpatient  Speech-Language Pathology Dysphagia Treatment    Patient Name: Juanis Cornejo  MRN: [id number]  : 6/15/1928  Today's Date: 24   Time Calculation  Start Time: 1056  Stop Time: 1125  Time Calculation (min): 29 min        Subjective:     Alert and verbal.    Objective:  Goals:   Pt. to tolerate least restrictive diet without pulmonary compromise     Therapeutic Swallow Intervention : PO Trials    Treatment Results/ Swallow Comments: Patient seen to determine ongoing diet tolerance. Patient fully alert and verbal. Generalized weakness noted. SLP adjusted HOB to achieve full upright position for po trials. Patient attempting to talk given initial bolus honey liquids presented via tsp, which escaped anteriorly from oral cavity. Instructed patient to refrain from talking during po trials. Bolus formation slowed with subsequent boluses with spontaneous reswallows completed with boluses tested. No overt s/s aspiration detected. Upper airway congestion noted, which did not appear to change with po presentations. Vocal quality remained clear with patient reporting satisfaction with taste of beverage provided.    Assessment:  SLP TX Intervention Outcome: Making Progress Towards Goals    Patient demonstrating adequate tolerance of honey liquids. Feeding assistance required to increase intake and reduce bolus size. Tsp presentation for liquids advised to reduce potential for airway entry. Also, advise feeding patient when alert to reduce aspiration risk. Ongoing SLP services required to reinforce swallowing strategies and to ensure diet safety.    Plan:    CONTINUE PUREE DIET WITH HONEY/MODERATELY THICK LIQUIDS  SLP FOR DYSPHAGIA MANAGEMENT    SLP TX Plan: Continue Plan of Care  SLP Plan: Skilled SLP  SLP Frequency: 3x per week  Duration: Current admission  SLP Discharge Recommendations: Continue skilled speech therapy services post discharge  Discussed POC: Patient  Discussed  Risks/Benefits: Yes  Patient/Caregiver Agreeable: Yes

## 2024-05-13 NOTE — PROGRESS NOTES
05/13/24 1013   Discharge Planning   Patient expects to be discharged to: [facility]     Once med ready patient planning on returning to .[facility] per notes from previous conversation with DON there are no barriers for the patient to return, per notes patient still receiving IV Lasix, I will continue to monitor for discharge planning. ADOD 24-48hrs.

## 2024-05-13 NOTE — CONSULTS
Inpatient consult to Palliative Care  Consult performed by: Gillian Lomeli, CASH-CNP  Consult ordered by: Newton Agarwal MD          Reason For Consult  Reason for Consult: communication / medical decision making     History Of Present Illness  Juanis Cornejo is a 89 y.o. female who presented to our ED 5/7/24 with c/o ABD pain, found to have cholecystitis and has been treated conservatively.  Has also developed a CHF exacerbation and is currently on furosemide IV.  She is already DNR/DNI, lives in memory care [facility].  Has HCPOA which lists daughter Gillian as first proxy and son Keith as second.      PMHx include Alzheimer's dementia, CKD III  Symptoms (0 - 10, Best to Worst)  [place] Symptom Assessment System  Pain Score: 0 - No pain    BM in last 48 hours? unknown             Personal/Social History  She has no history on file for tobacco use, alcohol use, and drug use.    Functional Status:  resides in memory care unit with 24 hour supervision, at present total care, requires feeding             Past Medical History  She has a past medical history of Alzheimer's dementia (Multi) (05/07/2024), Anxiety (05/07/2024), Delusional disorder (Multi) (05/07/2024), Hemochromatosis, unspecified, Hypertension (05/07/2024), Stage 3a chronic kidney disease (Multi) (05/07/2024), and TIA (transient ischemic attack) (04/18/2024).    Surgical History  She has a past surgical history that includes Cataract extraction (06/14/2013); Colonoscopy (06/14/2013); Other surgical history (06/14/2013); Carpal tunnel release (06/14/2013); Other surgical history (06/14/2013); MR angio head wo IV contrast (9/9/2013); and MR angio neck wo IV contrast (9/9/2013).     Family History  No family history on file.  Allergies  Patient has no known allergies.    Review of Systems   Unable to perform ROS: Dementia   Constitutional:         Nursing reports patient with very poor PO.  She is taking meds.   HENT: Negative.     Eyes:  Negative.    Respiratory:  Positive for cough and wheezing.    Cardiovascular: Negative.    Gastrointestinal:  Positive for abdominal pain.   Endocrine: Negative.    Genitourinary: Negative.    Skin: Negative.    Allergic/Immunologic: Negative.    Neurological:  Positive for weakness.   Hematological: Negative.    Psychiatric/Behavioral:          Did receive single dose of antipsychotic a couple of days ago for agitation but none since        Physical Exam  Constitutional:       Appearance: She is ill-appearing.   HENT:      Head: Normocephalic and atraumatic.      Nose: Nose normal.      Mouth/Throat:      Mouth: Mucous membranes are moist.      Pharynx: Oropharynx is clear.   Eyes:      Conjunctiva/sclera: Conjunctivae normal.      Pupils: Pupils are equal, round, and reactive to light.   Cardiovascular:      Rate and Rhythm: Regular rhythm.      Heart sounds: Normal heart sounds.   Pulmonary:      Effort: Pulmonary effort is normal.      Breath sounds: Wheezing and rales present.      Comments: Adventitious lung sounds throughout all fields, no cough  Abdominal:      General: Bowel sounds are normal. There is distension.      Palpations: Abdomen is soft.      Tenderness: There is abdominal tenderness.   Genitourinary:     Comments: deferred  Musculoskeletal:         General: Normal range of motion.      Cervical back: Normal range of motion.      Right lower leg: No edema.      Left lower leg: No edema.   Skin:     General: Skin is warm and dry.      Capillary Refill: Capillary refill takes 2 to 3 seconds.      Coloration: Skin is pale.   Neurological:      Mental Status: She is alert. She is disoriented.      Comments: Says hello in response to my greeting but otherwise offers no conversation or answers   Psychiatric:      Comments: Does not appear distressed or internally stimulated         Last Recorded Vitals  Blood pressure (!) 114/40, pulse 62, temperature 36.1 °C (97 °F), temperature source Temporal, resp.  "rate 18, height 1.473 m (4' 10\"), weight 54.2 kg (119 lb 7.8 oz), SpO2 96%.    Relevant Results  ,=XR chest 1 view    Result Date: 5/11/2024  Interpreted By:  Catherine Freeman, STUDY: XR CHEST 1 VIEW 5/11/2024 12:01 pm   INDICATION: Signs/Symptoms:wheezing   COMPARISON: 05/10/2024   ACCESSION NUMBER(S): [id number]   ORDERING CLINICIAN: TEA SONG   TECHNIQUE: Portable erect view of the chest   FINDINGS: There is persistent infiltrate and airspace consolidation in the left lower lung zone with accompanying left pleural effusion.   When compared with yesterday's study, there is increasing right basilar infiltrate with additional infiltrate in the right mid lung zone. Small right pleural effusion is present.   Pulmonary vascular congestion appears a little worse. Cardiac size is indeterminate.       Persistent airspace consolidation/infiltrate left lower lung zone with stable left effusion.   Increasing infiltrate right mid lung zone and at right lung base with persistent small right pleural effusion.   Worsening pulmonary vascular congestion/CHF.   Signed by: Catherine Freeman 5/11/2024 1:27 PM Dictation workstation:   [id number]    FL modified barium swallow study    Result Date: 5/10/2024  Interpreted By:  Terence Ya and Lovelace Elizabeth STUDY: FL MODIFIED BARIUM SWALLOW STUDY;; 5/10/2024 12:00 pm   INDICATION: Signs/Symptoms:suspected aspiration.   COMPARISON: None.   ACCESSION NUMBER(S): [id number]   ORDERING CLINICIAN: REGAN PULIDO   TECHNIQUE: MBSS completed. Informed verbal consent obtained prior to completion of exam. Trials of thin, nectar thick, honey thick, and puree given. Fluoroscopy time :  Not included in epic at the time of this dictation..   SLP: YESSENIA Lamas/SLP Phone/Pager: Haiku   SPEECH FINDINGS: Reason for referral: Post prandial cough during CSE Patient hx: See CSE Respiratory status: WFL Previous diet: NPO   FINAL SPEECH RECOMMENDATIONS   Diet recommendations/feeding " strategies: PUREE DIET WITH HONEY/MODERATELY THICKENED LIQUIDS 1. UPRIGHT AT 90 DEGREES FOR ALL PO 2. MEDS CRUSHED IN PUREE 3. FEEDING ASSISTANCE 4. SLOW RATE AND SMALL BOLUS SIZE   Follow-up speech therapy recommended: Yes.   Education provided: Yes.   Treatment Provided today: No   Additional consult suggested: None   Repeat study/ dc plan: TBD   Mechanics of the swallow summary: *Oral phase: Reduced bolus formation; premature pharyngeal entry; oral residue with all boluses *Pharyngeal phase: Laryngeal penetration with thin and nectar liquids *Esophageal phase: DNT   SLP impressions with severity rating: Pt. presenting with oropharyngeal dysphagia characterized by reduced bolus formation and lingual weakness. Premature pharyngeal entry noted with liquid boluses. Laryngeal penetration noted intermittently with nectar liquids and inconsistently with thin liquids. No aspiration viewed, however risk for same exists.  No airway entry visualized with honey liquids or puree boluses. Did not assess solids 2/2 oral phase deficits. Adequate pharyngeal clearance achieved with all boluses tested. Modification of diet required to maximize swallowing efficiency and safety. Did not perform esophageal sweep due to positioning challenges.   Thin Liquids (MBSS) Rosenbek's Penetration Aspiration Scale, Thin Liquids (MBSS): 2. PENETRATION that CLEARS - contrast enter airway, above vocal cords, no residue   Nectar Thick Liquids (MBSS) Rosenbek's Penetration Aspiration Scale, Nectar thick liquids (MBSS): 3. PENETRATION with LOW ASPIRATION risk - contrast remains above vocal cords, visible residue   Honey Thick Liquids (MBSS) Rosenbek's Penetration Aspiration Scale, Honey thick liquids (MBSS): 1. NO ASPIRATION & NO PENETRATION - no aspiration, contrast does not enter airway   Purees (MBSS) Rosenbek's Penetration Aspiration Scale, Purees (MBSS): 1. NO ASPIRATION & NO PENETRATION - no aspiration, contrast does not enter airway   Solids  (MBSS) Dyanabek's Penetration Aspiration Scale, Solids (MBSS): DNT   Speech Therapy section of this report signed by YESSENIA Lamas/SLP on 5/10/2024 at 2:07 pm.   RADIOLOGY FINDINGS: As above.   Radiology section of this report signed by Terence Ya MD.       Penetration with thin liquid and nectar consistencies. No other penetration during this exam. No aspiration during this exam. Please see above for details.   MACRO: None   Signed by: Terence Ya 5/10/2024 2:31 PM Dictation workstation:   [id number]    XR chest 2 views    Result Date: 5/10/2024  Interpreted By:  Terence Ya, STUDY: XR CHEST 2 VIEWS;  5/10/2024 12:11 pm   INDICATION: Signs/Symptoms:R/o aspiration.   COMPARISON: CT scan abdomen and pelvis from 05/07/2024. Chest x-rays, most recent from 04/18/2024.   ACCESSION NUMBER(S): [id number]   ORDERING CLINICIAN: REGAN PULIDO   TECHNIQUE: PA and lateral views of the chest were obtained.   FINDINGS: MEDIASTINUM/LUNGS/STU: Cardiomegaly. Mild central vascular congestion. Mild interstitial thickening at the bases. Bilateral pleural effusions, left larger than right. Bibasilar atelectasis versus edema, left greater than right. Calcification in the aorta.   No pneumothorax. No tracheal deviation. No abnormal hilar fullness or gross mass on either side.   BONES: No lytic or blastic destructive bone lesion. Mild midthoracic spine dextroscoliosis.   UPPER ABDOMEN: Grossly intact.       Mild ongoing CHF as described.   MACRO: None   Signed by: Terence Ya 5/10/2024 1:02 PM Dictation workstation:   [id number]    NM hepatobiliary    Result Date: 5/8/2024  Interpreted By:  Garett Ricks,  and Aurora Willett STUDY: NM HEPATOBILIARY;  5/8/2024 3:48 pm   INDICATION: Signs/Symptoms:possible acute cholecystitis.   COMPARISON: Ultrasound gallbladder from 05/07/2024 CT abdomen from 05/07/2024   ACCESSION NUMBER(S): [id number]   ORDERING CLINICIAN: ZOILA LONDON   TECHNIQUE: DIVISION OF  NUCLEAR MEDICINE HEPATOBILIARY SCAN (HIDA)   The patient received an intravenous dose of  5.7 mCi of Tc-99m mebrofenin (Choletec).  Sequential images of the upper abdomen were then acquired over the next 120 minutes.   FINDINGS: There is prompt accumulation of activity within the liver and normal subsequent excretion via the biliary ductal system into the small bowel.  The gallbladder first visualizes at about  108 minutes after radiopharmaceutical injection and progressively fills.       Delayed filling of the gallbladder with no sign of cystic duct obstruction/acute cholecystitis.   I personally reviewed the images/study and I agree with the findings as stated by Brennon Benson MD (Radiology Resident). This study was interpreted at [place], [place].   MACRO: None   Signed by: Garett Ricks 5/8/2024 4:07 PM Dictation workstation:   [id number]    ECG 12 lead    Result Date: 5/8/2024  Normal sinus rhythm Anterolateral infarct (cited on or before 21-NOV-2014) Abnormal ECG When compared with ECG of 18-APR-2024 13:22, QRS axis Shifted right T wave amplitude has decreased in Anterior leads T wave inversion no longer evident in Lateral leads See ED provider note for full interpretation and clinical correlation Confirmed by Loretta Rodrigues ([id number]) on 5/8/2024 11:01:43 AM    US gallbladder    Result Date: 5/7/2024  Interpreted By:  Mushtaq Fitzgerald, STUDY: US GALLBLADDER   INDICATION: Signs/Symptoms:Abdominal pain.   ACCESSION NUMBER(S): [id number]   ORDERING CLINICIAN: BILLY BRISENO   TECHNIQUE: Real-time ultrasound right upper quadrant was performed.   FINDINGS: The study is limited due to inability of patient to be appropriately positioned and to cooperate.   The liver is normal in size and echogenicity without focal lesions. The gallbladder contains sludge and multiple mobile echogenic foci with posterior shadowing, consistent with gallstones. There  is no mild gallbladder wall thickening/edema and positive sonographic Grimes's sign, suggestive of possible acute cholecystitis. The intra and extrahepatic biliary ducts are not dilated. The common bile duct measures up to 5 mm in caliber. The pancreas is unremarkable. The right kidney is normal. There is trace amount of free fluid.       Cholelithiasis and possible acute cholecystitis without biliary duct dilatation. Correlate clinically and if indicated confirm with hepatobiliary scan.     Signed by: Mushtaq Fitzgerald 5/7/2024 4:16 PM Dictation workstation:   [id number]    CT abdomen pelvis wo IV contrast    Result Date: 5/7/2024  Interpreted By:  Terence Ya, STUDY: CT ABDOMEN PELVIS WO IV CONTRAST;  5/7/2024 2:42 pm   INDICATION: 89 y/o   F with  Signs/Symptoms:Abdominal pain and distention.   LIMITATIONS: Evaluation of the solid organs is limited due to non use of IV contrast.   ACCESSION NUMBER(S): [id number]   ORDERING CLINICIAN: BILLY BRISENO   TECHNIQUE: Thin-section noncontrast spiral axial images were obtained from the xiphoid down through the symphysis pubis. Sagittal and coronal reconstruction images were generated. Bone, mediastinal, lung, and liver windows were reviewed.   COMPARISON: None.   FINDINGS: LUNG BASES: Cardiomegaly. No pericardial effusion. There are small bilateral pleural effusions, slightly larger on the right than on the left. There is associated atelectasis posteriorly in both lower lobes.   LIVER: No hepatomegaly. Liver density was  within the limits of normal. No gross liver lesion in this unenhaced exam.   GALLBLADDER: No calcified stone in the gallbladder. No gallbladder dilatation. There is however mild nonspecific gallbladder wall thickening with subtle adjacent edema.   BILE DUCTS: No intrahepatic biliary ductal dilatation. Mild common bile duct dilatation up to 9 mm in diameter, tapering to normal distally.   SPLEEN: No splenomegaly. No gross splenic mass..   PANCREAS: No  pancreatic mass or inflammation, or ductal dilatation.   KIDNEYS/ADRENALS: No adrenal mass or enlargement. Punctate nonobstructing upper pole left renal stone on axial image 55. No other calcified stone on either side. No hydronephrosis, mass, or perinephric edema in either kidney. No ureteral stone or dilatation.   BLADDER/PELVIS: Urinary bladder was grossly intact. Previous hysterectomy.  No gross adnexal mass.   GREAT VESSELS/RETROPERITONEUM: Moderate to advanced aortoiliac calcifications. Otherwise, the abdominal aorta and IVC were intact. No suspicious retroperitoneal adenopathy. There is mesenteric adenopathy in the lower right abdomen/upper right pelvis medial to the right colon and cecum. Lymph nodes measure up to 16 mm in diameter and there is infiltration of the mesenteric fat in this area. No suspicious pelvic or inguinal adenopathy.   PERITONEUM: Mild ascites adjacent to the liver and spleen. Trace fluid in the right pericolic gutter. Very mild free pelvic fluid. No pneumoperitoneum. No peritoneal or mesenteric mass or inflammation.   BOWEL: The stomach was grossly intact. There was no small bowel dilatation or small bowel wall thickening. No small-bowel obstruction. Sigmoid diverticulosis. Mild  left colon diverticulosis. Mild right colon diverticulosis. Very mild stool or gas in the right colon and transverse colon with mild stool and gas in the left colon down through the sigmoid with moderate stool in the rectum. There was no colonic wall thickening or large bowel obstruction.  No edema adjacent to the colon. The cecal appendix could not be identified.   BONES: No destructive lytic or blastic bone lesion. Slight lumbar levoscoliosis. There is slight loss of height at T12, apparently remote. Moderate disc space narrowing with endplate spurring and vacuum disc phenomenon at L3-4. Trace anterolisthesis of L4 over L5 with mild endplate spurring and vacuum disc phenomenon at that level. Mild disc space  narrowing with endplate spurring at L2-3. Moderate multilevel distal thoracic spine DJD. Interfacet hypertrophy with spur formation throughout the mid and distal lumbar spine. Sclerotic arthritic changes in both SI joints.   ABDOMINAL WALL: Unremarkable.       Small nonspecific bilateral pleural effusions with associated posterior bibasilar lung atelectasis.   Punctate nonobstructing stone in the upper pole of the left kidney.   Scattered colonic diverticulosis without acute associated inflammation.   Nonspecific mesenteric adenopathy with associated fat stranding medial to the cecum and right colon. Question mesenteric adenitis.   Nonspecific gallbladder wall thickening with mild adjacent edema but without calcified stone. Cannot exclude noncalcified gallstones. Recommend further evaluation with gallbladder ultrasound to exclude acute cholecystitis.   Mild nonspecific abdominal and pelvic ascites as described.   Previous hysterectomy.   Arthritic changes in the spine as described.   MACRO: None   Signed by: Terence Ya 5/7/2024 2:57 PM Dictation workstation:   [id number]    Transthoracic Echo (TTE) Complete    Addendum Date: 4/20/2024    Bubble study was performed.  No clear PFO or shunt. [id number] Vincent Hanson MD Electronically Amended 4/20/2024, 11:21 AM   Final (Amended)      Result Date: 4/20/2024   [facility], [address] [place] [address]              Tel [phone] and Fax [phone] TRANSTHORACIC ECHOCARDIOGRAM REPORT  Patient Name:      ANTONY Jenkins Physician:    [id number] Raheel Gonzalez MD Study Date:        4/19/2024           Ordering Provider:    [id number] LAVINIA MANRIQUE MRN/PID:           [id number]            Fellow: Accession#:        [id number]        Nurse: Date of Birth/Age: 6/15/1928 / 89      Sonographer:          Shaq Guan  RDCS                    years Gender:            F                   Additional Staff: Height:            156.00 cm           Admit Date: Weight:            46.50 kg            Admission Status:     Observation -                                                              Priority discharge BSA / BMI:         1.43 m2 / 19.11     Encounter#:           [id number]                    kg/m2                                        Department Location:  [facility] Non                                                              Invasive Blood Pressure: 116 /48 mmHg Study Type:    TRANSTHORACIC ECHO (TTE) COMPLETE Diagnosis/ICD: Other transient cerebral ischemic attacks and related                syndromes-G45.8 Indication:    Transischemic Attack CPT Code:      Echo Complete w Full Doppler-[id number] Patient History: Pertinent History: TIA. Study Detail: The following Echo studies were performed: 2D, M-Mode, Doppler and               color flow. Image quality for this study is suboptimal.               Technically challenging study due to poor acoustic windows and               patient lying in supine position.  PHYSICIAN INTERPRETATION: Left Ventricle: The left ventricular systolic function is hyperdynamic, with an estimated ejection fraction of 70-75%. There are no regional wall motion abnormalities. The left ventricular cavity size is decreased. Left ventricular diastolic filling was indeterminate. Left Atrium: The left atrium is normal in size. Right Ventricle: The right ventricle is normal in size. There is normal right ventricular global systolic function. Right Atrium: The right atrium was not assessed. Aortic Valve: The aortic valve is trileaflet. The aortic valve appears degenerative. There is mild to moderate aortic valve cusp calcification. There is trivial aortic valve regurgitation. The peak instantaneous gradient of the aortic valve is 17.1 mmHg. Mitral Valve: The mitral valve is mildly thickened. There is mild  mitral annular calcification. There is trace mitral valve regurgitation. Tricuspid Valve: The tricuspid valve is structurally normal. No evidence of tricuspid regurgitation. Pulmonic Valve: The pulmonic valve is not well visualized. There is trace pulmonic valve regurgitation. Pericardium: There is no pericardial effusion noted. Aorta: The aortic root is normal. Systemic Veins: The inferior vena cava was not well visualized. In comparison to the previous echocardiogram(s): There are no prior studies on this patient for comparison purposes.  CONCLUSIONS:  1. Left ventricular systolic function is hyperdynamic with a 70-75% estimated ejection fraction.  2. Left ventricular cavity size is decreased.  3. Image quality for this study is suboptimal. QUANTITATIVE DATA SUMMARY: 2D MEASUREMENTS:                          Normal Ranges: LAs:           2.40 cm   (2.7-4.0cm) IVSd:          0.92 cm   (0.6-1.1cm) LVPWd:         0.82 cm   (0.6-1.1cm) LVIDd:         3.13 cm   (3.9-5.9cm) LVIDs:         1.43 cm LV Mass Index: 49.8 g/m2 LV % FS        54.3 % LA VOLUME:                              Normal Ranges: LA Vol A4C:        19.8 ml   (22+/-6mL/m2) LA Vol Index A4C:  13.9ml/m2 LA Area A4C:       9.9 cm2 LA Major Axis A4C: 4.2 cm AORTA MEASUREMENTS:                    Normal Ranges: Asc Ao, d: 2.10 cm (2.1-3.4cm) LV SYSTOLIC FUNCTION BY 2D PLANIMETRY (MOD):                     Normal Ranges: EF-A4C View: 63.8 % (>=55%) EF-A2C View: 73.1 % EF-Biplane:  68.5 % LV DIASTOLIC FUNCTION:                        Normal Ranges: MV Peak E:    1.16 m/s (0.7-1.2 m/s) MV Peak A:    1.72 m/s (0.42-0.7 m/s) E/A Ratio:    0.67     (1.0-2.2) MV lateral e' 0.05 m/s MV medial e'  0.05 m/s MITRAL VALVE:                       Normal Ranges: MV Vmax:    1.69 m/s  (<=1.3m/s) MV peak P.4 mmHg (<5mmHg) MV mean P.0 mmHg  (<2mmHg) MV DT:      156 msec  (150-240msec) AORTIC VALVE:                          Normal Ranges: AoV Vmax:      2.07 m/s   (<=1.7m/s) AoV Peak P.1 mmHg (<20mmHg) LVOT Max Ronni:  1.16 m/s  (<=1.1m/s) LVOT VTI:      24.60 cm LVOT Diameter: 1.90 cm   (1.8-2.4cm) AoV Area,Vmax: 1.59 cm2  (2.5-4.5cm2)  RIGHT VENTRICLE: TAPSE: 23.9 mm RV s'  0.15 m/s PULMONIC VALVE:                         Normal Ranges: PV Accel Time: 82 msec  (>120ms) PV Max Ronni:    1.4 m/s  (0.6-0.9m/s) PV Max P.2 mmHg  [id number] Raheel Gonzalez MD Electronically signed on 2024 at 1:01:16 PM  ** Final **     MR brain wo IV contrast    Result Date: 2024  Interpreted By:  Ketan Diaz  and Diana Bloom STUDY: MR BRAIN WO IV CONTRAST; MR ANGIO NECK WO IV CONTRAST; MR ANGIO HEAD WO IV CONTRAST;  2024 9:59 pm   INDICATION: Signs/Symptoms:stroke like symptoms.   COMPARISON: Brain MRI 2013. CT brain dated 2024.   ACCESSION NUMBER(S): [id number]; [id number]; [id number]   ORDERING CLINICIAN: LAVINIA MANRIQUE   TECHNIQUE: Axial T2, FLAIR, DWI, gradient echo T2 and sagittal and coronal T1 weighted images of brain were acquired. Noncontrast time-of-flight MR angiogram of the [id number] vessels was obtained with MIP reformats. Noncontrast time of flight MR angiogram of the neck vessels was obtained with MIP reformats.   FINDINGS: Brain MRI: There is no evidence of acute infarction. There are no extra-axial collections. There is no mass lesion and no midline shift. There is no hydrocephalus. There is generalized cerebral volume loss and associated ventricular and sulcal enlargement slightly progressed since . Scattered periventricular and deep white matter FLAIR hyperintensities suggestive of mild to moderate chronic microvascular ischemic change is grossly similar since 2013. No evidence of intracranial hemorrhage.   Paranasal Sinuses and Mastoids: Visualized paranasal sinuses are well aerated. Trace amount of fluid in the caudal right mastoid air cells. Left mastoid air cells are clear. The orbits are grossly normal.  Bilateral cataract surgeries.   There is a degenerative pannus in the retro odontoid region contributing to mild central canal stenosis at the level of C1 similar to prior.   MRA of the brain: Anterior circulation: The intracranial portions of the internal carotid, middle cerebral, and anterior cerebral arteries are patent, without significant focal stenosis. There is a normal anterior communicating artery.   Posterior circulation: The intracranial portions of the right vertebral, basilar, and proximal posterior cerebral arteries are patent without focal stenosis. Slightly limited evaluation of the bilateral posterior cerebral arteries. There is lack of flow enhancement of the left vertebral artery with corresponding absent flow void within the left vertebral artery and there is only focal region of enhancement of the left vertebral artery near the vertebrobasilar junction, for example axial image 5 of 125. There is a question of intramural hematoma versus surrounding venous plexus artifact around the left upper cervical vertebral artery, axial image 41 through 37 of 41.,     MRA of the neck: Common carotid arteries: Limited evaluation. Left internal carotid: No significant stenosis. Right internal carotid: No significant stenosis. Cervical vertebral arteries: The right vertebral artery is widely patent without focal stenosis. 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       There is no evidence of acute hemorrhage, mass lesion or acute infarction. Slight progression of generalized cerebral volume loss.   There is age indeterminate occlusion of the left vertebral artery new since 2013 there is a question of surrounding hematoma around the upper cervical left vertebral artery, for example axial T2 image 41 of 41. 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 Further evaluation with CT angiogram of the neck and MRA neck dissection protocol is recommended.   Signed  by: Ketan Diaz 4/20/2024 8:41 AM Dictation workstation:   [id number]    MR angio head wo IV contrast    Result Date: 4/20/2024  Interpreted By:  Ketan Diaz and Shapiro Boris STUDY: MR BRAIN WO IV CONTRAST; MR ANGIO NECK WO IV CONTRAST; MR ANGIO HEAD WO IV CONTRAST;  4/19/2024 9:59 pm   INDICATION: Signs/Symptoms:stroke like symptoms.   COMPARISON: Brain MRI 09/09/2013. CT brain dated 04/18/2024.   ACCESSION NUMBER(S): [id number]; [id number]; [id number]   ORDERING CLINICIAN: LAVINIA MANRIQUE   TECHNIQUE: Axial T2, FLAIR, DWI, gradient echo T2 and sagittal and coronal T1 weighted images of brain were acquired. Noncontrast time-of-flight MR angiogram of the ajyfdc-ep-Hlgvhl vessels was obtained with MIP reformats. Noncontrast time of flight MR angiogram of the neck vessels was obtained with MIP reformats.   FINDINGS: Brain MRI: There is no evidence of acute infarction. There are no extra-axial collections. There is no mass lesion and no midline shift. There is no hydrocephalus. There is generalized cerebral volume loss and associated ventricular and sulcal enlargement slightly progressed since 2013. Scattered periventricular and deep white matter FLAIR hyperintensities suggestive of mild to moderate chronic microvascular ischemic change is grossly similar since 2013. No evidence of intracranial hemorrhage.   Paranasal Sinuses and Mastoids: Visualized paranasal sinuses are well aerated. Trace amount of fluid in the caudal right mastoid air cells. Left mastoid air cells are clear. The orbits are grossly normal. Bilateral cataract surgeries.   There is a degenerative pannus in the retro odontoid region contributing to mild central canal stenosis at the level of C1 similar to prior.   MRA of the brain: Anterior circulation: The intracranial portions of the internal carotid, middle cerebral, and anterior cerebral arteries are patent, without significant focal stenosis. There is a normal anterior  communicating artery.   Posterior circulation: The intracranial portions of the right vertebral, basilar, and proximal posterior cerebral arteries are patent without focal stenosis. Slightly limited evaluation of the bilateral posterior cerebral arteries. There is lack of flow enhancement of the left vertebral artery with corresponding absent flow void within the left vertebral artery and there is only focal region of enhancement of the left vertebral artery near the vertebrobasilar junction, for example axial image 5 of 125. There is a question of intramural hematoma versus surrounding venous plexus artifact around the left upper cervical vertebral artery, axial image 41 through 37 of 41.,     MRA of the neck: Common carotid arteries: Limited evaluation. Left internal carotid: No significant stenosis. Right internal carotid: No significant stenosis. Cervical vertebral arteries: The right vertebral artery is widely patent without focal stenosis. 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       There is no evidence of acute hemorrhage, mass lesion or acute infarction. Slight progression of generalized cerebral volume loss.   There is age indeterminate occlusion of the left vertebral artery new since 2013 there is a question of surrounding hematoma around the upper cervical left vertebral artery, for example axial T2 image 41 of 41. 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 Further evaluation with CT angiogram of the neck and MRA neck dissection protocol is recommended.   Signed by: Ketan Diaz 4/20/2024 8:41 AM Dictation workstation:   [id number]    MR angio neck wo IV contrast    Result Date: 4/20/2024  Interpreted By:  Ketan Diaz and Shapiro Boris STUDY: MR BRAIN WO IV CONTRAST; MR ANGIO NECK WO IV CONTRAST; MR ANGIO HEAD WO IV CONTRAST;  4/19/2024 9:59 pm   INDICATION: Signs/Symptoms:stroke like symptoms.   COMPARISON: Brain MRI 09/09/2013.  CT brain dated 04/18/2024.   ACCESSION NUMBER(S): [id number]; [id number]; [id number]   ORDERING CLINICIAN: LAVINIA MANRIQUE   TECHNIQUE: Axial T2, FLAIR, DWI, gradient echo T2 and sagittal and coronal T1 weighted images of brain were acquired. Noncontrast time-of-flight MR angiogram of the ackkac-eu-Bkymxp vessels was obtained with MIP reformats. Noncontrast time of flight MR angiogram of the neck vessels was obtained with MIP reformats.   FINDINGS: Brain MRI: There is no evidence of acute infarction. There are no extra-axial collections. There is no mass lesion and no midline shift. There is no hydrocephalus. There is generalized cerebral volume loss and associated ventricular and sulcal enlargement slightly progressed since 2013. Scattered periventricular and deep white matter FLAIR hyperintensities suggestive of mild to moderate chronic microvascular ischemic change is grossly similar since 2013. No evidence of intracranial hemorrhage.   Paranasal Sinuses and Mastoids: Visualized paranasal sinuses are well aerated. Trace amount of fluid in the caudal right mastoid air cells. Left mastoid air cells are clear. The orbits are grossly normal. Bilateral cataract surgeries.   There is a degenerative pannus in the retro odontoid region contributing to mild central canal stenosis at the level of C1 similar to prior.   MRA of the brain: Anterior circulation: The intracranial portions of the internal carotid, middle cerebral, and anterior cerebral arteries are patent, without significant focal stenosis. There is a normal anterior communicating artery.   Posterior circulation: The intracranial portions of the right vertebral, basilar, and proximal posterior cerebral arteries are patent without focal stenosis. Slightly limited evaluation of the bilateral posterior cerebral arteries. There is lack of flow enhancement of the left vertebral artery with corresponding absent flow void within the left vertebral artery  and there is only focal region of enhancement of the left vertebral artery near the vertebrobasilar junction, for example axial image 5 of 125. There is a question of intramural hematoma versus surrounding venous plexus artifact around the left upper cervical vertebral artery, axial image 41 through 37 of 41.,     MRA of the neck: Common carotid arteries: Limited evaluation. Left internal carotid: No significant stenosis. Right internal carotid: No significant stenosis. Cervical vertebral arteries: The right vertebral artery is widely patent without focal stenosis. 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       There is no evidence of acute hemorrhage, mass lesion or acute infarction. Slight progression of generalized cerebral volume loss.   There is age indeterminate occlusion of the left vertebral artery new since 2013 there is a question of surrounding hematoma around the upper cervical left vertebral artery, for example axial T2 image 41 of 41. 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 Further evaluation with CT angiogram of the neck and MRA neck dissection protocol is recommended.   Signed by: Ketan Diaz 4/20/2024 8:41 AM Dictation workstation:   [id number]    ECG 12 lead    Result Date: 4/19/2024  Normal sinus rhythm Left axis deviation Minimal voltage criteria for LVH, may be normal variant ( Mars product ) Anterolateral infarct (cited on or before 21-NOV-2014) Abnormal ECG When compared with ECG of 21-NOV-2014 10:27, Significant changes have occurred See ED provider note for full interpretation and clinical correlation Confirmed by Brissa Naylor ([id number]) on 4/19/2024 9:58:58 AM    XR chest 1 view    Result Date: 4/18/2024  Interpreted By:  Mushtaq Fitzgerald, STUDY: XR CHEST 1 VIEW; 4/18/2024 1:52 pm   INDICATION: Signs/Symptoms:right sided weakness   COMPARISON: September 2013   ACCESSION NUMBER(S): [id number]   ORDERING CLINICIAN: HILLARY  TR   FINDINGS: The study is limited due to rotation and poor inspiratory effort, with resultant crowding of the pulmonary vasculature. The cardiomediastinal silhouette is within normal limits for the technique. Prominence of the interstitial markings is noted bilaterally. There is no pneumothorax, confluent infiltrates or significant effusion. The osseous structures are changed.       Allowing for the aforementioned limitation, interstitial lung disease without acute infiltrate.   Signed by: Mushtaq Fitzgerald 4/18/2024 2:25 PM Dictation workstation:   [id number]    CT brain attack head wo IV contrast    Result Date: 4/18/2024  Interpreted By:  Manolo Lara, STUDY: CT BRAIN ATTACK HEAD WO IV CONTRAST;  4/18/2024 1:12 pm   INDICATION: Signs/Symptoms:Stroke Evaluation.   COMPARISON: September 9, 2013 head CT, September 9, 2013 MRI brain   ACCESSION NUMBER(S): [id number]   ORDERING CLINICIAN: HILLARY ARMANDO   TECHNIQUE: Noncontrast axial CT scan of head was performed. Angled reformats in brain and bone windows were generated. The images were reviewed in bone, brain, blood and soft tissue windows.   FINDINGS: CSF Spaces: The ventricles, sulci and basal cisterns are within normal limits. There is no extraaxial fluid collection.   Parenchyma:  The grey-white differentiation is intact. There is no mass effect or midline shift.  There is no intracranial hemorrhage.   Calvarium: The calvarium is unremarkable.   Paranasal sinuses and mastoids: Minimal partial opacification of the right mastoid air cells. The paranasal sinuses are clear.       No evidence of acute cortical infarct or intracranial hemorrhage.   Atrophy and nonspecific low-density white matter changes.   Minimal partial opacification right mastoid air cells.   MACRO: Manolo Lara discussed the significance and urgency of this critical finding by secure chat with  HILLARY ARMANDO on 4/18/2024 at 1:38 pm.  (**-RCF-**) Findings:  See findings.     Signed  by: Manolo Lara 4/18/2024 1:38 PM Dictation workstation:   [id number]    Results for orders placed or performed during the hospital encounter of 05/07/24 (from the past 24 hour(s))   CBC   Result Value Ref Range    WBC 7.2 4.4 - 11.3 x10*3/uL    nRBC 0.0 0.0 - 0.0 /100 WBCs    RBC 3.38 (L) 4.00 - 5.20 x10*6/uL    Hemoglobin 11.2 (L) 12.0 - 16.0 g/dL    Hematocrit 34.1 (L) 36.0 - 46.0 %     (H) 80 - 100 fL    MCH 33.1 26.0 - 34.0 pg    MCHC 32.8 32.0 - 36.0 g/dL    RDW 14.4 11.5 - 14.5 %    Platelets 187 150 - 450 x10*3/uL   Magnesium   Result Value Ref Range    Magnesium 1.90 1.60 - 2.40 mg/dL   Comprehensive Metabolic Panel   Result Value Ref Range    Glucose 81 74 - 99 mg/dL    Sodium 143 136 - 145 mmol/L    Potassium 3.1 (L) 3.5 - 5.3 mmol/L    Chloride 106 98 - 107 mmol/L    Bicarbonate 29 21 - 32 mmol/L    Anion Gap 11 10 - 20 mmol/L    Urea Nitrogen 13 6 - 23 mg/dL    Creatinine 1.04 0.50 - 1.05 mg/dL    eGFR 50 (L) >60 mL/min/1.73m*2    Calcium 7.6 (L) 8.6 - 10.3 mg/dL    Albumin 2.6 (L) 3.4 - 5.0 g/dL    Alkaline Phosphatase 68 33 - 136 U/L    Total Protein 4.5 (L) 6.4 - 8.2 g/dL    AST 48 (H) 9 - 39 U/L    Bilirubin, Total 1.1 0.0 - 1.2 mg/dL    ALT 27 7 - 45 U/L    Scheduled medications  amLODIPine, 10 mg, oral, q AM  aspirin, 81 mg, oral, Daily  atorvastatin, 40 mg, oral, Nightly  busPIRone, 10 mg, oral, TID  docusate sodium, 100 mg, oral, BID  [Held by provider] enoxaparin, 40 mg, subcutaneous, q24h  [Held by provider] furosemide, 40 mg, oral, Daily  iohexol, 85 mL, intravenous, Once in imaging  lisinopril, 20 mg, oral, Daily  lubricating eye drops, 2 drop, Both Eyes, BID  mirtazapine, 15 mg, oral, q PM  pantoprazole, 40 mg, oral, Daily before breakfast   Or  pantoprazole, 40 mg, intravenous, Daily before breakfast  piperacillin-tazobactam, 2.25 g, intravenous, q6h  potassium chloride, 20 mEq, intravenous, q2h  risperiDONE, 0.25 mg, oral, BID  Tc-99m-albumin, 5.7 millicurie, intravenous,  Once in imaging      Continuous medications  [Held by provider] D5 % and 0.9 % sodium chloride, 75 mL/hr, Last Rate: Stopped (05/11/24 1200)      PRN medications  PRN medications: acetaminophen, hydrALAZINE, ipratropium-albuteroL, melatonin, morphine, ondansetron, polyethylene glycol, risperiDONE       Assessment/Plan      89 year old woman with acute cholecystitis(sx improving) along with CHF exacerbation.  Very poor PO and new dysphagia.  Palliative care consulted for goals of care .    Goals of care:  already DNR/DNI  -continue to honor code status    Anorexia related to cholecystitis  -will decrease mirtazapine to 7.5 mg to help increase appetite    Alzheimer's dementia with decline in status in the setting of advanced age:  reviewed poor PO intake, ability to rehab with both daughter and son.  Advised consideration of hospice care upon discharge as it appears patient's condition is declining and it is reasonable to consider what type of care they would like at the end of her life  -they will discuss and either call me, I also plan on following up tomorrow     .           I spent 25 minutes in the professional and overall care of this patient related to ACP in addition to other time spent in assessment, chart review, and coordination of care         Gillian Lomeli, CASH-CNP

## 2024-05-13 NOTE — PROGRESS NOTES
Juanis Cornejo is a 89 y.o. female     Pain is little better today  She is more awake,   However still has pain expression on her face  She is unable to provide much hx  No chest pain  No shortness of breath    Review of Systems           Vitals:    05/13/24 1200   BP: (!) 114/40   Pulse: 62   Resp: 18   Temp: 36.1 °C (97 °F)   SpO2: 96%        Scheduled medications  amLODIPine, 10 mg, oral, q AM  aspirin, 81 mg, oral, Daily  atorvastatin, 40 mg, oral, Nightly  busPIRone, 10 mg, oral, TID  docusate sodium, 100 mg, oral, BID  [Held by provider] enoxaparin, 40 mg, subcutaneous, q24h  [Held by provider] furosemide, 40 mg, oral, Daily  iohexol, 85 mL, intravenous, Once in imaging  lisinopril, 20 mg, oral, Daily  lubricating eye drops, 2 drop, Both Eyes, BID  mirtazapine, 15 mg, oral, q PM  pantoprazole, 40 mg, oral, Daily before breakfast   Or  pantoprazole, 40 mg, intravenous, Daily before breakfast  piperacillin-tazobactam, 2.25 g, intravenous, q6h  risperiDONE, 0.25 mg, oral, BID  Tc-99m-albumin, 5.7 millicurie, intravenous, Once in imaging      Continuous medications  [Held by provider] D5 % and 0.9 % sodium chloride, 75 mL/hr, Last Rate: Stopped (05/11/24 1200)      PRN medications  PRN medications: acetaminophen, hydrALAZINE, ipratropium-albuteroL, melatonin, morphine, ondansetron, polyethylene glycol, risperiDONE    Lab Review   Results from last 7 days   Lab Units 05/13/24  0544 05/12/24  0621 05/11/24  0640   WBC AUTO x10*3/uL 7.2 6.6 5.4   HEMOGLOBIN g/dL 11.2* 11.0* 9.9*   HEMATOCRIT % 34.1* 35.5* 30.9*   PLATELETS AUTO x10*3/uL 187 169 148*     Results from last 7 days   Lab Units 05/13/24  0544 05/12/24  0621 05/11/24  0640   SODIUM mmol/L 143 140 140   POTASSIUM mmol/L 3.1* 3.2* 3.5   CHLORIDE mmol/L 106 108* 113*   CO2 mmol/L 29 24 21   BUN mg/dL 13 11 11   CREATININE mg/dL 1.04 0.98 0.98   CALCIUM mg/dL 7.6* 7.6* 7.3*   PROTEIN TOTAL g/dL 4.5* 5.3* 4.6*   BILIRUBIN TOTAL mg/dL 1.1 1.2 0.9   ALK PHOS U/L 68  67 64   ALT U/L 27 29 25   AST U/L 48* 48* 39   GLUCOSE mg/dL 81 86 119*            XR chest 1 view   Final Result   Persistent airspace consolidation/infiltrate left lower lung zone   with stable left effusion.        Increasing infiltrate right mid lung zone and at right lung base with   persistent small right pleural effusion.        Worsening pulmonary vascular congestion/CHF.        Signed by: Catherine Freeman 5/11/2024 1:27 PM   Dictation workstation:   [id number]      XR chest 2 views   Final Result   Mild ongoing CHF as described.        MACRO:   None        Signed by: Terence Ya 5/10/2024 1:02 PM   Dictation workstation:   [id number]      FL modified barium swallow study   Final Result   Penetration with thin liquid and nectar consistencies. No other   penetration during this exam. No aspiration during this exam. Please   see above for details.        MACRO:   None        Signed by: Terence Ya 5/10/2024 2:31 PM   Dictation workstation:   [id number]      NM hepatobiliary   Final Result   Delayed filling of the gallbladder with no sign of cystic duct   obstruction/acute cholecystitis.        I personally reviewed the images/study and I agree with the findings   as stated by Brennon Benson MD (Radiology Resident).   This study was interpreted at [place], [place].        MACRO:   None        Signed by: Garett Ricks 5/8/2024 4:07 PM   Dictation workstation:   [id number]      US gallbladder   Final Result   Cholelithiasis and possible acute cholecystitis without biliary duct   dilatation. Correlate clinically and if indicated confirm with   hepatobiliary scan.             Signed by: Mushtaq Fitzgerald 5/7/2024 4:16 PM   Dictation workstation:   [id number]      CT abdomen pelvis wo IV contrast   Final Result   Small nonspecific bilateral pleural effusions with associated   posterior bibasilar lung atelectasis.        Punctate nonobstructing  stone in the upper pole of the left kidney.        Scattered colonic diverticulosis without acute associated   inflammation.        Nonspecific mesenteric adenopathy with associated fat stranding   medial to the cecum and right colon. Question mesenteric adenitis.        Nonspecific gallbladder wall thickening with mild adjacent edema but   without calcified stone. Cannot exclude noncalcified gallstones.   Recommend further evaluation with gallbladder ultrasound to exclude   acute cholecystitis.        Mild nonspecific abdominal and pelvic ascites as described.        Previous hysterectomy.        Arthritic changes in the spine as described.        MACRO:   None        Signed by: Terence Ya 5/7/2024 2:57 PM   Dictation workstation:   [id number]            Physical Exam     Constitutional   General appearance: Awake    Pulmonary   Respiratory assessment: Coarse rhonchi bilaterally  Cardiovascular   Auscultation of heart: Apical pulse normal, heart rate and rhythm normal, normal S1 and S2, no murmurs and no pericardial rub.    Exam for edema: 1+ edema  Abdomen   Abdominal Exam: Distended abdomen  Liver and Spleen exam: No hepato-splenomegaly.   Musculoskeletal     Skin inspection: Normal skin color and pigmentation, normal skin turgor and no visible rash.   Neurologic   Cranial nerves: Nerves 2-12 were intact, no focal neuro defects.     Assessment/Plan      #Acute respiratory failure  #Acute CHF  DuoNebs as needed    #Cholecystitis acute versus chronic  Continue antibiotics    Son has requested DNR    #Dementia  At her baseline    #Hypertension  Stable    Ot and pt   Will ask pal care to see for goals of care

## 2024-05-14 ENCOUNTER — APPOINTMENT (OUTPATIENT)
Dept: RADIOLOGY | Facility: HOSPITAL | Age: 89
DRG: 444 | End: 2024-05-14
Payer: MEDICARE

## 2024-05-14 LAB
ALBUMIN SERPL BCP-MCNC: 3 G/DL (ref 3.4–5)
ALP SERPL-CCNC: 71 U/L (ref 33–136)
ALT SERPL W P-5'-P-CCNC: 26 U/L (ref 7–45)
ANION GAP SERPL CALC-SCNC: 12 MMOL/L (ref 10–20)
AST SERPL W P-5'-P-CCNC: 43 U/L (ref 9–39)
BILIRUB SERPL-MCNC: 1.1 MG/DL (ref 0–1.2)
BUN SERPL-MCNC: 13 MG/DL (ref 6–23)
CALCIUM SERPL-MCNC: 7.7 MG/DL (ref 8.6–10.3)
CHLORIDE SERPL-SCNC: 105 MMOL/L (ref 98–107)
CO2 SERPL-SCNC: 29 MMOL/L (ref 21–32)
CREAT SERPL-MCNC: 1 MG/DL (ref 0.5–1.05)
EGFRCR SERPLBLD CKD-EPI 2021: 52 ML/MIN/1.73M*2
ERYTHROCYTE [DISTWIDTH] IN BLOOD BY AUTOMATED COUNT: 14.5 % (ref 11.5–14.5)
GLUCOSE SERPL-MCNC: 115 MG/DL (ref 74–99)
HCT VFR BLD AUTO: 36.6 % (ref 36–46)
HGB BLD-MCNC: 12 G/DL (ref 12–16)
MAGNESIUM SERPL-MCNC: 2.1 MG/DL (ref 1.6–2.4)
MCH RBC QN AUTO: 33.1 PG (ref 26–34)
MCHC RBC AUTO-ENTMCNC: 32.8 G/DL (ref 32–36)
MCV RBC AUTO: 101 FL (ref 80–100)
NRBC BLD-RTO: 0 /100 WBCS (ref 0–0)
PLATELET # BLD AUTO: 228 X10*3/UL (ref 150–450)
POTASSIUM SERPL-SCNC: 3 MMOL/L (ref 3.5–5.3)
PROT SERPL-MCNC: 5.4 G/DL (ref 6.4–8.2)
RBC # BLD AUTO: 3.62 X10*6/UL (ref 4–5.2)
SODIUM SERPL-SCNC: 143 MMOL/L (ref 136–145)
WBC # BLD AUTO: 7 X10*3/UL (ref 4.4–11.3)

## 2024-05-14 PROCEDURE — 83735 ASSAY OF MAGNESIUM: CPT | Performed by: NURSE PRACTITIONER

## 2024-05-14 PROCEDURE — 2500000004 HC RX 250 GENERAL PHARMACY W/ HCPCS (ALT 636 FOR OP/ED): Performed by: FAMILY MEDICINE

## 2024-05-14 PROCEDURE — 80053 COMPREHEN METABOLIC PANEL: CPT | Performed by: NURSE PRACTITIONER

## 2024-05-14 PROCEDURE — 36415 COLL VENOUS BLD VENIPUNCTURE: CPT | Performed by: NURSE PRACTITIONER

## 2024-05-14 PROCEDURE — 2500000005 HC RX 250 GENERAL PHARMACY W/O HCPCS: Performed by: NURSE PRACTITIONER

## 2024-05-14 PROCEDURE — 71045 X-RAY EXAM CHEST 1 VIEW: CPT

## 2024-05-14 PROCEDURE — 2500000004 HC RX 250 GENERAL PHARMACY W/ HCPCS (ALT 636 FOR OP/ED): Performed by: NURSE PRACTITIONER

## 2024-05-14 PROCEDURE — 71045 X-RAY EXAM CHEST 1 VIEW: CPT | Performed by: RADIOLOGY

## 2024-05-14 PROCEDURE — 99233 SBSQ HOSP IP/OBS HIGH 50: CPT | Performed by: NURSE PRACTITIONER

## 2024-05-14 PROCEDURE — C9113 INJ PANTOPRAZOLE SODIUM, VIA: HCPCS | Performed by: NURSE PRACTITIONER

## 2024-05-14 PROCEDURE — 92526 ORAL FUNCTION THERAPY: CPT | Mod: GN

## 2024-05-14 PROCEDURE — 85027 COMPLETE CBC AUTOMATED: CPT | Performed by: NURSE PRACTITIONER

## 2024-05-14 PROCEDURE — 1100000001 HC PRIVATE ROOM DAILY

## 2024-05-14 PROCEDURE — 2500000001 HC RX 250 WO HCPCS SELF ADMINISTERED DRUGS (ALT 637 FOR MEDICARE OP): Performed by: NURSE PRACTITIONER

## 2024-05-14 RX ORDER — FUROSEMIDE 10 MG/ML
20 INJECTION INTRAMUSCULAR; INTRAVENOUS ONCE
Status: DISCONTINUED | OUTPATIENT
Start: 2024-05-14 | End: 2024-05-15

## 2024-05-14 RX ORDER — POTASSIUM CHLORIDE 14.9 MG/ML
20 INJECTION INTRAVENOUS
Status: COMPLETED | OUTPATIENT
Start: 2024-05-14 | End: 2024-05-14

## 2024-05-14 RX ADMIN — CARBOXYMETHYLCELLULOSE SODIUM 2 DROP: 0.5 SOLUTION/ DROPS OPHTHALMIC at 21:32

## 2024-05-14 RX ADMIN — DOCUSATE SODIUM 100 MG: 100 CAPSULE, LIQUID FILLED ORAL at 21:32

## 2024-05-14 RX ADMIN — PIPERACILLIN SODIUM AND TAZOBACTAM SODIUM 2.25 G: 2; .25 INJECTION, SOLUTION INTRAVENOUS at 17:02

## 2024-05-14 RX ADMIN — MIRTAZAPINE 7.5 MG: 15 TABLET, FILM COATED ORAL at 21:32

## 2024-05-14 RX ADMIN — PIPERACILLIN SODIUM AND TAZOBACTAM SODIUM 2.25 G: 2; .25 INJECTION, SOLUTION INTRAVENOUS at 22:07

## 2024-05-14 RX ADMIN — RISPERIDONE 0.25 MG: 0.25 TABLET, FILM COATED ORAL at 10:09

## 2024-05-14 RX ADMIN — LISINOPRIL 20 MG: 20 TABLET ORAL at 10:09

## 2024-05-14 RX ADMIN — BUSPIRONE HYDROCHLORIDE 10 MG: 10 TABLET ORAL at 21:32

## 2024-05-14 RX ADMIN — BUSPIRONE HYDROCHLORIDE 10 MG: 10 TABLET ORAL at 14:34

## 2024-05-14 RX ADMIN — RISPERIDONE 0.25 MG: 0.25 TABLET, FILM COATED ORAL at 21:32

## 2024-05-14 RX ADMIN — BUSPIRONE HYDROCHLORIDE 10 MG: 10 TABLET ORAL at 10:10

## 2024-05-14 RX ADMIN — CARBOXYMETHYLCELLULOSE SODIUM 2 DROP: 0.5 SOLUTION/ DROPS OPHTHALMIC at 10:08

## 2024-05-14 RX ADMIN — ASPIRIN 81 MG: 81 TABLET, COATED ORAL at 10:10

## 2024-05-14 RX ADMIN — PIPERACILLIN SODIUM AND TAZOBACTAM SODIUM 2.25 G: 2; .25 INJECTION, SOLUTION INTRAVENOUS at 10:06

## 2024-05-14 RX ADMIN — POTASSIUM CHLORIDE 20 MEQ: 14.9 INJECTION, SOLUTION INTRAVENOUS at 10:29

## 2024-05-14 RX ADMIN — ATORVASTATIN CALCIUM 40 MG: 40 TABLET, FILM COATED ORAL at 21:32

## 2024-05-14 RX ADMIN — PANTOPRAZOLE SODIUM 40 MG: 40 INJECTION, POWDER, FOR SOLUTION INTRAVENOUS at 06:00

## 2024-05-14 RX ADMIN — PIPERACILLIN SODIUM AND TAZOBACTAM SODIUM 2.25 G: 2; .25 INJECTION, SOLUTION INTRAVENOUS at 03:31

## 2024-05-14 RX ADMIN — POTASSIUM CHLORIDE 20 MEQ: 14.9 INJECTION, SOLUTION INTRAVENOUS at 14:13

## 2024-05-14 RX ADMIN — DOCUSATE SODIUM 100 MG: 100 CAPSULE, LIQUID FILLED ORAL at 10:09

## 2024-05-14 RX ADMIN — AMLODIPINE BESYLATE 10 MG: 10 TABLET ORAL at 10:10

## 2024-05-14 ASSESSMENT — COGNITIVE AND FUNCTIONAL STATUS - GENERAL
WALKING IN HOSPITAL ROOM: TOTAL
PERSONAL GROOMING: A LOT
EATING MEALS: A LOT
DRESSING REGULAR UPPER BODY CLOTHING: A LOT
PERSONAL GROOMING: A LOT
MOVING FROM LYING ON BACK TO SITTING ON SIDE OF FLAT BED WITH BEDRAILS: A LOT
TURNING FROM BACK TO SIDE WHILE IN FLAT BAD: A LOT
WALKING IN HOSPITAL ROOM: TOTAL
EATING MEALS: A LOT
STANDING UP FROM CHAIR USING ARMS: A LOT
MOBILITY SCORE: 10
WALKING IN HOSPITAL ROOM: TOTAL
DRESSING REGULAR LOWER BODY CLOTHING: A LOT
MOBILITY SCORE: 10
MOVING TO AND FROM BED TO CHAIR: A LOT
CLIMB 3 TO 5 STEPS WITH RAILING: TOTAL
STANDING UP FROM CHAIR USING ARMS: A LOT
WALKING IN HOSPITAL ROOM: TOTAL
MOVING TO AND FROM BED TO CHAIR: A LOT
MOVING TO AND FROM BED TO CHAIR: A LOT
TOILETING: A LOT
PERSONAL GROOMING: A LOT
DRESSING REGULAR UPPER BODY CLOTHING: A LOT
STANDING UP FROM CHAIR USING ARMS: A LOT
CLIMB 3 TO 5 STEPS WITH RAILING: TOTAL
TURNING FROM BACK TO SIDE WHILE IN FLAT BAD: A LOT
HELP NEEDED FOR BATHING: A LOT
MOBILITY SCORE: 10
PERSONAL GROOMING: A LOT
WALKING IN HOSPITAL ROOM: TOTAL
DRESSING REGULAR UPPER BODY CLOTHING: A LOT
TURNING FROM BACK TO SIDE WHILE IN FLAT BAD: A LOT
TOILETING: A LOT
HELP NEEDED FOR BATHING: A LOT
MOVING FROM LYING ON BACK TO SITTING ON SIDE OF FLAT BED WITH BEDRAILS: A LOT
HELP NEEDED FOR BATHING: A LOT
DAILY ACTIVITIY SCORE: 12
DAILY ACTIVITIY SCORE: 12
DRESSING REGULAR LOWER BODY CLOTHING: A LOT
HELP NEEDED FOR BATHING: A LOT
EATING MEALS: A LOT
DRESSING REGULAR UPPER BODY CLOTHING: A LOT
DAILY ACTIVITIY SCORE: 12
MOVING TO AND FROM BED TO CHAIR: A LOT
TOILETING: A LOT
MOBILITY SCORE: 10
CLIMB 3 TO 5 STEPS WITH RAILING: TOTAL
STANDING UP FROM CHAIR USING ARMS: A LOT
MOBILITY SCORE: 10
DRESSING REGULAR LOWER BODY CLOTHING: A LOT
TOILETING: A LOT
CLIMB 3 TO 5 STEPS WITH RAILING: TOTAL
MOVING FROM LYING ON BACK TO SITTING ON SIDE OF FLAT BED WITH BEDRAILS: A LOT
DAILY ACTIVITIY SCORE: 12
DAILY ACTIVITIY SCORE: 12
HELP NEEDED FOR BATHING: A LOT
EATING MEALS: A LOT
MOVING FROM LYING ON BACK TO SITTING ON SIDE OF FLAT BED WITH BEDRAILS: A LOT
DRESSING REGULAR LOWER BODY CLOTHING: A LOT
MOVING FROM LYING ON BACK TO SITTING ON SIDE OF FLAT BED WITH BEDRAILS: A LOT
CLIMB 3 TO 5 STEPS WITH RAILING: TOTAL
MOVING TO AND FROM BED TO CHAIR: A LOT
EATING MEALS: A LOT
PERSONAL GROOMING: A LOT
STANDING UP FROM CHAIR USING ARMS: A LOT
DRESSING REGULAR LOWER BODY CLOTHING: A LOT
TOILETING: A LOT
DRESSING REGULAR UPPER BODY CLOTHING: A LOT
TURNING FROM BACK TO SIDE WHILE IN FLAT BAD: A LOT
TURNING FROM BACK TO SIDE WHILE IN FLAT BAD: A LOT

## 2024-05-14 ASSESSMENT — PAIN SCALES - GENERAL: PAINLEVEL_OUTOF10: 0 - NO PAIN

## 2024-05-14 NOTE — PROGRESS NOTES
"Juanis Cornejo is a 89 y.o. female on day 6 of admission presenting with RUQ abdominal pain. She presented to our ED 5/7/24 with c/o ABD pain, found to have cholecystitis and has been treated conservatively. Has also developed a CHF exacerbation and is currently on furosemide IV. She is already DNR/DNI, lives in [facility]. Per discussion yesterday with primary proxy (daughter Gillian), she is ceding day to day management decisions to brother Keith but they are in conversation about big decisions and on the same page so far with care.      ROS limited due to dementia, but patient shakes head \"no\" when asked about pain.  Sweeney-Cummings scores have been 0 in last 24 hours and she has not received pain meds.  Staff have not noted new issues, poor PO intake    Subjective   Symptoms (0 - 10, Best to Worst)  Dickson Symptom Assessment System  Pain Score: 0 - No pain         Objective     Physical Exam  Constitutional:       Appearance: She is ill-appearing.   HENT:      Head: Normocephalic and atraumatic.      Nose: Nose normal.      Mouth/Throat:      Mouth: Mucous membranes are moist.      Pharynx: Oropharynx is clear.   Eyes:      Conjunctiva/sclera: Conjunctivae normal.      Pupils: Pupils are equal, round, and reactive to light.   Cardiovascular:      Rate and Rhythm: Regular rhythm. Bradycardia present.      Heart sounds: Normal heart sounds.   Pulmonary:      Effort: Pulmonary effort is normal.      Comments: Scant wheezes and crackles to bases  No cough, on supplemental 02  Abdominal:      General: Abdomen is flat. Bowel sounds are normal.      Palpations: Abdomen is soft.   Genitourinary:     Comments: deferred  Musculoskeletal:         General: Normal range of motion.      Cervical back: Normal range of motion.      Right lower leg: No edema.      Left lower leg: No edema.   Skin:     General: Skin is warm and dry.      Capillary Refill: Capillary refill takes 2 to 3 seconds.      " "Comments: Heels getting boggy, right more than left   Neurological:      Mental Status: She is alert. Mental status is at baseline.   Psychiatric:         Behavior: Behavior normal.         Last Recorded Vitals  Blood pressure 120/55, pulse 55, temperature 36.5 °C (97.7 °F), temperature source Temporal, resp. rate 16, height 1.473 m (4' 10\"), weight 54.2 kg (119 lb 7.8 oz), SpO2 99%.  Intake/Output last 3 Shifts:  I/O last 3 completed shifts:  In: 800 (14.8 mL/kg) [IV Piggyback:800]  Out: 500 (9.2 mL/kg) [Urine:500 (0.3 mL/kg/hr)]  Weight: 54.2 kg     Relevant Results  No new imaging overnight  Results for orders placed or performed during the hospital encounter of 05/07/24 (from the past 24 hour(s))   CBC   Result Value Ref Range    WBC 7.0 4.4 - 11.3 x10*3/uL    nRBC 0.0 0.0 - 0.0 /100 WBCs    RBC 3.62 (L) 4.00 - 5.20 x10*6/uL    Hemoglobin 12.0 12.0 - 16.0 g/dL    Hematocrit 36.6 36.0 - 46.0 %     (H) 80 - 100 fL    MCH 33.1 26.0 - 34.0 pg    MCHC 32.8 32.0 - 36.0 g/dL    RDW 14.5 11.5 - 14.5 %    Platelets 228 150 - 450 x10*3/uL   Magnesium   Result Value Ref Range    Magnesium 2.10 1.60 - 2.40 mg/dL   Comprehensive Metabolic Panel   Result Value Ref Range    Glucose 115 (H) 74 - 99 mg/dL    Sodium 143 136 - 145 mmol/L    Potassium 3.0 (L) 3.5 - 5.3 mmol/L    Chloride 105 98 - 107 mmol/L    Bicarbonate 29 21 - 32 mmol/L    Anion Gap 12 10 - 20 mmol/L    Urea Nitrogen 13 6 - 23 mg/dL    Creatinine 1.00 0.50 - 1.05 mg/dL    eGFR 52 (L) >60 mL/min/1.73m*2    Calcium 7.7 (L) 8.6 - 10.3 mg/dL    Albumin 3.0 (L) 3.4 - 5.0 g/dL    Alkaline Phosphatase 71 33 - 136 U/L    Total Protein 5.4 (L) 6.4 - 8.2 g/dL    AST 43 (H) 9 - 39 U/L    Bilirubin, Total 1.1 0.0 - 1.2 mg/dL    ALT 26 7 - 45 U/L    Scheduled medications  amLODIPine, 10 mg, oral, q AM  aspirin, 81 mg, oral, Daily  atorvastatin, 40 mg, oral, Nightly  busPIRone, 10 mg, oral, TID  docusate sodium, 100 mg, oral, BID  [Held by provider] enoxaparin, 40 mg, " subcutaneous, q24h  [Held by provider] furosemide, 40 mg, oral, Daily  iohexol, 85 mL, intravenous, Once in imaging  lisinopril, 20 mg, oral, Daily  lubricating eye drops, 2 drop, Both Eyes, BID  mirtazapine, 7.5 mg, oral, q PM  pantoprazole, 40 mg, oral, Daily before breakfast   Or  pantoprazole, 40 mg, intravenous, Daily before breakfast  piperacillin-tazobactam, 2.25 g, intravenous, q6h  potassium chloride, 20 mEq, intravenous, q2h  risperiDONE, 0.25 mg, oral, BID  Tc-99m-albumin, 5.7 millicurie, intravenous, Once in imaging      Continuous medications  [Held by provider] D5 % and 0.9 % sodium chloride, 75 mL/hr, Last Rate: Stopped (05/11/24 1200)      PRN medications  PRN medications: acetaminophen, hydrALAZINE, ipratropium-albuteroL, melatonin, morphine, ondansetron, polyethylene glycol, risperiDONE            Assessment/Plan   89 year old woman with acute cholecystitis(sx improving) along with CHF exacerbation.  Very poor PO and new dysphagia.  Palliative care consulted for goals of care .      CHF exacerbation:  lungs sound much better today  -primary team using furosemide sparingly but has not needed any today    Goals of care:  already DNR/DNI:  Son reports he and family have discussed and would like hospice services at discharge  -continue to honor code status  -Hospice consult placed  -palliative care SW to contact [facility] regarding which providers they use     Anorexia related to cholecystitis, dementia  -continue mirtazapine to 7.5 mg to help increase appetite     Alzheimer's dementia with  behaviors (although controlled at present) and decline in status in the setting of advanced age:  reviewed poor PO intake, ability to rehab with both daughter and son.  Advised consideration of hospice care upon discharge as it appears patient's condition is declining and it is reasonable to consider what type of care they would like at the end of her life  -continue risperidone 0.25 mg BID  -continue buspirone  10 mg TID    Pressure injury both heels, skin intact, no redness, just soft  -elevated off of bed, nursing notified              Gillian Lomeli, APRN-CNP

## 2024-05-14 NOTE — PROGRESS NOTES
Speech-Language Pathology    Inpatient  Speech-Language Pathology Dysphagia Treatment    Patient Name: Juanis Cornejo  MRN: [id number]  : 6/15/1928  Today's Date: 24   Time Calculation  Start Time: 1007  Stop Time: 1030  Time Calculation (min): 23 min        Subjective:     Alert and pleasant.     Objective:  Goals:   Pt. to tolerate least restrictive diet without pulmonary compromise     Therapeutic Swallow Intervention : PO Trials    Treatment Results/ Swallow Comments:  Patient seen for follow up to determine ongoing diet tolerance. Patient slightly reclined in bed upon SLP arrival to room while nursing administering po meds. SLP adjusted HOB to achieve full upright positioning to maximize swallowing safety. Increased chest congestion noted with documentation of worsening CHF. Honey liquids and purees consumed with slowed bolus formation. Patient spontaneously reswallows with boluses tested. Liquids administered via tsp to reduce bolus size. No overt s/s aspiration detected with voice remaining clear post swallow. Adequate oral clearance achieved with good oral containment with boluses presented.     Assessment:  SLP TX Intervention Outcome: Making Progress Towards Goals    Patient with adequate tolerance of puree foods and honey thick liquids. Question safety with diet advancement given generalized weakness. Oral intake remains reduced. Patient requires modification of diet to reduce aspiration risk. Ongoing SLP services needed to ensure ongoing diet tolerance and for reinforcement of compensatory swallowing strategies.    Plan:    CONTINUE PUREE/HONEY LIQUID DIET  SLP TO FOLLOW    SLP TX Plan: Continue Plan of Care  SLP Plan: Skilled SLP  SLP Frequency: 3x per week  Duration: Current admission  SLP Discharge Recommendations: Continue skilled speech therapy services post discharge  Discussed POC: Patient  Discussed Risks/Benefits: Yes  Patient/Caregiver Agreeable: Yes

## 2024-05-14 NOTE — PROGRESS NOTES
"PALLIATIVE CARE SOCIAL WORK NOTE     Hospice order received this morning. Dr Agarwal anticipates discharge in \"a couple of days.\" Pt is a 89 year old female with advanced dementia, a long term resident of [facility]. I reached out to [facility] ([phone], spoke with Vickie) to see which hospice agencies they worked with. Son agreeable to referral being made to [facility]. Spoke at length with son Keith and his wife via phone. Hospice questions answered. Referral made to [facility] via [facility]. I spoke with Deborah at [facility] ([phone]). [facility] to review the information and will then reach out to son. They will not see pt until she is back at the assisted living facility. Will continue to follow and will update [facility] as appropriate. Thank you.     VINCENZO Paige   "

## 2024-05-14 NOTE — CARE PLAN
The patient's goals for the shift include  Pt will have pain control and remain safe    The clinical goals for the shift include Pt will remain HDS    Problem: Safety  Goal: Patient will be injury free during hospitalization  Outcome: Progressing  Goal: I will remain free of falls  Outcome: Progressing     Problem: Pain  Goal: My pain/discomfort is manageable  Outcome: Progressing     Problem: Daily Care  Goal: Daily care needs are met  Outcome: Progressing

## 2024-05-14 NOTE — PROGRESS NOTES
05/14/24 1117   Discharge Planning   Care [facility]ice     Hospice consult placed. SW placing referral to agency that goes to her facility. They do not come to the hospital so they will see here at AL when discharged. Per MD adod a couple of days.

## 2024-05-15 LAB
ALBUMIN SERPL BCP-MCNC: 3.2 G/DL (ref 3.4–5)
ALP SERPL-CCNC: 75 U/L (ref 33–136)
ALT SERPL W P-5'-P-CCNC: 23 U/L (ref 7–45)
ANION GAP SERPL CALC-SCNC: 13 MMOL/L (ref 10–20)
AST SERPL W P-5'-P-CCNC: 36 U/L (ref 9–39)
BILIRUB SERPL-MCNC: 1.3 MG/DL (ref 0–1.2)
BUN SERPL-MCNC: 14 MG/DL (ref 6–23)
CALCIUM SERPL-MCNC: 7.9 MG/DL (ref 8.6–10.3)
CHLORIDE SERPL-SCNC: 104 MMOL/L (ref 98–107)
CO2 SERPL-SCNC: 26 MMOL/L (ref 21–32)
CREAT SERPL-MCNC: 0.92 MG/DL (ref 0.5–1.05)
EGFRCR SERPLBLD CKD-EPI 2021: 57 ML/MIN/1.73M*2
ERYTHROCYTE [DISTWIDTH] IN BLOOD BY AUTOMATED COUNT: 14.3 % (ref 11.5–14.5)
GLUCOSE BLD MANUAL STRIP-MCNC: 114 MG/DL (ref 74–99)
GLUCOSE SERPL-MCNC: 110 MG/DL (ref 74–99)
HCT VFR BLD AUTO: 38 % (ref 36–46)
HGB BLD-MCNC: 12.7 G/DL (ref 12–16)
MAGNESIUM SERPL-MCNC: 2 MG/DL (ref 1.6–2.4)
MCH RBC QN AUTO: 33.2 PG (ref 26–34)
MCHC RBC AUTO-ENTMCNC: 33.4 G/DL (ref 32–36)
MCV RBC AUTO: 100 FL (ref 80–100)
NRBC BLD-RTO: 0 /100 WBCS (ref 0–0)
PLATELET # BLD AUTO: 227 X10*3/UL (ref 150–450)
POTASSIUM SERPL-SCNC: 3.1 MMOL/L (ref 3.5–5.3)
PROT SERPL-MCNC: 5.8 G/DL (ref 6.4–8.2)
RBC # BLD AUTO: 3.82 X10*6/UL (ref 4–5.2)
SODIUM SERPL-SCNC: 140 MMOL/L (ref 136–145)
WBC # BLD AUTO: 9.7 X10*3/UL (ref 4.4–11.3)

## 2024-05-15 PROCEDURE — 85027 COMPLETE CBC AUTOMATED: CPT | Performed by: NURSE PRACTITIONER

## 2024-05-15 PROCEDURE — 2500000004 HC RX 250 GENERAL PHARMACY W/ HCPCS (ALT 636 FOR OP/ED): Performed by: NURSE PRACTITIONER

## 2024-05-15 PROCEDURE — 2500000005 HC RX 250 GENERAL PHARMACY W/O HCPCS: Performed by: NURSE PRACTITIONER

## 2024-05-15 PROCEDURE — 2500000001 HC RX 250 WO HCPCS SELF ADMINISTERED DRUGS (ALT 637 FOR MEDICARE OP): Performed by: NURSE PRACTITIONER

## 2024-05-15 PROCEDURE — C9113 INJ PANTOPRAZOLE SODIUM, VIA: HCPCS | Performed by: NURSE PRACTITIONER

## 2024-05-15 PROCEDURE — 83735 ASSAY OF MAGNESIUM: CPT | Performed by: NURSE PRACTITIONER

## 2024-05-15 PROCEDURE — 36415 COLL VENOUS BLD VENIPUNCTURE: CPT | Performed by: NURSE PRACTITIONER

## 2024-05-15 PROCEDURE — 99232 SBSQ HOSP IP/OBS MODERATE 35: CPT | Performed by: NURSE PRACTITIONER

## 2024-05-15 PROCEDURE — 82947 ASSAY GLUCOSE BLOOD QUANT: CPT

## 2024-05-15 PROCEDURE — 80053 COMPREHEN METABOLIC PANEL: CPT | Performed by: NURSE PRACTITIONER

## 2024-05-15 PROCEDURE — 2500000001 HC RX 250 WO HCPCS SELF ADMINISTERED DRUGS (ALT 637 FOR MEDICARE OP): Performed by: FAMILY MEDICINE

## 2024-05-15 PROCEDURE — 92526 ORAL FUNCTION THERAPY: CPT | Mod: GN

## 2024-05-15 PROCEDURE — 1100000001 HC PRIVATE ROOM DAILY

## 2024-05-15 RX ORDER — FUROSEMIDE 20 MG/1
20 TABLET ORAL DAILY
Status: DISCONTINUED | OUTPATIENT
Start: 2024-05-15 | End: 2024-05-16 | Stop reason: HOSPADM

## 2024-05-15 RX ORDER — AMLODIPINE BESYLATE 5 MG/1
5 TABLET ORAL EVERY MORNING
Status: DISCONTINUED | OUTPATIENT
Start: 2024-05-16 | End: 2024-05-16 | Stop reason: HOSPADM

## 2024-05-15 RX ADMIN — DOCUSATE SODIUM 100 MG: 100 CAPSULE, LIQUID FILLED ORAL at 09:23

## 2024-05-15 RX ADMIN — BUSPIRONE HYDROCHLORIDE 10 MG: 10 TABLET ORAL at 09:23

## 2024-05-15 RX ADMIN — PIPERACILLIN SODIUM AND TAZOBACTAM SODIUM 2.25 G: 2; .25 INJECTION, SOLUTION INTRAVENOUS at 22:54

## 2024-05-15 RX ADMIN — RISPERIDONE 0.25 MG: 0.25 TABLET, FILM COATED ORAL at 20:44

## 2024-05-15 RX ADMIN — FUROSEMIDE 20 MG: 20 TABLET ORAL at 12:29

## 2024-05-15 RX ADMIN — ATORVASTATIN CALCIUM 40 MG: 40 TABLET, FILM COATED ORAL at 20:44

## 2024-05-15 RX ADMIN — MIRTAZAPINE 7.5 MG: 15 TABLET, FILM COATED ORAL at 20:44

## 2024-05-15 RX ADMIN — LISINOPRIL 20 MG: 20 TABLET ORAL at 09:23

## 2024-05-15 RX ADMIN — PIPERACILLIN SODIUM AND TAZOBACTAM SODIUM 2.25 G: 2; .25 INJECTION, SOLUTION INTRAVENOUS at 03:59

## 2024-05-15 RX ADMIN — CARBOXYMETHYLCELLULOSE SODIUM 2 DROP: 0.5 SOLUTION/ DROPS OPHTHALMIC at 20:44

## 2024-05-15 RX ADMIN — BUSPIRONE HYDROCHLORIDE 10 MG: 10 TABLET ORAL at 20:44

## 2024-05-15 RX ADMIN — PANTOPRAZOLE SODIUM 40 MG: 40 INJECTION, POWDER, FOR SOLUTION INTRAVENOUS at 06:20

## 2024-05-15 RX ADMIN — PIPERACILLIN SODIUM AND TAZOBACTAM SODIUM 2.25 G: 2; .25 INJECTION, SOLUTION INTRAVENOUS at 16:29

## 2024-05-15 RX ADMIN — BUSPIRONE HYDROCHLORIDE 10 MG: 10 TABLET ORAL at 16:26

## 2024-05-15 RX ADMIN — PIPERACILLIN SODIUM AND TAZOBACTAM SODIUM 2.25 G: 2; .25 INJECTION, SOLUTION INTRAVENOUS at 09:19

## 2024-05-15 RX ADMIN — ASPIRIN 81 MG: 81 TABLET, COATED ORAL at 09:23

## 2024-05-15 RX ADMIN — RISPERIDONE 0.25 MG: 0.25 TABLET, FILM COATED ORAL at 09:23

## 2024-05-15 RX ADMIN — CARBOXYMETHYLCELLULOSE SODIUM 2 DROP: 0.5 SOLUTION/ DROPS OPHTHALMIC at 09:22

## 2024-05-15 ASSESSMENT — COGNITIVE AND FUNCTIONAL STATUS - GENERAL
MOBILITY SCORE: 12
DRESSING REGULAR UPPER BODY CLOTHING: A LOT
TOILETING: A LOT
TURNING FROM BACK TO SIDE WHILE IN FLAT BAD: A LOT
STANDING UP FROM CHAIR USING ARMS: A LOT
CLIMB 3 TO 5 STEPS WITH RAILING: TOTAL
WALKING IN HOSPITAL ROOM: A LOT
PERSONAL GROOMING: A LOT
MOVING TO AND FROM BED TO CHAIR: A LOT
DAILY ACTIVITIY SCORE: 12
DRESSING REGULAR LOWER BODY CLOTHING: A LOT
MOVING FROM LYING ON BACK TO SITTING ON SIDE OF FLAT BED WITH BEDRAILS: A LITTLE
EATING MEALS: A LOT
HELP NEEDED FOR BATHING: A LOT

## 2024-05-15 ASSESSMENT — PAIN - FUNCTIONAL ASSESSMENT: PAIN_FUNCTIONAL_ASSESSMENT: 0-10

## 2024-05-15 ASSESSMENT — PAIN SCALES - GENERAL
PAINLEVEL_OUTOF10: 0 - NO PAIN
PAINLEVEL_OUTOF10: 0 - NO PAIN

## 2024-05-15 NOTE — PROGRESS NOTES
PALLIATIVE CARE SOCIAL WORK NOTE     Per Dr Agarwal, pt should be ready for discharge tomorrow. I have notified Deborah at [facility] ([phone]) of anticipated discharge for tomorrow. [facility] has scheduled a meeting with pt and son Keith for tomorrow afternoon 5/16 at 2:00pm, at [facility], to sign consents and initiate hospice services. Care Coordination to coordinate return to [facility] and [facility] will meet her there.      [facility] has arranged a 10:00am ambulance  for tomorrow morning. [facility] notified. Thank you    VINCENZO Paige

## 2024-05-15 NOTE — PROGRESS NOTES
Speech-Language Pathology    Inpatient  Speech-Language Pathology Dysphagia Treatment    Patient Name: Juanis Cornejo  MRN: [id number]  : 6/15/1928  Today's Date: 05/15/24   Time Calculation  Start Time: 1328  Stop Time: 1352  Time Calculation (min): 24 min        Subjective:     Alert and pleasantly confused. Son at bedside.    Objective:  Goals:   Pt. to tolerate least restrictive diet without pulmonary compromise     Therapeutic Swallow Intervention : PO Trials    Treatment Results/ Swallow Comments: Patient seen for dysphagia treatment. Baseline cough noted with increased chest congestion. Per medical record exacerbation of CHF reported. Voice clear. Patient willing to consume small quantities of puree and honey liquids via tsp assisted by SLP. HOB elevated prior to bolus presentations.  Bolus formation slowed with adequate oral clearance achieved. Spontaneous reswallows completed intermittently. Cough noted inconsistently during assessment and appeared equivalent to baseline. Vocal quality remained clear post swallows. Patient requesting to discontinue trials following small quantity consumed. Educated son on current diet with limited potential for diet advancement. Noted plan for transitioning to hospice care. Reinforcement of upright position and to feed when fully alert. Informed son that aspiration risk high if attempting to feed patient modified food/liquid consistencies if mental status altered and patient lethargic. Advised of option to liberalize diet if GOC dictated (I.e. hospice/palliative care), however acknowledgement of likelihood of aspiration if unthickened liquids administered to patient.     Assessment:  SLP TX Intervention Outcome: Making Progress Towards Goals    Patient with limited oral intake and not likely to meet nutritional needs orally. Patient admitting to reduced appetite with no desire to eat/drink. Given plan to proceed with hospice care upon transfer to [facility], do not feel further  therapy would be required. No alternative source of nutrition/hydration/medication would be an option if patient receiving hospice care. May consider diet liberalization, per patient request, for pleasure with acceptance of aspiration risk.     Plan:    CONTINUE PUREE/HONEY DIET. CONSIDER DIET LIBERALIZATION IF TRANSITIONING TO HOSPICE CARE PER PATIENT REQUEST  DISCONTINUE SLP SERVICES    SLP TX Plan: Discharge from Speech Therapy  SLP Plan: No skilled SLP  SLP Discharge Recommendations:  (GOC with plan to transition to hospice.)  Discussed POC: Caregiver/family  Discussed Risks/Benefits: Yes  Patient/Caregiver Agreeable: Yes

## 2024-05-15 NOTE — CONSULTS
INFECTIOUS DISEASE INPATIENT INITIAL CONSULTATION    Referred By: Newton Agarwal    Reason For Consult: pt with cholecystitis being treated wtih iv antibiotics, please eval for discharge antibiotics and duration     HPI:  This is a 89 y.o. female with PMH of Alzheimer's dementia, HTN, heart failure who presented on 5/7 with abd pain, n/v.    She is not able to give history due to dementia. Has acute cholecystitis. Surgery and IR have evaluated. High risk for cholecystectomy and perc curtis is not deemed to be needed right now. She may be going to hospice. Has been on IV Zosyn.     Allergies:  Patient has no known allergies.     Vitals (Last 24 Hours):  Heart Rate:  [53-66]   Temp:  [36.1 °C (97 °F)-36.7 °C (98.1 °F)]   Resp:  [12-18]   BP: ()/(33-70)   SpO2:  [90 %-99 %]      PHYSICAL EXAM:  Gen - agitated, in bed  Heart - RRR  Lungs - no wheezing  Abd - soft, no ttp, BS present  Skin - no rash    MEDS:    Current Facility-Administered Medications:     acetaminophen (Tylenol) tablet 650 mg, 650 mg, oral, q6h PRN, Silvia Pena PA-C, 650 mg at 05/13/24 2148    [Held by provider] amLODIPine (Norvasc) tablet 10 mg, 10 mg, oral, q AM, Charis L Northeim, APRN-CNP, 10 mg at 05/14/24 1010    aspirin EC tablet 81 mg, 81 mg, oral, Daily, Charis L Yvaneim, APRN-CNP, 81 mg at 05/14/24 1010    atorvastatin (Lipitor) tablet 40 mg, 40 mg, oral, Nightly, Charis L Northeim, APRN-CNP, 40 mg at 05/14/24 2132    busPIRone (Buspar) tablet 10 mg, 10 mg, oral, TID, Charis L Yvaneim, APRN-CNP, 10 mg at 05/14/24 2132    [Held by provider] D5 % and 0.9 % sodium chloride infusion, 75 mL/hr, intravenous, Continuous, Newton Agarwal MD, Stopped at 05/11/24 1200    docusate sodium (Colace) capsule 100 mg, 100 mg, oral, BID, SHEILA Aguero, 100 mg at 05/14/24 2132    [Held by provider] enoxaparin (Lovenox) syringe 40 mg, 40 mg, subcutaneous, q24h, SHEILA Aguero    [Held by provider] furosemide  (Lasix) tablet 40 mg, 40 mg, oral, Daily, Charis Grajeda APRN-CNP    hydrALAZINE (Apresoline) injection 5 mg, 5 mg, intravenous, q8h PRN, Luke Ortiz MD    iohexol (OMNIPaque) 350 mg iodine/mL solution 85 mL, 85 mL, intravenous, Once in imaging, Charis Grajeda APRN-BALWINDER    ipratropium-albuteroL (Duo-Neb) 0.5-2.5 mg/3 mL nebulizer solution 3 mL, 3 mL, nebulization, q2h PRN, Cary Drake MD, 3 mL at 05/12/24 1626    lisinopril tablet 20 mg, 20 mg, oral, Daily, CASH Aguero-CNP, 20 mg at 05/14/24 1009    lubricating eye drops ophthalmic solution 2 drop, 2 drop, Both Eyes, BID, CASH Aguero-CNP, 2 drop at 05/14/24 2132    melatonin tablet 3 mg, 3 mg, oral, Nightly PRN, Charis Grajeda APRN-CNP, 3 mg at 05/07/24 2213    mirtazapine (Remeron) tablet 7.5 mg, 7.5 mg, oral, q PM, Gillian Lomeli, CASH-CNP, 7.5 mg at 05/14/24 2132    morphine injection 1 mg, 1 mg, intravenous, q4h PRN, Newton Agarwal MD, 1 mg at 05/13/24 0247    ondansetron (Zofran) injection 4 mg, 4 mg, intravenous, q8h PRN, Charis Grajeda APRN-CNP    pantoprazole (ProtoNix) EC tablet 40 mg, 40 mg, oral, Daily before breakfast **OR** pantoprazole (ProtoNix) injection 40 mg, 40 mg, intravenous, Daily before breakfast, Charis Grajeda APRN-CNP, 40 mg at 05/15/24 0620    piperacillin-tazobactam-dextrose (Zosyn) IV 2.25 g, 2.25 g, intravenous, q6h, Charis SHEILA Bolaños, Stopped at 05/15/24 0429    polyethylene glycol (Glycolax, Miralax) packet 17 g, 17 g, oral, Daily PRN, SHEILA Aguero    risperiDONE (RisperDAL) tablet 0.25 mg, 0.25 mg, oral, BID, SHEILA Aguero, 0.25 mg at 05/14/24 7802    risperiDONE (RisperDAL) tablet 0.25 mg, 0.25 mg, oral, Daily PRN, SHEILA Aguero    Tc-99m-albumin (Draximage MAA) injection 5.7 millicurie, 5.7 millicurie, intravenous, Once in imaging, SHEILA Dupree     LABS:  Lab Results   Component Value Date     WBC 9.7 05/15/2024    HGB 12.7 05/15/2024    HCT 38.0 05/15/2024     05/15/2024     05/15/2024      Results from last 72 hours   Lab Units 05/15/24  0556   SODIUM mmol/L 140   POTASSIUM mmol/L 3.1*   CHLORIDE mmol/L 104   CO2 mmol/L 26   BUN mg/dL 14   CREATININE mg/dL 0.92   GLUCOSE mg/dL 110*   CALCIUM mg/dL 7.9*   ANION GAP mmol/L 13   EGFR mL/min/1.73m*2 57*     Results from last 72 hours   Lab Units 05/15/24  0556   ALK PHOS U/L 75   BILIRUBIN TOTAL mg/dL 1.3*   PROTEIN TOTAL g/dL 5.8*   ALT U/L 23   AST U/L 36   ALBUMIN g/dL 3.2*     Estimated Creatinine Clearance: 25.5 mL/min (by C-G formula based on SCr of 0.92 mg/dL).    IMAGING:  CXR 5/14  IMPRESSION:  Left-greater-than-right infiltrates and/or pleural effusions,  increased on the right from 3 days prior.    CT A/P 5/7  IMPRESSION:  Small nonspecific bilateral pleural effusions with associated  posterior bibasilar lung atelectasis.    Punctate nonobstructing stone in the upper pole of the left kidney.    Scattered colonic diverticulosis without acute associated  inflammation.    Nonspecific mesenteric adenopathy with associated fat stranding  medial to the cecum and right colon. Question mesenteric adenitis.    Nonspecific gallbladder wall thickening with mild adjacent edema but  without calcified stone. Cannot exclude noncalcified gallstones.  Recommend further evaluation with gallbladder ultrasound to exclude  acute cholecystitis.    Mild nonspecific abdominal and pelvic ascites as described.    Previous hysterectomy.    Arthritic changes in the spine as described.      ASSESSMENT/PLAN:    Acute Cholecystitis - managing medically in this frail/elderly patient with severe Alzheimer's dementia. Risks of surgery and/or perc neph tube likely outweigh benefits.   CKD - CrCl only 25    IV Zosyn here is fine. Can be on PO Augmentin 500mg Q12H (low dose for low weight and also renally dosed for CKD) for 2 more weeks. Can use tablets and crush to  give if has difficulty with pills or could be liquid too.    Will sign off. Please call back with questions. Thanks!    Adrian Mccarty MD  [facility]  Office #[phone]

## 2024-05-15 NOTE — PROGRESS NOTES
05/15/24 1247   Discharge Planning   Patient expects to be discharged to: [facility]     Conversations with Dr. Agarwal and hospice SW. Plan for patient to dc tomorrow morning and son has meeting with [facility] at 2pm tomorrow. Transport requested for 10am 5-16. I spoke with  at [facility] to update her. She is aware of dc tomorrow morning. Will need dc paperwork faxed to [phone]. Requested this be updated today by MD so I can send. I also spoke with her son Keith to update him on plan and he is agreeable. Medical team updated.     Addendum 1412- DC summary not available yet. Will need to be faxed to [phone] when completed.

## 2024-05-15 NOTE — CARE PLAN
Problem: Pain  Goal: My pain/discomfort is manageable  Outcome: Progressing     Problem: Safety  Goal: Patient will be injury free during hospitalization  Outcome: Progressing  Goal: I will remain free of falls  Outcome: Progressing     Problem: Daily Care  Goal: Daily care needs are met  Outcome: Progressing     Problem: Psychosocial Needs  Goal: Demonstrates ability to cope with hospitalization/illness  Outcome: Progressing  Goal: Collaborate with me, my family, and caregiver to identify my specific goals  Outcome: Progressing     Problem: Discharge Barriers  Goal: My discharge needs are met  Outcome: Progressing     Problem: Skin  Goal: Decreased wound size/increased tissue granulation at next dressing change  Outcome: Progressing  Flowsheets (Taken 5/8/2024 1121 by Jose Martin Das, RN)  Decreased wound size/increased tissue granulation at next dressing change: Protective dressings over bony prominences  Goal: Participates in plan/prevention/treatment measures  Outcome: Progressing  Flowsheets (Taken 5/8/2024 1121 by Jose Martin Das, RN)  Participates in plan/prevention/treatment measures: Increase activity/out of bed for meals  Goal: Prevent/manage excess moisture  Outcome: Progressing  Flowsheets (Taken 5/15/2024 1822)  Prevent/manage excess moisture: Cleanse incontinence/protect with barrier cream  Goal: Prevent/minimize sheer/friction injuries  Outcome: Progressing  Flowsheets (Taken 5/15/2024 1822)  Prevent/minimize sheer/friction injuries: Use pull sheet  Goal: Promote/optimize nutrition  Outcome: Progressing  Flowsheets (Taken 5/8/2024 1121 by Jose Martin Das, RN)  Promote/optimize nutrition: Monitor/record intake including meals  Goal: Promote skin healing  Outcome: Progressing  Flowsheets (Taken 5/8/2024 1121 by Jose Martin Das, RN)  Promote skin healing:   Assess skin/pad under line(s)/device(s)   Turn/reposition every 2 hours/use positioning/transfer devices     Problem: Pain - Adult  Goal:  Verbalizes/displays adequate comfort level or baseline comfort level  Outcome: Progressing     Problem: Safety - Adult  Goal: Free from fall injury  Outcome: Progressing     Problem: Discharge Planning  Goal: Discharge to home or other facility with appropriate resources  Outcome: Progressing     Problem: Chronic Conditions and Co-morbidities  Goal: Patient's chronic conditions and co-morbidity symptoms are monitored and maintained or improved  Outcome: Progressing     Problem: Fall/Injury  Goal: Not fall by end of shift  Outcome: Progressing  Goal: Be free from injury by end of the shift  Outcome: Progressing  Goal: Verbalize understanding of personal risk factors for fall in the hospital  Outcome: Progressing  Goal: Verbalize understanding of risk factor reduction measures to prevent injury from fall in the home  Outcome: Progressing  Goal: Use assistive devices by end of the shift  Outcome: Progressing  Goal: Pace activities to prevent fatigue by end of the shift  Outcome: Progressing     Problem: Pain  Goal: Takes deep breaths with improved pain control throughout the shift  Outcome: Progressing  Goal: Turns in bed with improved pain control throughout the shift  Outcome: Progressing  Goal: Walks with improved pain control throughout the shift  Outcome: Progressing  Goal: Performs ADL's with improved pain control throughout shift  Outcome: Progressing  Goal: Participates in PT with improved pain control throughout the shift  Outcome: Progressing  Goal: Free from opioid side effects throughout the shift  Outcome: Progressing  Goal: Free from acute confusion related to pain meds throughout the shift  Outcome: Progressing   The patient's goals for the shift include  getting rest    The clinical goals for the shift include pt will remain comfotable through out the shift

## 2024-05-15 NOTE — PROGRESS NOTES
"Nutrition Follow-up Note  Nutrition Assessment     Hospital day #8 with RUQ pain, diagnosis of cholecystitis and has been treated conservatively.   POC is to discharge tomorrow to SNF with Hospice services.  Nutrition therapy services not warranted at this time, will sign off.  Please consult  if services needed.    History:  Food and Nutrient History  Energy Intake: Poor < 50 %  Food and Nutrient History: Very lethargic, requires 1:1 feed when alert.    Anthropometrics:  Height: 147.3 cm (4' 10\")  Weight: 54.2 kg (119 lb 7.8 oz)  BMI (Calculated): 24.98  Weight Change  Significant Weight Loss: No     Energy Needs:  Calculated Energy Needs Using Equations  Height: 147.3 cm (4' 10\")  Temp: 36.9 °C (98.5 °F)    Estimated Energy Needs  Total Energy Estimated Needs (kCal): 1355 kCal  Total Estimated Energy Need per Day (kCal/kg): 1625 kCal/kg  Method for Estimating Needs: 25-30    Estimated Protein Needs  Total Protein Estimated Needs (g): 45 g  Total Protein Estimated Needs (g/kg): 55 g/kg  Method for Estimating Needs: 0.8-1.0    Estimated Fluid Needs  Method for Estimating Needs: 1ml/kcal or per MD     Dietary Orders   Adult diet Regular; Pureed 4; Moderately thick 3; 1:1 Feeding  Comments: ONLY FEED IF AWAKE AND ALERT    Nutrition Diagnosis      Patient has Nutrition Diagnosis: Yes  Nutrition Diagnosis 1: Inadequate oral intake  Diagnosis Status (1): New  Related to (1): acute illness  As Evidenced by (1): pt NPO until MBSS completed     Nutrition Interventions/Recommendations   Nutrition Prescription  Individualized Nutrition Prescription Provided for : Diet advanced to pureed with moderately thick liquids for pleasure feeding.    Food and/or Nutrient Delivery Interventions  Interventions: Meals and snacks  Meals and Snacks:  (for comfort as patient requests)     Nutrition Monitoring and Evaluation   Nutrition Focused Physical Findings   Digestive System: Diarrhea, Constipation, Abdominal distension, Ascites, " Nausea, Vomiting  Criteria: daily    Follow Up  Last Date of Nutrition Visit: 05/15/24  Nutrition Follow-Up Needed?: Dietitian to reassess per policy  Follow up Comment: hospice-TR

## 2024-05-15 NOTE — PROGRESS NOTES
"Juanis Cornejo is a 89 y.o. female on day 7 of admission presenting with RUQ abdominal pain.  She presented to our ED 5/7/24 with c/o ABD pain, found to have cholecystitis and has been treated conservatively. Has also developed a CHF exacerbation and is currently on furosemide IV. She is already DNR/DNI, lives in [facility]. Per discussion yesterday with primary proxy (daughter Gillian), she is ceding day to day management decisions to brother Keith but they are in conversation about big decisions and on the same page so far with care.  Plan is for discharge to hospice tomorrow.    Subjective   Symptoms (0 - 10, Best to Worst)  Ballinger Symptom Assessment System  Pain Score: 0 - No pain  Unable to provide ROS due to dementia       Objective     Physical Exam  Constitutional:       Appearance: She is ill-appearing.   HENT:      Head: Normocephalic.      Nose: Nose normal.      Mouth/Throat:      Mouth: Mucous membranes are moist.      Pharynx: Oropharynx is clear.   Eyes:      Conjunctiva/sclera: Conjunctivae normal.      Pupils: Pupils are equal, round, and reactive to light.   Cardiovascular:      Rate and Rhythm: Normal rate and regular rhythm.      Heart sounds: Normal heart sounds.   Pulmonary:      Effort: Pulmonary effort is normal.      Breath sounds: Rhonchi present.   Abdominal:      General: Abdomen is flat. Bowel sounds are normal.      Palpations: Abdomen is soft.   Genitourinary:     Comments: deferred  Musculoskeletal:      Cervical back: Normal range of motion.      Right lower leg: No edema.      Left lower leg: No edema.   Skin:     General: Skin is warm and dry.   Neurological:      Mental Status: She is alert. Mental status is at baseline.   Psychiatric:         Mood and Affect: Mood normal.         Behavior: Behavior normal.         Last Recorded Vitals  Blood pressure 133/74, pulse 57, temperature 36.9 °C (98.5 °F), resp. rate 18, height 1.473 m (4' 10\"), weight 54.2 " kg (119 lb 7.8 oz), SpO2 96%.  Intake/Output last 3 Shifts:  I/O last 3 completed shifts:  In: 593.8 (11 mL/kg) [I.V.:393.8 (7.3 mL/kg); IV Piggyback:200]  Out: - (0 mL/kg)   Weight: 54.2 kg     Relevant Results     XR chest 1 view [[id number]] Collected: 05/14/24 1154   Order Status: Completed Updated: 05/14/24 1154   Narrative:     Interpreted By:  oRsanne Awan,  STUDY:  XR CHEST 1 VIEW;  5/14/2024 11:38 am      INDICATION:  Signs/Symptoms:SOB.      COMPARISON:  05/11/2024      ACCESSION NUMBER(S):  [id number]      ORDERING CLINICIAN:  REGAN PULIDO      FINDINGS:  Artifact from overlying monitoring leads noted. Patient is slightly  rotated. Bilateral perihilar and basilar opacities with blunting of  the costophrenic angles similar in the left, increased on the right.  Cardiac silhouette is partially obscured bibasilar opacities but  likely mildly enlarged without change.       Impression:     Left-greater-than-right infiltrates and/or pleural effusions,  increased on the right from 3 days prior.      MACRO:  None.      Signed by: Rosanne Awan 5/14/2024 11:53 AM  Dictation workstation:   [id number]     Results for orders placed or performed during the hospital encounter of 05/07/24 (from the past 24 hour(s))   CBC   Result Value Ref Range    WBC 9.7 4.4 - 11.3 x10*3/uL    nRBC 0.0 0.0 - 0.0 /100 WBCs    RBC 3.82 (L) 4.00 - 5.20 x10*6/uL    Hemoglobin 12.7 12.0 - 16.0 g/dL    Hematocrit 38.0 36.0 - 46.0 %     80 - 100 fL    MCH 33.2 26.0 - 34.0 pg    MCHC 33.4 32.0 - 36.0 g/dL    RDW 14.3 11.5 - 14.5 %    Platelets 227 150 - 450 x10*3/uL   Magnesium   Result Value Ref Range    Magnesium 2.00 1.60 - 2.40 mg/dL   Comprehensive Metabolic Panel   Result Value Ref Range    Glucose 110 (H) 74 - 99 mg/dL    Sodium 140 136 - 145 mmol/L    Potassium 3.1 (L) 3.5 - 5.3 mmol/L    Chloride 104 98 - 107 mmol/L    Bicarbonate 26 21 - 32 mmol/L    Anion Gap 13 10 - 20 mmol/L    Urea Nitrogen 14 6 - 23 mg/dL     Creatinine 0.92 0.50 - 1.05 mg/dL    eGFR 57 (L) >60 mL/min/1.73m*2    Calcium 7.9 (L) 8.6 - 10.3 mg/dL    Albumin 3.2 (L) 3.4 - 5.0 g/dL    Alkaline Phosphatase 75 33 - 136 U/L    Total Protein 5.8 (L) 6.4 - 8.2 g/dL    AST 36 9 - 39 U/L    Bilirubin, Total 1.3 (H) 0.0 - 1.2 mg/dL    ALT 23 7 - 45 U/L   POCT GLUCOSE   Result Value Ref Range    POCT Glucose 114 (H) 74 - 99 mg/dL    Scheduled medications  [START ON 5/16/2024] amLODIPine, 5 mg, oral, q AM  aspirin, 81 mg, oral, Daily  atorvastatin, 40 mg, oral, Nightly  busPIRone, 10 mg, oral, TID  docusate sodium, 100 mg, oral, BID  [Held by provider] enoxaparin, 40 mg, subcutaneous, q24h  furosemide, 20 mg, oral, Daily  iohexol, 85 mL, intravenous, Once in imaging  lisinopril, 20 mg, oral, Daily  lubricating eye drops, 2 drop, Both Eyes, BID  mirtazapine, 7.5 mg, oral, q PM  pantoprazole, 40 mg, oral, Daily before breakfast   Or  pantoprazole, 40 mg, intravenous, Daily before breakfast  piperacillin-tazobactam, 2.25 g, intravenous, q6h  risperiDONE, 0.25 mg, oral, BID  Tc-99m-albumin, 5.7 millicurie, intravenous, Once in imaging      Continuous medications  [Held by provider] D5 % and 0.9 % sodium chloride, 75 mL/hr, Last Rate: Stopped (05/11/24 1200)      PRN medications  PRN medications: acetaminophen, hydrALAZINE, ipratropium-albuteroL, melatonin, morphine, ondansetron, polyethylene glycol, risperiDONE       Assessment/Plan     89 year old woman with acute cholecystitis(sx improving) along with CHF exacerbation.  Very poor PO and new dysphagia.  Palliative care consulted for goals of care .       CHF exacerbation:    -primary team using furosemide sparingly but has not needed any today     Goals of care:  already DNR/DNI:  Son reports he and family have discussed and would like hospice services at discharge  -continue to honor code status  -Hospice consult placed  -palliative care SW to contact Simi OLVERA regarding which providers they use  -will place [place]  Portable form for dsicharge     Anorexia related to cholecystitis, dementia  -continue mirtazapine to 7.5 mg to help increase appetite     Alzheimer's dementia with  behaviors (although controlled at present) and decline in status in the setting of advanced age:  reviewed poor PO intake, ability to rehab with both daughter and son.  Advised consideration of hospice care upon discharge as it appears patient's condition is declining and it is reasonable to consider what type of care they would like at the end of her life  -continue risperidone 0.25 mg BID  -continue buspirone 10 mg TID     Pressure injury both heels, skin intact, no redness, just soft  -elevated off of bed, nursing notified                   Gillian Lomeli, CASH-CNP

## 2024-05-15 NOTE — PROGRESS NOTES
Juanis Cornejo is a 89 y.o. female     Pain is little better today  She is more awake,   However still has pain expression on her face  She is unable to provide much hx  No chest pain  No shortness of breath    Review of Systems           Vitals:    05/15/24 1139   BP: 133/74   Pulse: 57   Resp: 18   Temp: 36.9 °C (98.5 °F)   SpO2: 96%        Scheduled medications  [Held by provider] amLODIPine, 10 mg, oral, q AM  aspirin, 81 mg, oral, Daily  atorvastatin, 40 mg, oral, Nightly  busPIRone, 10 mg, oral, TID  docusate sodium, 100 mg, oral, BID  [Held by provider] enoxaparin, 40 mg, subcutaneous, q24h  [Held by provider] furosemide, 40 mg, oral, Daily  iohexol, 85 mL, intravenous, Once in imaging  lisinopril, 20 mg, oral, Daily  lubricating eye drops, 2 drop, Both Eyes, BID  mirtazapine, 7.5 mg, oral, q PM  pantoprazole, 40 mg, oral, Daily before breakfast   Or  pantoprazole, 40 mg, intravenous, Daily before breakfast  piperacillin-tazobactam, 2.25 g, intravenous, q6h  risperiDONE, 0.25 mg, oral, BID  Tc-99m-albumin, 5.7 millicurie, intravenous, Once in imaging      Continuous medications  [Held by provider] D5 % and 0.9 % sodium chloride, 75 mL/hr, Last Rate: Stopped (05/11/24 1200)      PRN medications  PRN medications: acetaminophen, hydrALAZINE, ipratropium-albuteroL, melatonin, morphine, ondansetron, polyethylene glycol, risperiDONE    Lab Review   Results from last 7 days   Lab Units 05/15/24  0556 05/14/24  0649 05/13/24  0544   WBC AUTO x10*3/uL 9.7 7.0 7.2   HEMOGLOBIN g/dL 12.7 12.0 11.2*   HEMATOCRIT % 38.0 36.6 34.1*   PLATELETS AUTO x10*3/uL 227 228 187     Results from last 7 days   Lab Units 05/15/24  0556 05/14/24  0649 05/13/24  0544   SODIUM mmol/L 140 143 143   POTASSIUM mmol/L 3.1* 3.0* 3.1*   CHLORIDE mmol/L 104 105 106   CO2 mmol/L 26 29 29   BUN mg/dL 14 13 13   CREATININE mg/dL 0.92 1.00 1.04   CALCIUM mg/dL 7.9* 7.7* 7.6*   PROTEIN TOTAL g/dL 5.8* 5.4* 4.5*   BILIRUBIN TOTAL mg/dL 1.3* 1.1 1.1   ALK  PHOS U/L 75 71 68   ALT U/L 23 26 27   AST U/L 36 43* 48*   GLUCOSE mg/dL 110* 115* 81            XR chest 1 view   Final Result   Left-greater-than-right infiltrates and/or pleural effusions,   increased on the right from 3 days prior.        MACRO:   None.        Signed by: Rosanne Awan 5/14/2024 11:53 AM   Dictation workstation:   [id number]      XR chest 1 view   Final Result   Persistent airspace consolidation/infiltrate left lower lung zone   with stable left effusion.        Increasing infiltrate right mid lung zone and at right lung base with   persistent small right pleural effusion.        Worsening pulmonary vascular congestion/CHF.        Signed by: Catherine Freeman 5/11/2024 1:27 PM   Dictation workstation:   [id number]      XR chest 2 views   Final Result   Mild ongoing CHF as described.        MACRO:   None        Signed by: Terence Ya 5/10/2024 1:02 PM   Dictation workstation:   [id number]      FL modified barium swallow study   Final Result   Penetration with thin liquid and nectar consistencies. No other   penetration during this exam. No aspiration during this exam. Please   see above for details.        MACRO:   None        Signed by: Terence Ya 5/10/2024 2:31 PM   Dictation workstation:   [id number]      NM hepatobiliary   Final Result   Delayed filling of the gallbladder with no sign of cystic duct   obstruction/acute cholecystitis.        I personally reviewed the images/study and I agree with the findings   as stated by Brennon Benson MD (Radiology Resident).   This study was interpreted at [facility], [place], [place].        MACRO:   None        Signed by: Garett Ricks 5/8/2024 4:07 PM   Dictation workstation:   [id number]      US gallbladder   Final Result   Cholelithiasis and possible acute cholecystitis without biliary duct   dilatation. Correlate clinically and if indicated confirm with   hepatobiliary scan.              Signed by: Mushtaq Fitzgerald 5/7/2024 4:16 PM   Dictation workstation:   [id number]      CT abdomen pelvis wo IV contrast   Final Result   Small nonspecific bilateral pleural effusions with associated   posterior bibasilar lung atelectasis.        Punctate nonobstructing stone in the upper pole of the left kidney.        Scattered colonic diverticulosis without acute associated   inflammation.        Nonspecific mesenteric adenopathy with associated fat stranding   medial to the cecum and right colon. Question mesenteric adenitis.        Nonspecific gallbladder wall thickening with mild adjacent edema but   without calcified stone. Cannot exclude noncalcified gallstones.   Recommend further evaluation with gallbladder ultrasound to exclude   acute cholecystitis.        Mild nonspecific abdominal and pelvic ascites as described.        Previous hysterectomy.        Arthritic changes in the spine as described.        MACRO:   None        Signed by: Terence Ya 5/7/2024 2:57 PM   Dictation workstation:   [id number]            Physical Exam     Constitutional   General appearance: Awake    Pulmonary   Respiratory assessment: Coarse rhonchi bilaterally  Cardiovascular   Auscultation of heart: Apical pulse normal, heart rate and rhythm normal, normal S1 and S2, no murmurs and no pericardial rub.    Exam for edema: 1+ edema  Abdomen   Abdominal Exam: Distended abdomen  Liver and Spleen exam: No hepato-splenomegaly.   Musculoskeletal     Skin inspection: Normal skin color and pigmentation, normal skin turgor and no visible rash.   Neurologic   Cranial nerves: Nerves 2-12 were intact, no focal neuro defects.     Assessment/Plan      #Acute respiratory failure  #Acute CHF  DuoNebs as needed    #Cholecystitis acute versus chronic  Continue antibiotics    Son has requested DNR    #Dementia  At her baseline    #Hypertension  Stable    Ot and pt   Will ask pal care to see for goals of care    Cxr noted with pneumonia ,    Overall prognosis remains poor  Decrease amlodipine and add po lasix   Consult hospice in the facility  Noted input from ID augmentin 500 bid for 2 weeks  Dc plan for facility tomorrow

## 2024-05-15 NOTE — PROGRESS NOTES
Juanis Cornejo is a 89 y.o. female     Pain is little better today  She is more awake,   However still has pain expression on her face  She is unable to provide much hx  No chest pain  No shortness of breath    Review of Systems           Vitals:    05/15/24 0350   BP: 154/70   Pulse: 60   Resp: 18   Temp: 36.7 °C (98.1 °F)   SpO2: 90%        Scheduled medications  amLODIPine, 10 mg, oral, q AM  aspirin, 81 mg, oral, Daily  atorvastatin, 40 mg, oral, Nightly  busPIRone, 10 mg, oral, TID  docusate sodium, 100 mg, oral, BID  [Held by provider] enoxaparin, 40 mg, subcutaneous, q24h  furosemide, 20 mg, intravenous, Once  [Held by provider] furosemide, 40 mg, oral, Daily  iohexol, 85 mL, intravenous, Once in imaging  lisinopril, 20 mg, oral, Daily  lubricating eye drops, 2 drop, Both Eyes, BID  mirtazapine, 7.5 mg, oral, q PM  pantoprazole, 40 mg, oral, Daily before breakfast   Or  pantoprazole, 40 mg, intravenous, Daily before breakfast  piperacillin-tazobactam, 2.25 g, intravenous, q6h  risperiDONE, 0.25 mg, oral, BID  Tc-99m-albumin, 5.7 millicurie, intravenous, Once in imaging      Continuous medications  [Held by provider] D5 % and 0.9 % sodium chloride, 75 mL/hr, Last Rate: Stopped (05/11/24 1200)      PRN medications  PRN medications: acetaminophen, hydrALAZINE, ipratropium-albuteroL, melatonin, morphine, ondansetron, polyethylene glycol, risperiDONE    Lab Review   Results from last 7 days   Lab Units 05/14/24  0649 05/13/24  0544 05/12/24  0621   WBC AUTO x10*3/uL 7.0 7.2 6.6   HEMOGLOBIN g/dL 12.0 11.2* 11.0*   HEMATOCRIT % 36.6 34.1* 35.5*   PLATELETS AUTO x10*3/uL 228 187 169     Results from last 7 days   Lab Units 05/14/24  0649 05/13/24  0544 05/12/24  0621   SODIUM mmol/L 143 143 140   POTASSIUM mmol/L 3.0* 3.1* 3.2*   CHLORIDE mmol/L 105 106 108*   CO2 mmol/L 29 29 24   BUN mg/dL 13 13 11   CREATININE mg/dL 1.00 1.04 0.98   CALCIUM mg/dL 7.7* 7.6* 7.6*   PROTEIN TOTAL g/dL 5.4* 4.5* 5.3*   BILIRUBIN TOTAL  mg/dL 1.1 1.1 1.2   ALK PHOS U/L 71 68 67   ALT U/L 26 27 29   AST U/L 43* 48* 48*   GLUCOSE mg/dL 115* 81 86            XR chest 1 view   Final Result   Left-greater-than-right infiltrates and/or pleural effusions,   increased on the right from 3 days prior.        MACRO:   None.        Signed by: Rosanne Awan 5/14/2024 11:53 AM   Dictation workstation:   [id number]      XR chest 1 view   Final Result   Persistent airspace consolidation/infiltrate left lower lung zone   with stable left effusion.        Increasing infiltrate right mid lung zone and at right lung base with   persistent small right pleural effusion.        Worsening pulmonary vascular congestion/CHF.        Signed by: Catherine Freeman 5/11/2024 1:27 PM   Dictation workstation:   [id number]      XR chest 2 views   Final Result   Mild ongoing CHF as described.        MACRO:   None        Signed by: Terence Ya 5/10/2024 1:02 PM   Dictation workstation:   [id number]      FL modified barium swallow study   Final Result   Penetration with thin liquid and nectar consistencies. No other   penetration during this exam. No aspiration during this exam. Please   see above for details.        MACRO:   None        Signed by: Terence Ya 5/10/2024 2:31 PM   Dictation workstation:   [id number]      NM hepatobiliary   Final Result   Delayed filling of the gallbladder with no sign of cystic duct   obstruction/acute cholecystitis.        I personally reviewed the images/study and I agree with the findings   as stated by Brennon Benson MD (Radiology Resident).   This study was interpreted at [place], [place].        MACRO:   None        Signed by: Garett Ricks 5/8/2024 4:07 PM   Dictation workstation:   [id number]      US gallbladder   Final Result   Cholelithiasis and possible acute cholecystitis without biliary duct   dilatation. Correlate clinically and if indicated confirm with   hepatobiliary  scan.             Signed by: Mushtaq Fitzgerald 5/7/2024 4:16 PM   Dictation workstation:   [id number]      CT abdomen pelvis wo IV contrast   Final Result   Small nonspecific bilateral pleural effusions with associated   posterior bibasilar lung atelectasis.        Punctate nonobstructing stone in the upper pole of the left kidney.        Scattered colonic diverticulosis without acute associated   inflammation.        Nonspecific mesenteric adenopathy with associated fat stranding   medial to the cecum and right colon. Question mesenteric adenitis.        Nonspecific gallbladder wall thickening with mild adjacent edema but   without calcified stone. Cannot exclude noncalcified gallstones.   Recommend further evaluation with gallbladder ultrasound to exclude   acute cholecystitis.        Mild nonspecific abdominal and pelvic ascites as described.        Previous hysterectomy.        Arthritic changes in the spine as described.        MACRO:   None        Signed by: Terence Ya 5/7/2024 2:57 PM   Dictation workstation:   [id number]            Physical Exam     Constitutional   General appearance: Awake    Pulmonary   Respiratory assessment: Coarse rhonchi bilaterally  Cardiovascular   Auscultation of heart: Apical pulse normal, heart rate and rhythm normal, normal S1 and S2, no murmurs and no pericardial rub.    Exam for edema: 1+ edema  Abdomen   Abdominal Exam: Distended abdomen  Liver and Spleen exam: No hepato-splenomegaly.   Musculoskeletal     Skin inspection: Normal skin color and pigmentation, normal skin turgor and no visible rash.   Neurologic   Cranial nerves: Nerves 2-12 were intact, no focal neuro defects.     Assessment/Plan      #Acute respiratory failure  #Acute CHF  DuoNebs as needed    #Cholecystitis acute versus chronic  Continue antibiotics    Son has requested DNR    #Dementia  At her baseline    #Hypertension  Stable    Ot and pt   Will ask pal care to see for goals of care    Cxr noted with  pneumonia , holding lasix BP is low    Overall prognosis remains poor  Dw son   Noted input from yeny Blanca plan on dc in next day or so   Consult hospice in the facility

## 2024-05-15 NOTE — CARE PLAN
The patient's goals for the shift include      The clinical goals for the shift include Pt will remain HDS    Problem: Pain  Goal: My pain/discomfort is manageable  Outcome: Progressing     Problem: Safety  Goal: Patient will be injury free during hospitalization  Outcome: Progressing  Goal: I will remain free of falls  Outcome: Progressing     Problem: Daily Care  Goal: Daily care needs are met  Outcome: Progressing     Problem: Psychosocial Needs  Goal: Demonstrates ability to cope with hospitalization/illness  Outcome: Progressing  Goal: Collaborate with me, my family, and caregiver to identify my specific goals  Outcome: Progressing     Problem: Discharge Barriers  Goal: My discharge needs are met  Outcome: Progressing     Problem: Skin  Goal: Decreased wound size/increased tissue granulation at next dressing change  Outcome: Progressing  Goal: Participates in plan/prevention/treatment measures  Outcome: Progressing  Goal: Prevent/manage excess moisture  Outcome: Progressing  Goal: Prevent/minimize sheer/friction injuries  Outcome: Progressing  Goal: Promote/optimize nutrition  Outcome: Progressing  Goal: Promote skin healing  Outcome: Progressing     Problem: Pain - Adult  Goal: Verbalizes/displays adequate comfort level or baseline comfort level  Outcome: Progressing     Problem: Safety - Adult  Goal: Free from fall injury  Outcome: Progressing     Problem: Discharge Planning  Goal: Discharge to home or other facility with appropriate resources  Outcome: Progressing     Problem: Chronic Conditions and Co-morbidities  Goal: Patient's chronic conditions and co-morbidity symptoms are monitored and maintained or improved  Outcome: Progressing     Problem: Fall/Injury  Goal: Not fall by end of shift  Outcome: Progressing  Goal: Be free from injury by end of the shift  Outcome: Progressing  Goal: Verbalize understanding of personal risk factors for fall in the hospital  Outcome: Progressing  Goal: Verbalize  understanding of risk factor reduction measures to prevent injury from fall in the home  Outcome: Progressing  Goal: Use assistive devices by end of the shift  Outcome: Progressing  Goal: Pace activities to prevent fatigue by end of the shift  Outcome: Progressing     Problem: Pain  Goal: Takes deep breaths with improved pain control throughout the shift  Outcome: Progressing  Goal: Turns in bed with improved pain control throughout the shift  Outcome: Progressing  Goal: Walks with improved pain control throughout the shift  Outcome: Progressing  Goal: Performs ADL's with improved pain control throughout shift  Outcome: Progressing  Goal: Participates in PT with improved pain control throughout the shift  Outcome: Progressing  Goal: Free from opioid side effects throughout the shift  Outcome: Progressing  Goal: Free from acute confusion related to pain meds throughout the shift  Outcome: Progressing

## 2024-05-16 VITALS
BODY MASS INDEX: 25.08 KG/M2 | HEART RATE: 73 BPM | RESPIRATION RATE: 18 BRPM | WEIGHT: 119.49 LBS | SYSTOLIC BLOOD PRESSURE: 122 MMHG | TEMPERATURE: 97.7 F | DIASTOLIC BLOOD PRESSURE: 66 MMHG | HEIGHT: 58 IN | OXYGEN SATURATION: 97 %

## 2024-05-16 LAB
ALBUMIN SERPL BCP-MCNC: 3 G/DL (ref 3.4–5)
ALP SERPL-CCNC: 71 U/L (ref 33–136)
ALT SERPL W P-5'-P-CCNC: 21 U/L (ref 7–45)
ANION GAP SERPL CALC-SCNC: 12 MMOL/L (ref 10–20)
AST SERPL W P-5'-P-CCNC: 30 U/L (ref 9–39)
BILIRUB SERPL-MCNC: 1 MG/DL (ref 0–1.2)
BUN SERPL-MCNC: 14 MG/DL (ref 6–23)
CALCIUM SERPL-MCNC: 7.7 MG/DL (ref 8.6–10.3)
CHLORIDE SERPL-SCNC: 105 MMOL/L (ref 98–107)
CO2 SERPL-SCNC: 29 MMOL/L (ref 21–32)
CREAT SERPL-MCNC: 0.93 MG/DL (ref 0.5–1.05)
EGFRCR SERPLBLD CKD-EPI 2021: 57 ML/MIN/1.73M*2
ERYTHROCYTE [DISTWIDTH] IN BLOOD BY AUTOMATED COUNT: 14.4 % (ref 11.5–14.5)
GLUCOSE SERPL-MCNC: 100 MG/DL (ref 74–99)
HCT VFR BLD AUTO: 36.3 % (ref 36–46)
HGB BLD-MCNC: 12.1 G/DL (ref 12–16)
MAGNESIUM SERPL-MCNC: 1.9 MG/DL (ref 1.6–2.4)
MCH RBC QN AUTO: 33.6 PG (ref 26–34)
MCHC RBC AUTO-ENTMCNC: 33.3 G/DL (ref 32–36)
MCV RBC AUTO: 101 FL (ref 80–100)
NRBC BLD-RTO: 0 /100 WBCS (ref 0–0)
PLATELET # BLD AUTO: 228 X10*3/UL (ref 150–450)
POTASSIUM SERPL-SCNC: 3 MMOL/L (ref 3.5–5.3)
PROT SERPL-MCNC: 5.3 G/DL (ref 6.4–8.2)
RBC # BLD AUTO: 3.6 X10*6/UL (ref 4–5.2)
SODIUM SERPL-SCNC: 143 MMOL/L (ref 136–145)
WBC # BLD AUTO: 9.4 X10*3/UL (ref 4.4–11.3)

## 2024-05-16 PROCEDURE — 36415 COLL VENOUS BLD VENIPUNCTURE: CPT | Performed by: NURSE PRACTITIONER

## 2024-05-16 PROCEDURE — C9113 INJ PANTOPRAZOLE SODIUM, VIA: HCPCS | Performed by: NURSE PRACTITIONER

## 2024-05-16 PROCEDURE — 2500000001 HC RX 250 WO HCPCS SELF ADMINISTERED DRUGS (ALT 637 FOR MEDICARE OP): Performed by: FAMILY MEDICINE

## 2024-05-16 PROCEDURE — 84075 ASSAY ALKALINE PHOSPHATASE: CPT | Performed by: NURSE PRACTITIONER

## 2024-05-16 PROCEDURE — 2500000001 HC RX 250 WO HCPCS SELF ADMINISTERED DRUGS (ALT 637 FOR MEDICARE OP): Performed by: NURSE PRACTITIONER

## 2024-05-16 PROCEDURE — 2500000004 HC RX 250 GENERAL PHARMACY W/ HCPCS (ALT 636 FOR OP/ED): Performed by: NURSE PRACTITIONER

## 2024-05-16 PROCEDURE — 85027 COMPLETE CBC AUTOMATED: CPT | Performed by: NURSE PRACTITIONER

## 2024-05-16 PROCEDURE — 83735 ASSAY OF MAGNESIUM: CPT | Performed by: NURSE PRACTITIONER

## 2024-05-16 PROCEDURE — 2500000005 HC RX 250 GENERAL PHARMACY W/O HCPCS: Performed by: NURSE PRACTITIONER

## 2024-05-16 RX ORDER — AMLODIPINE BESYLATE 5 MG/1
5 TABLET ORAL EVERY MORNING
Start: 2024-05-16

## 2024-05-16 RX ORDER — MIRTAZAPINE 7.5 MG/1
7.5 TABLET, FILM COATED ORAL EVERY EVENING
Start: 2024-05-16

## 2024-05-16 RX ORDER — FUROSEMIDE 20 MG/1
20 TABLET ORAL DAILY
Start: 2024-05-16

## 2024-05-16 RX ORDER — RISPERIDONE 0.25 MG/1
0.25 TABLET ORAL 2 TIMES DAILY
Start: 2024-05-16

## 2024-05-16 RX ORDER — POTASSIUM CHLORIDE 1.5 G/1.58G
40 POWDER, FOR SOLUTION ORAL ONCE
Status: COMPLETED | OUTPATIENT
Start: 2024-05-16 | End: 2024-05-16

## 2024-05-16 RX ORDER — AMOXICILLIN AND CLAVULANATE POTASSIUM 500; 125 MG/1; MG/1
1 TABLET, FILM COATED ORAL 2 TIMES DAILY
Start: 2024-05-16 | End: 2024-05-30

## 2024-05-16 RX ADMIN — ASPIRIN 81 MG: 81 TABLET, COATED ORAL at 10:22

## 2024-05-16 RX ADMIN — PANTOPRAZOLE SODIUM 40 MG: 40 INJECTION, POWDER, FOR SOLUTION INTRAVENOUS at 06:01

## 2024-05-16 RX ADMIN — POTASSIUM CHLORIDE 40 MEQ: 1.5 POWDER, FOR SOLUTION ORAL at 10:22

## 2024-05-16 RX ADMIN — FUROSEMIDE 20 MG: 20 TABLET ORAL at 10:22

## 2024-05-16 RX ADMIN — LISINOPRIL 20 MG: 20 TABLET ORAL at 10:23

## 2024-05-16 RX ADMIN — CARBOXYMETHYLCELLULOSE SODIUM 2 DROP: 0.5 SOLUTION/ DROPS OPHTHALMIC at 10:21

## 2024-05-16 RX ADMIN — AMLODIPINE BESYLATE 5 MG: 5 TABLET ORAL at 10:21

## 2024-05-16 RX ADMIN — RISPERIDONE 0.25 MG: 0.25 TABLET, FILM COATED ORAL at 10:23

## 2024-05-16 RX ADMIN — BUSPIRONE HYDROCHLORIDE 10 MG: 10 TABLET ORAL at 10:21

## 2024-05-16 RX ADMIN — PIPERACILLIN SODIUM AND TAZOBACTAM SODIUM 2.25 G: 2; .25 INJECTION, SOLUTION INTRAVENOUS at 06:00

## 2024-05-16 RX ADMIN — DOCUSATE SODIUM 100 MG: 100 CAPSULE, LIQUID FILLED ORAL at 10:22

## 2024-05-16 NOTE — NURSING NOTE
Rapid Response Nurse Note:     Due to bedside nurse difficulty obtaining access, an ultrasound-guided IV was placed.  Access was obtained after 1 attempt an 20 gauge was placed in the left Forearm.  Confirmed via ultrasound, blood return noted.  Saline lock placed.  Tegaderm used to secure IV. Patient tolerated the procedure well. Education provided to bedside nurse and patient. No questions/concerns at this time.

## 2024-05-16 NOTE — PROGRESS NOTES
Juanis Cornejo is a 89 y.o. female     Pain is little better today  She is more awake,   She is unable to provide much hx  No chest pain  No shortness of breath    Review of Systems           Vitals:    05/16/24 0746   BP: 122/66   Pulse:    Resp:    Temp: 36.5 °C (97.7 °F)   SpO2: 97%        Scheduled medications  amLODIPine, 5 mg, oral, q AM  aspirin, 81 mg, oral, Daily  atorvastatin, 40 mg, oral, Nightly  busPIRone, 10 mg, oral, TID  docusate sodium, 100 mg, oral, BID  [Held by provider] enoxaparin, 40 mg, subcutaneous, q24h  furosemide, 20 mg, oral, Daily  iohexol, 85 mL, intravenous, Once in imaging  lisinopril, 20 mg, oral, Daily  lubricating eye drops, 2 drop, Both Eyes, BID  mirtazapine, 7.5 mg, oral, q PM  pantoprazole, 40 mg, oral, Daily before breakfast   Or  pantoprazole, 40 mg, intravenous, Daily before breakfast  piperacillin-tazobactam, 2.25 g, intravenous, q6h  potassium chloride, 40 mEq, oral, Once  risperiDONE, 0.25 mg, oral, BID  Tc-99m-albumin, 5.7 millicurie, intravenous, Once in imaging      Continuous medications  [Held by provider] D5 % and 0.9 % sodium chloride, 75 mL/hr, Last Rate: Stopped (05/11/24 1200)      PRN medications  PRN medications: acetaminophen, hydrALAZINE, ipratropium-albuteroL, melatonin, morphine, ondansetron, polyethylene glycol, risperiDONE    Lab Review   Results from last 7 days   Lab Units 05/16/24  0637 05/15/24  0556 05/14/24  0649   WBC AUTO x10*3/uL 9.4 9.7 7.0   HEMOGLOBIN g/dL 12.1 12.7 12.0   HEMATOCRIT % 36.3 38.0 36.6   PLATELETS AUTO x10*3/uL 228 227 228     Results from last 7 days   Lab Units 05/16/24  0638 05/15/24  0556 05/14/24  0649   SODIUM mmol/L 143 140 143   POTASSIUM mmol/L 3.0* 3.1* 3.0*   CHLORIDE mmol/L 105 104 105   CO2 mmol/L 29 26 29   BUN mg/dL 14 14 13   CREATININE mg/dL 0.93 0.92 1.00   CALCIUM mg/dL 7.7* 7.9* 7.7*   PROTEIN TOTAL g/dL 5.3* 5.8* 5.4*   BILIRUBIN TOTAL mg/dL 1.0 1.3* 1.1   ALK PHOS U/L 71 75 71   ALT U/L 21 23 26   AST U/L 30  36 43*   GLUCOSE mg/dL 100* 110* 115*            XR chest 1 view   Final Result   Left-greater-than-right infiltrates and/or pleural effusions,   increased on the right from 3 days prior.        MACRO:   None.        Signed by: Rosanne Awan 5/14/2024 11:53 AM   Dictation workstation:   [id number]      XR chest 1 view   Final Result   Persistent airspace consolidation/infiltrate left lower lung zone   with stable left effusion.        Increasing infiltrate right mid lung zone and at right lung base with   persistent small right pleural effusion.        Worsening pulmonary vascular congestion/CHF.        Signed by: Catherine Freeman 5/11/2024 1:27 PM   Dictation workstation:   [id number]      XR chest 2 views   Final Result   Mild ongoing CHF as described.        MACRO:   None        Signed by: Terence Ya 5/10/2024 1:02 PM   Dictation workstation:   [id number]      FL modified barium swallow study   Final Result   Penetration with thin liquid and nectar consistencies. No other   penetration during this exam. No aspiration during this exam. Please   see above for details.        MACRO:   None        Signed by: Terence Ya 5/10/2024 2:31 PM   Dictation workstation:   [id number]      NM hepatobiliary   Final Result   Delayed filling of the gallbladder with no sign of cystic duct   obstruction/acute cholecystitis.        I personally reviewed the images/study and I agree with the findings   as stated by Brennon Benson MD (Radiology Resident).   This study was interpreted at [place], [place].        MACRO:   None        Signed by: Garett Ricks 5/8/2024 4:07 PM   Dictation workstation:   [id number]      US gallbladder   Final Result   Cholelithiasis and possible acute cholecystitis without biliary duct   dilatation. Correlate clinically and if indicated confirm with   hepatobiliary scan.             Signed by: Mushtaq Fitzgerald 5/7/2024 4:16 PM   Dictation  workstation:   [id number]      CT abdomen pelvis wo IV contrast   Final Result   Small nonspecific bilateral pleural effusions with associated   posterior bibasilar lung atelectasis.        Punctate nonobstructing stone in the upper pole of the left kidney.        Scattered colonic diverticulosis without acute associated   inflammation.        Nonspecific mesenteric adenopathy with associated fat stranding   medial to the cecum and right colon. Question mesenteric adenitis.        Nonspecific gallbladder wall thickening with mild adjacent edema but   without calcified stone. Cannot exclude noncalcified gallstones.   Recommend further evaluation with gallbladder ultrasound to exclude   acute cholecystitis.        Mild nonspecific abdominal and pelvic ascites as described.        Previous hysterectomy.        Arthritic changes in the spine as described.        MACRO:   None        Signed by: Terence Ya 5/7/2024 2:57 PM   Dictation workstation:   [id number]            Physical Exam     Constitutional   General appearance: Awake    Pulmonary   Respiratory assessment: Coarse rhonchi bilaterally  Cardiovascular   Auscultation of heart: Apical pulse normal, heart rate and rhythm normal, normal S1 and S2, no murmurs and no pericardial rub.    Exam for edema: no edema  Abdomen   Abdominal Exam: Distended abdomen  Liver and Spleen exam: No hepato-splenomegaly.   Musculoskeletal     Skin inspection: Normal skin color and pigmentation, normal skin turgor and no visible rash.   Neurologic   Cranial nerves: Nerves 2-12 were intact, no focal neuro defects.     Assessment/Plan      #Acute respiratory failure  #Acute CHF  DuoNebs as needed    #Cholecystitis acute versus chronic  Continue antibiotics    Son has requested DNR    #Dementia  At her baseline    #Hypertension  Stable    hypokalemia    Ot and pt   Will ask pal care to see for goals of care    Cxr noted with pneumonia ,   Overall prognosis remains poor  Decrease  amlodipine and add po lasix and BP stable with this  Consult hospice in the facility  Noted input from ID augmentin 500 bid for 2 weeks  Dc plan for facility today  Potassium supplemetned    Time > 30 min in discharge planning  Newton Agarwal MD

## 2024-05-16 NOTE — PROGRESS NOTES
05/16/24 0948   Discharge Planning   Patient expects to be discharged to: [facility] aware, regarding transport being set up for 10am, no discharge orders as of now, aware that patient's son has a hospice meeting at 2pm today.

## 2024-05-16 NOTE — DISCHARGE SUMMARY
Discharge Diagnosis  RUQ abdominal pain    Issues Requiring Follow-Up  Consider hospice    Discharge Meds     Your medication list        START taking these medications        Instructions Last Dose Given Next Dose Due   amoxicillin-pot clavulanate 500-125 mg tablet  Commonly known as: Augmentin      Take 1 tablet by mouth 2 times a day for 14 days.              CHANGE how you take these medications        Instructions Last Dose Given Next Dose Due   amLODIPine 5 mg tablet  Commonly known as: Norvasc  What changed:   medication strength  how much to take      Take 1 tablet (5 mg) by mouth once daily in the morning.       furosemide 20 mg tablet  Commonly known as: Lasix  What changed:   medication strength  how much to take      Take 1 tablet (20 mg) by mouth once daily.       mirtazapine 7.5 mg tablet  Commonly known as: Remeron  What changed:   medication strength  how much to take      Take 1 tablet (7.5 mg) by mouth once daily in the evening.       risperiDONE 0.25 mg tablet  Commonly known as: RisperDAL  What changed:   medication strength  additional instructions  Another medication with the same name was removed. Continue taking this medication, and follow the directions you see here.      Take 1 tablet (0.25 mg) by mouth 2 times a day.              CONTINUE taking these medications        Instructions Last Dose Given Next Dose Due   acetaminophen 500 mg tablet  Commonly known as: Tylenol           acetaminophen 325 mg tablet  Commonly known as: Tylenol           aspirin 81 mg EC tablet      Take 1 tablet (81 mg) by mouth once daily.       atorvastatin 40 mg tablet  Commonly known as: Lipitor      Take 1 tablet (40 mg) by mouth once daily at bedtime.       busPIRone 10 mg tablet  Commonly known as: Buspar           docusate sodium 100 mg tablet  Commonly known as: Colace           ketoconazole 2 % shampoo  Commonly known as: NIZOral           lisinopril 20 mg tablet           loperamide 2 mg capsule  Commonly  known as: Imodium           magnesium hydroxide 400 mg/5 mL suspension  Commonly known as: Milk of Magnesia           nystatin 100,000 unit/gram powder  Commonly known as: Mycostatin           ondansetron ODT 4 mg disintegrating tablet  Commonly known as: Zofran-ODT           potassium chloride CR 20 mEq ER tablet  Commonly known as: Klor-Con M20           Refresh Classic (PF) 1.4-0.6 % ophthalmic solution  Generic drug: lubricating eye drops                  STOP taking these medications      Vitamin D3 50 MCG (2000 UT) tablet  Generic drug: cholecalciferol                  Where to Get Your Medications        Information about where to get these medications is not yet available    Ask your nurse or doctor about these medications  amLODIPine 5 mg tablet  amoxicillin-pot clavulanate 500-125 mg tablet  furosemide 20 mg tablet  mirtazapine 7.5 mg tablet  risperiDONE 0.25 mg tablet         Test Results Pending At Discharge  Pending Labs       Order Current Status    Extra Urine Gray Tube Collected (05/08/24 0234)    Urinalysis with Reflex Culture and Microscopic In process            Hospital Course   Pt admitted to hospital for cholecystitis and seen by surgery and interventional radiology and did not feel that pt will benefit from surgery or drain placement,   She has been started on antibiotics and will be discharged on antibiotics  Chf pt was diuresed   Pneumonia treated  Dementia and poor po intake seen pal care and recommended hospice, family is going to meet with hospice in the facility   Potassium supplemented   Pt will be discharged to her facility     Pertinent Physical Exam At Time of Discharge  Physical Exam    Outpatient Follow-Up  No future appointments.      Newton Agarwal MD

## 2024-05-16 NOTE — NURSING NOTE

## 2024-05-18 NOTE — DOCUMENTATION CLARIFICATION NOTE
"    PATIENT:               ANTONY VALERO  ACCT #:                  [id number]  MRN:                       [id number]  :                       6/15/1928  ADMIT DATE:       2024 11:06 AM  DISCH DATE:        2024 10:54 AM  RESPONDING PROVIDER #:        [id number]          PROVIDER RESPONSE TEXT:    UTI is ruled out after study    CDI QUERY TEXT:    Clarification        Instruction:    Based on your assessment of the patient and the clinical information, please provide the requested documentation by clicking on the appropriate radio button and enter any additional information if prompted.    Question: Please further clarify the diagnosis of UTI    When answering this query, please exercise your independent professional judgment. The fact that a question is being asked, does not imply that any particular answer is desired or expected.    The patient's clinical indicators include:  Clinical Information: 89 year old woman with abdominal pain. Pt's diagnoses include cholelithiasis. Pt's PMH includes Alzheimer dementia, CKD, and HTN.    Clinical Indicators:  Progress Notes : CNP Nayla  \"UTI-continue IV zosyn, follow urine cultures\"    Urine culture(): multiple organisms-probable contaminant  Blood culture(): no growth 2 days    Treatment: Zosyn 2.225gm iv every 6 hours: -ongoing    Risk Factors: age  Options provided:  -- UTI is ruled out after study  -- UTI is confirmed diagnosis for this admission  -- Other - I will add my own diagnosis  -- Refer to Clinical Documentation Reviewer    Query created by: Lizbet Gay on 5/10/2024 4:11 PM      Electronically signed by:  REGAN PULIDO MD 2024 7:18 AM          "

## 2024-05-18 NOTE — DOCUMENTATION CLARIFICATION NOTE
"    PATIENT:               ANTONY VALERO  ACCT #:                  [id number]  MRN:                       [id number]  :                       6/15/1928  ADMIT DATE:       2024 11:06 AM  DISCH DATE:        2024 10:54 AM  RESPONDING PROVIDER #:        [id number]          PROVIDER RESPONSE TEXT:    Acute Diastolic Congestive Heart Failure    CDI QUERY TEXT:    Clarification      Instruction:    Based on your assessment of the patient and the clinical information, please provide the requested documentation by clicking on the appropriate radio button and enter any additional information if prompted.    Question: Please further clarify the CHF diagnosis    When answering this query, please exercise your independent professional judgment. The fact that a question is being asked, does not imply that any particular answer is desired or expected.    The patient's clinical indicators include:  Clinical Information: 89 year old woman with abdominal pain. Pt's diagnoses include cholelithiasis. Pt's PMH includes Alzheimer dementia, CKD, and HTN.    Clinical Indicators:  Progress Notes -: Dr. Drake  \"Patient appears quite wheezy and crackles on auscultation...Still appears overloaded...Will give another dose of Lasix...Acute CHF\"    Progress Notes : Dr. Agarwal  \"Acute CHF\"    -Echo(24): \"The left ventricular systolic function is hyperdynamic, with an estimated ejection fraction of 70-75%. There are no regional wall motion abnormalities. The left ventricular cavity size is decreased. Left ventricular diastolic filling was indeterminate...\"    -BNP(5/10):  413     BNP(): 409    -CXR(5/10): \"Mild central vascular congestion. Mild interstitial thickening at the bases. Bilateral pleural effusions, left larger than right. Bibasilar atelectasis versus edema, left greater than right.\"    -CXR(): \"Increasing infiltrate right mid lung zone and at right lung base with persistent small right pleural " "effusion...Worsening pulmonary vascular congestion/CHF\"    Treatment:  -Lasix 40mg iv: 5/11 and 5/12  -O2 2-3L    Risk Factors: age  Options provided:  -- Acute Diastolic Congestive Heart Failure  -- Acute on Chronic Diastolic Congestive Heart Failure  -- Other - I will add my own diagnosis  -- Refer to Clinical Documentation Reviewer    Query created by: Lizbet Gay on 5/14/2024 3:00 PM      Electronically signed by:  REGAN PULIDO MD 5/18/2024 7:18 AM          "